# Patient Record
Sex: FEMALE | Race: ASIAN | NOT HISPANIC OR LATINO | Employment: OTHER | URBAN - METROPOLITAN AREA
[De-identification: names, ages, dates, MRNs, and addresses within clinical notes are randomized per-mention and may not be internally consistent; named-entity substitution may affect disease eponyms.]

---

## 2017-01-16 ENCOUNTER — TELEPHONE (OUTPATIENT)
Dept: MEDICAL GROUP | Age: 72
End: 2017-01-16

## 2017-01-16 NOTE — TELEPHONE ENCOUNTER
ANNUAL WELLNESS VISIT PRE-VISIT PLANNING     1.  Reviewed last PCP office visit assessment and plan notes: Yes    2.  If any orders were placed last visit do we have Results/Consult Notes?        •  Labs? Yes order for 6/28/17       •  Imaging? Yes complete       •  Referrals? Yes complete    3.  Patient Care Coordination Note was updated with diagnosis information:  Yes    4.  Patient is due for these Health Maintenance Topics:   Health Maintenance Due   Topic Date Due   • PAP SMEAR  01/17/1967   • IMM ZOSTER VACCINE  01/17/2006              5.  Immunizations were updated in [facility] using WebIZ?: Yes       •  Web Iz Recommendations:  Hep A, Hep B, Shingles       •  Is patient due for Tdap/Shingles? Yes.  If yes, was patient alerted of copay? Yes    6.  Patient has:       •   Diabetes: no       •   COPD: no       •   CHF: no       •   Depression: no    7.  Updated Care Team with Doutor Recomenda and all specialists?        •   Gait devices, O2, CPAP, etc: no        •   Eye professional: yes       •   Other specialists (GYN, cardiology, endo, etc): yes    8.  Is patient in need of any refills prior to office visit? No       •    Separate refill encounter created?: N\A    9.  Patient was informed to arrive 15 min prior to their scheduled appointment and bring in their medication bottles? yes    10.  Patient was advised: “This is a free wellness visit. The provider will screen for medical conditions to help you stay healthy. If you have other concerns to address you may be asked to discuss these at a separate visit or there may be an additional fee.”  Yes

## 2017-01-23 ENCOUNTER — OFFICE VISIT (OUTPATIENT)
Dept: MEDICAL GROUP | Age: 72
End: 2017-01-23
Payer: MEDICARE

## 2017-01-23 VITALS
DIASTOLIC BLOOD PRESSURE: 78 MMHG | WEIGHT: 129 LBS | HEIGHT: 65 IN | OXYGEN SATURATION: 95 % | SYSTOLIC BLOOD PRESSURE: 106 MMHG | HEART RATE: 66 BPM | TEMPERATURE: 97.2 F | BODY MASS INDEX: 21.49 KG/M2

## 2017-01-23 DIAGNOSIS — R73.01 IMPAIRED FASTING GLUCOSE: ICD-10-CM

## 2017-01-23 DIAGNOSIS — Z00.00 MEDICARE ANNUAL WELLNESS VISIT, SUBSEQUENT: ICD-10-CM

## 2017-01-23 DIAGNOSIS — M81.0 OSTEOPOROSIS: ICD-10-CM

## 2017-01-23 DIAGNOSIS — E55.9 VITAMIN D DEFICIENCY DISEASE: ICD-10-CM

## 2017-01-23 DIAGNOSIS — E78.2 MIXED HYPERLIPIDEMIA: ICD-10-CM

## 2017-01-23 DIAGNOSIS — K21.9 GASTROESOPHAGEAL REFLUX DISEASE, ESOPHAGITIS PRESENCE NOT SPECIFIED: ICD-10-CM

## 2017-01-23 DIAGNOSIS — Z12.31 SCREENING MAMMOGRAM, ENCOUNTER FOR: ICD-10-CM

## 2017-01-23 PROCEDURE — G0439 PPPS, SUBSEQ VISIT: HCPCS | Performed by: INTERNAL MEDICINE

## 2017-01-23 ASSESSMENT — PATIENT HEALTH QUESTIONNAIRE - PHQ9: CLINICAL INTERPRETATION OF PHQ2 SCORE: 0

## 2017-01-23 NOTE — PROGRESS NOTES
Chief Complaint   Patient presents with   • Annual Wellness Visit         HPI:  Rachele is a 72 y.o. female here for Medicare Annual Wellness Visit         Patient Active Problem List    Diagnosis Date Noted   • Osteoporosis- dexa 2016; fosamax started 12/28/2016   • GERD (gastroesophageal reflux disease) 11/05/2014   • Vitamin D deficiency disease 10/21/2013   • Impaired fasting glucose 09/28/2012   • HLD (hyperlipidemia) 06/14/2012       Current Outpatient Prescriptions   Medication Sig Dispense Refill   • alendronate (FOSAMAX) 70 MG Tab Take 1 Tab by mouth every 7 days. 12 Tab 4   • fenofibrate micronized (LOFIBRA) 134 MG capsule Take 1 Cap by mouth every morning before breakfast. 90 Cap 4   • Cholecalciferol (VITAMIN D3) 5000 UNITS Cap Take 1 Cap by mouth every day. 90 Cap 4   • ranitidine (ZANTAC) 150 MG Tab Take 1 Tab by mouth 2 times a day. 60 Tab 11   • naphazoline-pheniramine (OPCON-A) 0.027-0.315 % SOLN Place 1 Drop in both eyes 3 times a day as needed.       • fluticasone (FLONASE) 50 MCG/ACT nasal spray Spray 1 Spray in nose every day. (Patient not taking: Reported on 1/23/2017) 1 Bottle 0     No current facility-administered medications for this visit.        The patient reports adherence to this regimen    Current supplements as per medication list.   Chronic narcotic pain medicines: no    Allergies: Review of patient's allergies indicates no known allergies.    Current social contact/activities: works [employer] hard to do any social activities      Is patient current with immunizations?  no     If no, due for Shingles      She  reports that she has never smoked. She has never used smokeless tobacco. She reports that she does not drink alcohol or use illicit drugs.  Counseling given: Yes        DPA/Advanced Directive:  Patient does not have an advanced directive.  If not on file, instructed to bring in a copy to scan into her chart. If no advanced directive exists, a packet and workshop information was  provided    ROS:    Gait: Uses no assistive device    Ostomy: no    Other tubes: no     Amputations: no    Chronic oxygen use no    Last eye exam 6/2015    : Denies incontinence.      Depression Screening    Little interest or pleasure in doing things?  0 - not at all  Feeling down, depressed, or hopeless?  0 - not at all  Patient Health Questionnaire Score: 0    If depressive symptoms identified deferred to follow up visit unless specifically addressed in assessment and plan.    Screening for Cognitive Impairment    Three Minute Recall (banana, sunrise, fence)  3/3 Apple, river, kaiser  Draw clock face with all 12 numbers set to the hand to show 10 minutes past 11 o'clock  1 5/5  Cognitive concerns identified deferred for follow up unless specifically addressed in assessment and plan.    Fall Risk Assessment    Has the patient had two or more falls in the last year or any fall with injury in the last year?  No    Safety Assessment    Throw rugs on floor.  Yes  Handrails on all stairs.  Yes  Good lighting in all hallways.  Yes  Difficulty hearing.  Yes  Patient counseled about all safety risks that were identified.    Functional Assessment ADLs    Are there any barriers preventing you from cooking for yourself or meeting nutritional needs?  No.    Are there any barriers preventing you from driving safely or obtaining transportation?  No.    Are there any barriers preventing you from using a telephone or calling for help?  No.    Are there any barriers preventing you from shopping?  No.    Are there any barriers preventing you from taking care of your own finances?  No.    Are there any barriers preventing you from managing your medications?  No.    Are currently engaging any exercise or physical activity?  Yes.  Exercise 4 days a week.    Health Maintenance Summary                PAP SMEAR Overdue 1/17/1967     IMM ZOSTER VACCINE Overdue 1/17/2006     Annual Wellness Visit Overdue 1/22/2017      Done 1/22/2016  "Visit Dx: Medicare annual wellness visit, subsequent     Patient has more history with this topic...    MAMMOGRAM Next Due 2/8/2017      Done 2/8/2016 MA-SCREEN MAMMO W/CAD-BILAT     Patient has more history with this topic...    BONE DENSITY Next Due 2/8/2021      Done 2/8/2016 DS-BONE DENSITY STUDY (DEXA)    IMM DTaP/Tdap/Td Vaccine Next Due 6/20/2023      Done 6/20/2013 Imm Admin: Tdap Vaccine    COLONOSCOPY Next Due 3/10/2026      Done 3/10/2016 AMB REFERRAL TO GI FOR COLONOSCOPY     Patient has more history with this topic...          Patient Care Team:  Doug Carpenter M.D. as PCP - General (Internal Medicine)  Doug Hu M.D. as Consulting Physician (Ophthalmology)  [facility] as Consulting Physician (Gastroenterology)    Social History   Substance Use Topics   • Smoking status: Never Smoker    • Smokeless tobacco: Never Used   • Alcohol Use: No     Family History   Problem Relation Age of Onset   • Other Father      kidney failure   • Genetic Paternal Grandfather      liver problems     She  has a past medical history of Allergy.   Past Surgical History   Procedure Laterality Date   • Tonsillectomy  1952           Exam:     Blood pressure 106/78, pulse 66, temperature 36.2 °C (97.2 °F), height 1.648 m (5' 4.9\"), weight 58.514 kg (129 lb), SpO2 95 %. Body mass index is 21.54 kg/(m^2).    Hearing excellent.    Dentition good  Alert, oriented in no acute distress.  Eye contact is good, speech goal directed, affect calm       Assessment and Plan. The following treatment and monitoring plan is recommended:          1. Osteoporosis- dexa 2016; fosamax started  Under good control. Continue same regimen.      - Annual Wellness Visit - Includes PPPS Subsequent ()    2. Gastroesophageal reflux disease, esophagitis presence not specifiedUnder good control. Continue same regimen.      - Annual Wellness Visit - Includes PPPS Subsequent ()    3. Vitamin D deficiency diseaseUnder good " control. Continue same regimen.      - Annual Wellness Visit - Includes PPPS Subsequent ()    4. Impaired fasting glucoseUnder good control. Continue same regimen.     - Annual Wellness Visit - Includes PPPS Subsequent ()    5. Mixed hyperlipidemia   Under good control. Continue same regimen.  - Annual Wellness Visit - Includes PPPS Subsequent ()  - COMP METABOLIC PANEL; Future  - CBC WITH DIFFERENTIAL; Future  - LIPID PROFILE; Future     Services needed: No services needed at this time  Health Care Screening recommendations as per orders if indicated.  Referrals offered: PT/OT/Nutrition counseling/Behavioral Health/Smoking cessation as per orders if indicated.    Discussion today about general wellness and lifestyle habits:    · Prevent falls and reduce trip hazards; Cautioned about securing or removing rugs.  · Have a working fire alarm and carbon monoxide detector;   · Engage in regular physical activity and social activities      Follow-up: No Follow-up on file.

## 2017-01-23 NOTE — MR AVS SNAPSHOT
"Rachele Madrigal   2017 11:00 AM   Office Visit   MRN: [id number]    Department:  [address] [place] [address]   Dept Phone:  [phone]    Description:  Female : 1945   Provider:  Doug Carpenter M.D.; [facility]            Reason for Visit     Annual Wellness Visit           Allergies as of 2017     No Known Allergies      You were diagnosed with     Medicare annual wellness visit, subsequent   [155936]       Osteoporosis   [3528207]       Gastroesophageal reflux disease, esophagitis presence not specified   [8983322]       Vitamin D deficiency disease   [442915]       Impaired fasting glucose   [790.21.ICD-9-CM]       Mixed hyperlipidemia   [272.2.ICD-9-CM]       Screening mammogram, encounter for   [7664973]         Vital Signs     Blood Pressure Pulse Temperature Height Weight Body Mass Index    106/78 mmHg 66 36.2 °C (97.2 °F) 1.648 m (5' 4.9\") 58.514 kg (129 lb) 21.54 kg/m2    Oxygen Saturation Smoking Status                95% Never Smoker           Basic Information     Date Of Birth Sex Race Ethnicity Preferred Language    1945 Female  Non- English      Your appointments     2017  9:00 AM   Established Patient with Doug Carpenter M.D.   [address] [id number] [phone]           You will be receiving a confirmation call a few days before your appointment from our automated call confirmation system.              Problem List              ICD-10-CM Priority Class Noted - Resolved    HLD (hyperlipidemia) E78.5   2012 - Present    Impaired fasting glucose R73.01   2012 - Present    Vitamin D deficiency disease E55.9   10/21/2013 - Present    GERD (gastroesophageal reflux disease) K21.9   2014 - Present    Osteoporosis- dexa ; fosamax started M81.0   2016 - Present      Health Maintenance        Date Due Completion Dates    PAP SMEAR 1967 ---    IMM ZOSTER VACCINE " 1/17/2006 ---    MAMMOGRAM 2/8/2017 2/8/2016, 6/2/2014, 6/2/2014, 6/2/2014, 6/2/2014, 1/15/2013, 6/28/2012, 6/26/2012    BONE DENSITY 2/8/2021 2/8/2016    IMM DTaP/Tdap/Td Vaccine (2 - Td) 6/20/2023 6/20/2013    COLONOSCOPY 3/10/2026 3/10/2016, 2/2/2006            Current Immunizations     13-VALENT PCV PREVNAR 9/28/2012    Influenza Vaccine Adult HD 10/4/2016 10:18 AM, 10/6/2015 10:03 AM, 9/27/2014    Pneumococcal polysaccharide vaccine (PPSV-23) 1/22/2016    Tdap Vaccine 6/20/2013      Below and/or attached are the medications your provider expects you to take. Review all of your home medications and newly ordered medications with your provider and/or pharmacist. Follow medication instructions as directed by your provider and/or pharmacist. Please keep your medication list with you and share with your provider. Update the information when medications are discontinued, doses are changed, or new medications (including over-the-counter products) are added; and carry medication information at all times in the event of emergency situations     Allergies:  No Known Allergies          Medications  Valid as of: January 23, 2017 -  2:15 PM    Generic Name Brand Name Tablet Size Instructions for use    Alendronate Sodium (Tab) FOSAMAX 70 MG Take 1 Tab by mouth every 7 days.        Cholecalciferol (Cap) vitamin D3 5000 UNITS Take 1 Cap by mouth every day.        Fenofibrate Micronized (Cap) LOFIBRA 134 MG Take 1 Cap by mouth every morning before breakfast.        Fluticasone Propionate (Suspension) FLONASE 50 MCG/ACT Spray 1 Spray in nose every day.        Naphazoline-Pheniramine (Solution) OPCON-A 0.027-0.315 % Place 1 Drop in both eyes 3 times a day as needed.          RaNITidine HCl (Tab) ZANTAC 150 MG Take 1 Tab by mouth 2 times a day.        .                 Medicines prescribed today were sent to:     [facility] [address] [place] [address], [place] - [address]    [address] [place] [address]    Phone: [phone] Fax:  [phone]    Open 24 Hours?: No      Medication refill instructions:       If your prescription bottle indicates you have medication refills left, it is not necessary to call your provider’s office. Please contact your pharmacy and they will refill your medication.    If your prescription bottle indicates you do not have any refills left, you may request refills at any time through one of the following ways: The online [employer] system (except Urgent Care), by calling your provider’s office, or by asking your pharmacy to contact your provider’s office with a refill request. Medication refills are processed only during regular business hours and may not be available until the next business day. Your provider may request additional information or to have a follow-up visit with you prior to refilling your medication.   *Please Note: Medication refills are assigned a new Rx number when refilled electronically. Your pharmacy may indicate that no refills were authorized even though a new prescription for the same medication is available at the pharmacy. Please request the medicine by name with the pharmacy before contacting your provider for a refill.        Your To Do List     Future Labs/Procedures Complete By Expires    MA-SCREEN MAMMO W/CAD-BILAT  2/9/2017 (Approximate) 1/23/2018    CBC WITH DIFFERENTIAL  As directed 1/23/2018    COMP METABOLIC PANEL  As directed 1/23/2018    LIPID PROFILE  As directed 1/23/2018      Other Notes About Your Plan     This appointment is 1-22-16. It is the beginning of the year and will require all chronic conditions to be assessed and documented in conjunction with any other identified concerns during the visit:    Query: In 2012 Dr. Carpenter dx'd this patient with Neutropenia, WBC on 12-17-15 (4.7) in your medical opinion is this patient still Neutropenic D70.9                      [employer] Access Code: Activation code not generated  Current [employer] Status: Active

## 2017-03-30 ENCOUNTER — HOSPITAL ENCOUNTER (OUTPATIENT)
Dept: RADIOLOGY | Facility: MEDICAL CENTER | Age: 72
End: 2017-03-30
Attending: INTERNAL MEDICINE
Payer: MEDICARE

## 2017-03-30 DIAGNOSIS — Z12.31 SCREENING MAMMOGRAM, ENCOUNTER FOR: ICD-10-CM

## 2017-03-30 PROCEDURE — 77063 BREAST TOMOSYNTHESIS BI: CPT

## 2017-06-23 ENCOUNTER — HOSPITAL ENCOUNTER (OUTPATIENT)
Dept: LAB | Facility: MEDICAL CENTER | Age: 72
End: 2017-06-23
Attending: INTERNAL MEDICINE
Payer: MEDICARE

## 2017-06-23 DIAGNOSIS — M81.0 OSTEOPOROSIS: ICD-10-CM

## 2017-06-23 DIAGNOSIS — E78.2 MIXED HYPERLIPIDEMIA: ICD-10-CM

## 2017-06-23 DIAGNOSIS — E55.9 VITAMIN D DEFICIENCY DISEASE: ICD-10-CM

## 2017-06-23 LAB
25(OH)D3 SERPL-MCNC: 42 NG/ML (ref 30–100)
ALBUMIN SERPL BCP-MCNC: 4.3 G/DL (ref 3.2–4.9)
ALBUMIN/GLOB SERPL: 1.5 G/DL
ALP SERPL-CCNC: 39 U/L (ref 30–99)
ALT SERPL-CCNC: 22 U/L (ref 2–50)
ANION GAP SERPL CALC-SCNC: 11 MMOL/L (ref 0–11.9)
AST SERPL-CCNC: 21 U/L (ref 12–45)
BASOPHILS # BLD AUTO: 0.8 % (ref 0–1.8)
BASOPHILS # BLD: 0.03 K/UL (ref 0–0.12)
BILIRUB SERPL-MCNC: 0.8 MG/DL (ref 0.1–1.5)
BUN SERPL-MCNC: 26 MG/DL (ref 8–22)
CALCIUM SERPL-MCNC: 9.3 MG/DL (ref 8.5–10.5)
CHLORIDE SERPL-SCNC: 109 MMOL/L (ref 96–112)
CHOLEST SERPL-MCNC: 154 MG/DL (ref 100–199)
CO2 SERPL-SCNC: 23 MMOL/L (ref 20–33)
CREAT SERPL-MCNC: 1.02 MG/DL (ref 0.5–1.4)
EOSINOPHIL # BLD AUTO: 0.08 K/UL (ref 0–0.51)
EOSINOPHIL NFR BLD: 2 % (ref 0–6.9)
ERYTHROCYTE [DISTWIDTH] IN BLOOD BY AUTOMATED COUNT: 41 FL (ref 35.9–50)
GFR SERPL CREATININE-BSD FRML MDRD: 53 ML/MIN/1.73 M 2
GLOBULIN SER CALC-MCNC: 2.8 G/DL (ref 1.9–3.5)
GLUCOSE SERPL-MCNC: 115 MG/DL (ref 65–99)
HCT VFR BLD AUTO: 39.6 % (ref 37–47)
HDLC SERPL-MCNC: 49 MG/DL
HGB BLD-MCNC: 12.9 G/DL (ref 12–16)
IMM GRANULOCYTES # BLD AUTO: 0 K/UL (ref 0–0.11)
IMM GRANULOCYTES NFR BLD AUTO: 0 % (ref 0–0.9)
LDLC SERPL CALC-MCNC: 78 MG/DL
LYMPHOCYTES # BLD AUTO: 1.57 K/UL (ref 1–4.8)
LYMPHOCYTES NFR BLD: 39.6 % (ref 22–41)
MCH RBC QN AUTO: 31.2 PG (ref 27–33)
MCHC RBC AUTO-ENTMCNC: 32.6 G/DL (ref 33.6–35)
MCV RBC AUTO: 95.7 FL (ref 81.4–97.8)
MONOCYTES # BLD AUTO: 0.29 K/UL (ref 0–0.85)
MONOCYTES NFR BLD AUTO: 7.3 % (ref 0–13.4)
NEUTROPHILS # BLD AUTO: 1.99 K/UL (ref 2–7.15)
NEUTROPHILS NFR BLD: 50.3 % (ref 44–72)
NRBC # BLD AUTO: 0 K/UL
NRBC BLD AUTO-RTO: 0 /100 WBC
PLATELET # BLD AUTO: 246 K/UL (ref 164–446)
PMV BLD AUTO: 11 FL (ref 9–12.9)
POTASSIUM SERPL-SCNC: 4.2 MMOL/L (ref 3.6–5.5)
PROT SERPL-MCNC: 7.1 G/DL (ref 6–8.2)
RBC # BLD AUTO: 4.14 M/UL (ref 4.2–5.4)
SODIUM SERPL-SCNC: 143 MMOL/L (ref 135–145)
TRIGL SERPL-MCNC: 133 MG/DL (ref 0–149)
WBC # BLD AUTO: 4 K/UL (ref 4.8–10.8)

## 2017-06-23 PROCEDURE — 36415 COLL VENOUS BLD VENIPUNCTURE: CPT

## 2017-06-23 PROCEDURE — 82306 VITAMIN D 25 HYDROXY: CPT

## 2017-06-23 PROCEDURE — 80061 LIPID PANEL: CPT

## 2017-06-23 PROCEDURE — 85025 COMPLETE CBC W/AUTO DIFF WBC: CPT

## 2017-06-23 PROCEDURE — 80053 COMPREHEN METABOLIC PANEL: CPT

## 2017-06-27 ENCOUNTER — TELEPHONE (OUTPATIENT)
Dept: MEDICAL GROUP | Age: 72
End: 2017-06-27

## 2017-06-27 NOTE — TELEPHONE ENCOUNTER
ESTABLISHED PATIENT PRE-VISIT PLANNING     Note: Patient will not be contacted if there is no indication to call.     1.  Reviewed notes from the last few office visits within the medical group: Yes    2.  If any orders were placed at last visit or intended to be done for this visit (i.e. 6 mos follow-up), do we have Results/Consult Notes?        •  Labs - Labs ordered, completed and results are in chart.       •  Imaging - Imaging was not ordered at last office visit.       •  Referrals - No referrals were ordered at last office visit.    3. Is this appointment scheduled as a Hospital Follow-Up? No    4.  Immunizations were updated in [facility] using WebIZ?: Yes       •  Web Iz Recommendations: HEPATITIS A  PREVNAR (PCV13)  TDAP ZOSTAVAX (Shingles)    5.  Patient is due for the following Health Maintenance Topics:   Health Maintenance Due   Topic Date Due   • PAP SMEAR  01/17/1967   • IMM ZOSTER VACCINE  01/17/2006       - Patient has completed FLU and PNEUMOVAX (PPSV23) Immunization(s) per WebIZ. Chart has been updated.    6.  Patient was NOT informed to arrive 15 min prior to their scheduled appointment and bring in their medication bottles.   Moderate Conscious Sedation, Adult, Care After  Refer to this sheet in the next few weeks. These instructions provide you with information on caring for yourself after your procedure. Your health care provider may also give you more specific instructions. Your treatment has been planned according to current medical practices, but problems sometimes occur. Call your health care provider if you have any problems or questions after your procedure.  WHAT TO EXPECT AFTER THE PROCEDURE    After your procedure:  · You may feel sleepy, clumsy, and have poor balance for several hours.  · Vomiting may occur if you eat too soon after the procedure.  HOME CARE INSTRUCTIONS  · Do not participate in any activities where you could become injured for at least 24 hours. Do not:  ¨ Drive.  ¨ Swim.  ¨ Ride a bicycle.  ¨ Operate heavy machinery.  ¨ Cook.  ¨ Use power tools.  ¨ Climb ladders.  ¨ Work from a high place.  · Do not make important decisions or sign legal documents until you are improved.  · If you vomit, drink water, juice, or soup when you can drink without vomiting. Make sure you have little or no nausea before eating solid foods.  · Only take over-the-counter or prescription medicines for pain, discomfort, or fever as directed by your health care provider.  · Make sure you and your family fully understand everything about the medicines given to you, including what side effects may occur.  · You should not drink alcohol, take sleeping pills, or take medicines that cause drowsiness for at least 24 hours.  · If you smoke, do not smoke without supervision.  · If you are feeling better, you may resume normal activities 24 hours after you were sedated.  · Keep all appointments with your health care provider.  SEEK MEDICAL CARE IF:  · Your skin is pale or bluish in color.  · You continue to feel nauseous or vomit.  · Your pain is getting worse and is not helped by medicine.  · You have bleeding or swelling.  · You are still  sleepy or feeling clumsy after 24 hours.  SEEK IMMEDIATE MEDICAL CARE IF:  · You develop a rash.  · You have difficulty breathing.  · You develop any type of allergic problem.  · You have a fever.  MAKE SURE YOU:  · Understand these instructions.  · Will watch your condition.  · Will get help right away if you are not doing well or get worse.     This information is not intended to replace advice given to you by your health care provider. Make sure you discuss any questions you have with your health care provider.     Document Released: 10/08/2014 Document Revised: 01/08/2016 Document Reviewed: 10/08/2014  [employer] Interactive Patient Education ©2016 [employer]         CALL YOUR PHYSICIAN IF YOU EXPERIENCE  INCREASED PAIN NOT HELPED BY YOUR PAIN MEDICATION.    IF YOU HAVE QUESTIONS OR CONCERNS AFTER YOU ARE DISCHARGED CALL THE NURSE AT THE [facility] LINE [phone].              Fall Prevention in the Home      Falls can cause injuries. They can happen to people of all ages. There are many things you can do to make your home safe and to help prevent falls.    WHAT CAN I DO ON THE OUTSIDE OF MY HOME?  · Regularly fix the edges of walkways and driveways and fix any cracks.  · Remove anything that might make you trip as you walk through a door, such as a raised step or threshold.  · Trim any bushes or trees on the path to your home.  · Use bright outdoor lighting.  · Clear any walking paths of anything that might make someone trip, such as rocks or tools.  · Regularly check to see if handrails are loose or broken. Make sure that both sides of any steps have handrails.  · Any raised decks and porches should have guardrails on the edges.  · Have any leaves, snow, or ice cleared regularly.  · Use sand or salt on walking paths during winter.  · Clean up any spills in your garage right away. This includes oil or grease spills.  WHAT CAN I DO IN THE BATHROOM?    · Use night lights.  · Install grab bars by the  toilet and in the tub and shower. Do not use towel bars as grab bars.  · Use non-skid mats or decals in the tub or shower.  · If you need to sit down in the shower, use a plastic, non-slip stool.  · Keep the floor dry. Clean up any water that spills on the floor as soon as it happens.  · Remove soap buildup in the tub or shower regularly.  · Attach bath mats securely with double-sided non-slip rug tape.  · Do not have throw rugs and other things on the floor that can make you trip.  WHAT CAN I DO IN THE BEDROOM?  · Use night lights.  · Make sure that you have a light by your bed that is easy to reach.  · Do not use any sheets or blankets that are too big for your bed. They should not hang down onto the floor.  · Have a firm chair that has side arms. You can use this for support while you get dressed.  · Do not have throw rugs and other things on the floor that can make you trip.  WHAT CAN I DO IN THE KITCHEN?  · Clean up any spills right away.  · Avoid walking on wet floors.  · Keep items that you use a lot in easy-to-reach places.  · If you need to reach something above you, use a strong step stool that has a grab bar.  · Keep electrical cords out of the way.  · Do not use floor polish or wax that makes floors slippery. If you must use wax, use non-skid floor wax.  · Do not have throw rugs and other things on the floor that can make you trip.  WHAT CAN I DO WITH MY STAIRS?  · Do not leave any items on the stairs.  · Make sure that there are handrails on both sides of the stairs and use them. Fix handrails that are broken or loose. Make sure that handrails are as long as the stairways.  · Check any carpeting to make sure that it is firmly attached to the stairs. Fix any carpet that is loose or worn.  · Avoid having throw rugs at the top or bottom of the stairs. If you do have throw rugs, attach them to the floor with carpet tape.  · Make sure that you have a light switch at the top of the stairs and the bottom of  the stairs. If you do not have them, ask someone to add them for you.  WHAT ELSE CAN I DO TO HELP PREVENT FALLS?  · Wear shoes that:  ¨ Do not have high heels.  ¨ Have rubber bottoms.  ¨ Are comfortable and fit you well.  ¨ Are closed at the toe. Do not wear sandals.  · If you use a stepladder:  ¨ Make sure that it is fully opened. Do not climb a closed stepladder.  ¨ Make sure that both sides of the stepladder are locked into place.  ¨ Ask someone to hold it for you, if possible.  · Clearly shemar and make sure that you can see:  ¨ Any grab bars or handrails.  ¨ First and last steps.  ¨ Where the edge of each step is.  · Use tools that help you move around (mobility aids) if they are needed. These include:  ¨ Canes.  ¨ Walkers.  ¨ Scooters.  ¨ Crutches.  · Turn on the lights when you go into a dark area. Replace any light bulbs as soon as they burn out.  · Set up your furniture so you have a clear path. Avoid moving your furniture around.  · If any of your floors are uneven, fix them.  · If there are any pets around you, be aware of where they are.  · Review your medicines with your doctor. Some medicines can make you feel dizzy. This can increase your chance of falling.  Ask your doctor what other things that you can do to help prevent falls.     This information is not intended to replace advice given to you by your health care provider. Make sure you discuss any questions you have with your health care provider.     Document Released: 10/14/2010 Document Revised: 05/03/2016 Document Reviewed: 01/22/2016  [employer]vier Interactive Patient Education ©2016 [employer]     PATIENT/FAMILY/RESPONSIBLE PARTY VERBALIZES UNDERSTANDING OF ABOVE EDUCATION

## 2017-06-28 ENCOUNTER — OFFICE VISIT (OUTPATIENT)
Dept: MEDICAL GROUP | Age: 72
End: 2017-06-28
Payer: MEDICARE

## 2017-06-28 VITALS
HEART RATE: 101 BPM | TEMPERATURE: 97.3 F | OXYGEN SATURATION: 97 % | SYSTOLIC BLOOD PRESSURE: 108 MMHG | DIASTOLIC BLOOD PRESSURE: 54 MMHG | WEIGHT: 130 LBS | BODY MASS INDEX: 21.66 KG/M2 | HEIGHT: 65 IN

## 2017-06-28 DIAGNOSIS — K21.00 GASTROESOPHAGEAL REFLUX DISEASE WITH ESOPHAGITIS: ICD-10-CM

## 2017-06-28 DIAGNOSIS — M81.0 AGE-RELATED OSTEOPOROSIS WITHOUT CURRENT PATHOLOGICAL FRACTURE: ICD-10-CM

## 2017-06-28 DIAGNOSIS — E78.2 MIXED HYPERLIPIDEMIA: ICD-10-CM

## 2017-06-28 DIAGNOSIS — R73.01 IMPAIRED FASTING GLUCOSE: ICD-10-CM

## 2017-06-28 DIAGNOSIS — E55.9 VITAMIN D DEFICIENCY DISEASE: ICD-10-CM

## 2017-06-28 PROCEDURE — 99214 OFFICE O/P EST MOD 30 MIN: CPT | Performed by: INTERNAL MEDICINE

## 2017-06-28 RX ORDER — ALENDRONATE SODIUM 70 MG/1
70 TABLET ORAL
Qty: 13 TAB | Refills: 4 | Status: SHIPPED | OUTPATIENT
Start: 2017-06-28 | End: 2018-01-03 | Stop reason: SDUPTHER

## 2017-06-28 RX ORDER — ALENDRONATE SODIUM 70 MG/1
70 TABLET ORAL
Qty: 13 TAB | Refills: 4 | Status: SHIPPED | OUTPATIENT
Start: 2017-06-28 | End: 2017-06-28 | Stop reason: SDUPTHER

## 2017-06-28 ASSESSMENT — ENCOUNTER SYMPTOMS
MUSCULOSKELETAL NEGATIVE: 1
PSYCHIATRIC NEGATIVE: 1
CARDIOVASCULAR NEGATIVE: 1
GASTROINTESTINAL NEGATIVE: 1
CONSTITUTIONAL NEGATIVE: 1
RESPIRATORY NEGATIVE: 1
NEUROLOGICAL NEGATIVE: 1
EYES NEGATIVE: 1

## 2017-06-28 NOTE — MR AVS SNAPSHOT
"Rachele Madrigal   2017 9:00 AM   Office Visit   MRN: [id number]    Department:  [address] [place] [address]   Dept Phone:  [phone]    Description:  Female : 1945   Provider:  Doug Carpenter M.D.           Reason for Visit     Hyperlipidemia F/V      Allergies as of 2017     No Known Allergies      You were diagnosed with     Mixed hyperlipidemia   [272.2.ICD-9-CM]       Impaired fasting glucose   [790.21.ICD-9-CM]       Vitamin D deficiency disease   [012545]       Gastroesophageal reflux disease with esophagitis   [8455501]       Age-related osteoporosis without current pathological fracture   [733211]         Vital Signs     Blood Pressure Pulse Temperature Height Weight Body Mass Index    108/54 mmHg 101 36.3 °C (97.3 °F) 1.651 m (5' 5\") 58.968 kg (130 lb) 21.63 kg/m2    Oxygen Saturation Smoking Status                97% Never Smoker           Basic Information     Date Of Birth Sex Race Ethnicity Preferred Language    1945 Female  Non- English      Your appointments     Dec 29, 2017  9:00 AM   Established Patient with Doug Carpenter M.D.   [address] [id number] [phone]           You will be receiving a confirmation call a few days before your appointment from our automated call confirmation system.              Problem List              ICD-10-CM Priority Class Noted - Resolved    Mixed hyperlipidemia E78.2   2012 - Present    Impaired fasting glucose R73.01   2012 - Present    Vitamin D deficiency disease E55.9   10/21/2013 - Present    Gastroesophageal reflux disease with esophagitis K21.0   2014 - Present    Age-related osteoporosis without current pathological fracture- dexa ; fosamax started M81.0   2016 - Present      Health Maintenance        Date Due Completion Dates    IMM ZOSTER VACCINE 2006 ---    MAMMOGRAM 3/30/2018 3/30/2017, 2016, 2014, 1/15/2013, " 6/28/2012, 6/26/2012    BONE DENSITY 2/8/2021 2/8/2016    IMM DTaP/Tdap/Td Vaccine (2 - Td) 6/20/2023 6/20/2013    COLONOSCOPY 3/10/2026 3/10/2016, 2/2/2006            Current Immunizations     13-VALENT PCV PREVNAR 9/28/2012    Influenza Vaccine Adult HD 10/4/2016 10:18 AM, 10/6/2015 10:03 AM, 9/27/2014    Pneumococcal polysaccharide vaccine (PPSV-23) 1/22/2016    Tdap Vaccine 6/20/2013      Below and/or attached are the medications your provider expects you to take. Review all of your home medications and newly ordered medications with your provider and/or pharmacist. Follow medication instructions as directed by your provider and/or pharmacist. Please keep your medication list with you and share with your provider. Update the information when medications are discontinued, doses are changed, or new medications (including over-the-counter products) are added; and carry medication information at all times in the event of emergency situations     Allergies:  No Known Allergies          Medications  Valid as of: June 28, 2017 -  9:20 AM    Generic Name Brand Name Tablet Size Instructions for use    Alendronate Sodium (Tab) FOSAMAX 70 MG Take 1 Tab by mouth every 7 days.        Cholecalciferol (Cap) vitamin D3 5000 UNITS Take 1 Cap by mouth every day.        Fenofibrate Micronized (Cap) LOFIBRA 134 MG Take 1 Cap by mouth every morning before breakfast.        Naphazoline-Pheniramine (Solution) OPCON-A 0.027-0.315 % Place 1 Drop in both eyes 3 times a day as needed.          .                 Medicines prescribed today were sent to:     [facility] [address] [place] [address] - [address] [facility] [address]    Phone: [phone] Fax: [phone]    Open 24 Hours?: No      Medication refill instructions:       If your prescription bottle indicates you have medication refills left, it is not necessary to call your provider’s office. Please contact your pharmacy and they will refill your medication.    If your  prescription bottle indicates you do not have any refills left, you may request refills at any time through one of the following ways: The online [employer] system (except Urgent Care), by calling your provider’s office, or by asking your pharmacy to contact your provider’s office with a refill request. Medication refills are processed only during regular business hours and may not be available until the next business day. Your provider may request additional information or to have a follow-up visit with you prior to refilling your medication.   *Please Note: Medication refills are assigned a new Rx number when refilled electronically. Your pharmacy may indicate that no refills were authorized even though a new prescription for the same medication is available at the pharmacy. Please request the medicine by name with the pharmacy before contacting your provider for a refill.        Your To Do List     Future Labs/Procedures Complete By Expires    CBC WITH DIFFERENTIAL  As directed 6/28/2018    COMP METABOLIC PANEL  As directed 6/28/2018    LIPID PROFILE  As directed 6/28/2018    TSH  As directed 6/28/2018    VITAMIN D,25 HYDROXY  As directed 6/28/2018      Other Notes About Your Plan     This appointment is 1-22-16. It is the beginning of the year and will require all chronic conditions to be assessed and documented in conjunction with any other identified concerns during the visit:    Query: In 2012 Dr. Carpenter dx'd this patient with Neutropenia, WBC on 12-17-15 (4.7) in your medical opinion is this patient still Neutropenic D70.9                      [employer] Access Code: Activation code not generated  Current [employer] Status: Active

## 2017-06-28 NOTE — Clinical Note
June 28, 2017        Rachele Madrigal  [address] [facility] [address]        Dear Rachele:     Current Outpatient Prescriptions   Medication Sig Dispense Refill   • alendronate (FOSAMAX) 70 MG Tab Take 1 Tab by mouth every 7 days. 13 Tab 4   • fenofibrate micronized (LOFIBRA) 134 MG capsule Take 1 Cap by mouth every morning before breakfast. 90 Cap 4   • Cholecalciferol (VITAMIN D3) 5000 UNITS Cap Take 1 Cap by mouth every day. 90 Cap 4      No current facility-administered medications for this visit.     If you have any questions or concerns, please don't hesitate to call.        Sincerely,        Doug Carpenter M.D.    Electronically Signed

## 2017-06-28 NOTE — PROGRESS NOTES
"Subjective:      Rachele Madrigal is a 72 y.o. female who presents with Hyperlipidemia  The patient is here for followup of chronic medical problems listed below. The patient is compliant with medications and having no side effects from them. Denies chest pain, abdominal pain, dyspnea, myalgias, or cough.   Patient Active Problem List    Diagnosis Date Noted   • Age-related osteoporosis without current pathological fracture- dexa 2016; fosamax started 12/28/2016   • Gastroesophageal reflux disease with esophagitis 11/05/2014   • Vitamin D deficiency disease 10/21/2013   • Impaired fasting glucose 09/28/2012   • Mixed hyperlipidemia 06/14/2012     No Known Allergies  Outpatient Prescriptions Prior to Visit   Medication Sig Dispense Refill   • fenofibrate micronized (LOFIBRA) 134 MG capsule Take 1 Cap by mouth every morning before breakfast. 90 Cap 4   • Cholecalciferol (VITAMIN D3) 5000 UNITS Cap Take 1 Cap by mouth every day. 90 Cap 4   • alendronate (FOSAMAX) 70 MG Tab Take 1 Tab by mouth every 7 days. 12 Tab 4   • fluticasone (FLONASE) 50 MCG/ACT nasal spray Spray 1 Spray in nose every day. (Patient not taking: Reported on 1/23/2017) 1 Bottle 0   • ranitidine (ZANTAC) 150 MG Tab Take 1 Tab by mouth 2 times a day. 60 Tab 11   • naphazoline-pheniramine (OPCON-A) 0.027-0.315 % SOLN Place 1 Drop in both eyes 3 times a day as needed.         No facility-administered medications prior to visit.               HPI    Review of Systems   Constitutional: Negative.    HENT: Negative.    Eyes: Negative.    Respiratory: Negative.    Cardiovascular: Negative.    Gastrointestinal: Negative.    Genitourinary: Negative.    Musculoskeletal: Negative.    Skin: Negative.    Neurological: Negative.    Endo/Heme/Allergies: Negative.    Psychiatric/Behavioral: Negative.           Objective:     /54 mmHg  Pulse 101  Temp(Src) 36.3 °C (97.3 °F)  Ht 1.651 m (5' 5\")  Wt 58.968 kg (130 lb)  BMI 21.63 kg/m2  SpO2 97%     Physical Exam "   Constitutional: She is oriented to person, place, and time. She appears well-developed and well-nourished.   HENT:   Head: Normocephalic and atraumatic.   Right Ear: External ear normal.   Left Ear: External ear normal.   Nose: Nose normal.   Mouth/Throat: Oropharynx is clear and moist.   Eyes: Conjunctivae and EOM are normal. Pupils are equal, round, and reactive to light. Right eye exhibits no discharge. Left eye exhibits no discharge. No scleral icterus.   Neck: Normal range of motion. Neck supple. No JVD present. No tracheal deviation present. No thyromegaly present.   Cardiovascular: Normal rate, regular rhythm, normal heart sounds and intact distal pulses.  Exam reveals no gallop and no friction rub.    Pulmonary/Chest: Effort normal and breath sounds normal. No stridor. No respiratory distress. She has no wheezes. She has no rales. She exhibits no tenderness.   Abdominal: Soft. Bowel sounds are normal. She exhibits no distension and no mass. There is no tenderness. There is no rebound and no guarding. No hernia.   Musculoskeletal: Normal range of motion. She exhibits no edema or tenderness.   Lymphadenopathy:     She has no cervical adenopathy.   Neurological: She is alert and oriented to person, place, and time. She has normal reflexes. She displays normal reflexes. No cranial nerve deficit. Coordination normal.   Skin: Skin is warm and dry. No rash noted. No erythema. No pallor.   Psychiatric: She has a normal mood and affect. Her behavior is normal. Judgment and thought content normal.   Nursing note and vitals reviewed.    Hospital Outpatient Visit on 06/23/2017   Component Date Value   • Sodium 06/23/2017 143    • Potassium 06/23/2017 4.2    • Chloride 06/23/2017 109    • Co2 06/23/2017 23    • Anion Gap 06/23/2017 11.0    • Glucose 06/23/2017 115*   • Bun 06/23/2017 26*   • Creatinine 06/23/2017 1.02    • Calcium 06/23/2017 9.3    • AST(SGOT) 06/23/2017 21    • ALT(SGPT) 06/23/2017 22    • Alkaline  Phosphatase 06/23/2017 39    • Total Bilirubin 06/23/2017 0.8    • Albumin 06/23/2017 4.3    • Total Protein 06/23/2017 7.1    • Globulin 06/23/2017 2.8    • A-G Ratio 06/23/2017 1.5    • Cholesterol,Tot 06/23/2017 154    • Triglycerides 06/23/2017 133    • HDL 06/23/2017 49    • LDL 06/23/2017 78    • WBC 06/23/2017 4.0*   • RBC 06/23/2017 4.14*   • Hemoglobin 06/23/2017 12.9    • Hematocrit 06/23/2017 39.6    • MCV 06/23/2017 95.7    • MCH 06/23/2017 31.2    • MCHC 06/23/2017 32.6*   • RDW 06/23/2017 41.0    • Platelet Count 06/23/2017 246    • MPV 06/23/2017 11.0    • Neutrophils-Polys 06/23/2017 50.30    • Lymphocytes 06/23/2017 39.60    • Monocytes 06/23/2017 7.30    • Eosinophils 06/23/2017 2.00    • Basophils 06/23/2017 0.80    • Immature Granulocytes 06/23/2017 0.00    • Nucleated RBC 06/23/2017 0.00    • Neutrophils (Absolute) 06/23/2017 1.99*   • Lymphs (Absolute) 06/23/2017 1.57    • Monos (Absolute) 06/23/2017 0.29    • Eos (Absolute) 06/23/2017 0.08    • Baso (Absolute) 06/23/2017 0.03    • Immature Granulocytes (a* 06/23/2017 0.00    • NRBC (Absolute) 06/23/2017 0.00    • 25-Hydroxy   Vitamin D 25 06/23/2017 42    • GFR If  06/23/2017 >60    • GFR If Non  Ameri* 06/23/2017 53*      Lab Results   Component Value Date/Time    GLYCOHEMOGLOBIN 5.9 12/20/2012 09:11 AM     Lab Results   Component Value Date/Time    SODIUM 143 06/23/2017 07:06 AM    POTASSIUM 4.2 06/23/2017 07:06 AM    CHLORIDE 109 06/23/2017 07:06 AM    CO2 23 06/23/2017 07:06 AM    GLUCOSE 115* 06/23/2017 07:06 AM    BUN 26* 06/23/2017 07:06 AM    CREATININE 1.02 06/23/2017 07:06 AM    ALKALINE PHOSPHATASE 39 06/23/2017 07:06 AM    AST(SGOT) 21 06/23/2017 07:06 AM    ALT(SGPT) 22 06/23/2017 07:06 AM    TOTAL BILIRUBIN 0.8 06/23/2017 07:06 AM     No results found for: INR  Lab Results   Component Value Date/Time    CHOLESTEROL, 06/23/2017 07:06 AM    LDL 78 06/23/2017 07:06 AM    HDL 49 06/23/2017 07:06 AM     TRIGLYCERIDES 133 06/23/2017 07:06 AM       No results found for: TESTOSTERONE  No results found for: TSH  Lab Results   Component Value Date/Time    FREE T-4 0.93 10/14/2013 08:48 AM    FREE T-4 0.78 06/15/2012 07:11 AM     No results found for: URICACID  No components found for: VITB12  Lab Results   Component Value Date/Time    25-HYDROXY   VITAMIN D 25 42 06/23/2017 07:06 AM    25-HYDROXY   VITAMIN D 25 41 12/15/2016 08:28 AM                  Assessment/Plan:     1. Mixed hyperlipidemia    Under good control. Continue same regimen.    - TSH; Future  - COMP METABOLIC PANEL; Future  - LIPID PROFILE; Future  - CBC WITH DIFFERENTIAL; Future    2. Impaired fasting glucose    Diet/exercise/ ; patient counseled      3. Vitamin D deficiency disease   Under good control. Continue same regimen. 5k u qd  - VITAMIN D,25 HYDROXY; Future    4. Gastroesophageal reflux disease with esophagitis    Under good control. Continue same regimen.  h2b/ppi  prn            5. Age-related osteoporosis without current pathological fracture- dexa 2016; fosamax started-patient has not been taking this. I instructed her to resume and went over in detail the reason to take it. She now seems understanding.         - alendronate (FOSAMAX) 70 MG Tab; Take 1 Tab by mouth every 7 days.  Dispense: 13 Tab; Refill: 4      30 minute face-to-face encounter took place today.  More than half of this time was spent in the coordination of care of the above problems, as well as counseling.

## 2017-10-06 ENCOUNTER — NON-PROVIDER VISIT (OUTPATIENT)
Dept: MEDICAL GROUP | Facility: PHYSICIAN GROUP | Age: 72
End: 2017-10-06
Payer: MEDICARE

## 2017-10-06 DIAGNOSIS — Z23 NEED FOR INFLUENZA VACCINATION: ICD-10-CM

## 2017-10-06 PROCEDURE — 90662 IIV NO PRSV INCREASED AG IM: CPT | Performed by: FAMILY MEDICINE

## 2017-10-06 PROCEDURE — G0008 ADMIN INFLUENZA VIRUS VAC: HCPCS | Performed by: FAMILY MEDICINE

## 2017-12-20 ENCOUNTER — PATIENT OUTREACH (OUTPATIENT)
Dept: HEALTH INFORMATION MANAGEMENT | Facility: OTHER | Age: 72
End: 2017-12-20

## 2017-12-20 NOTE — PROGRESS NOTES
1. Attempt #:Final    2. [employer] Verified: yes    3. Verify PCP: yes    4. Care Team Updated:       •   DME Company (gait device, O2, CPAP, etc.): NO       •   Other Specialists (eye doctor, derm, GYN, cardiology, endo, etc): NO    5.  Reviewed/Updated the following with patient:       •   Communication Preference Obtained? YES       •   Preferred Pharmacy? YES       •   Preferred Lab? YES       •   Family History (document living status of immediate family members and if + hx of cancer, diabetes, hypertension, hyperlipidemia, heart attack, stroke) NO// pt needs to complete this info in the office.    6. [employer] Activation: already active    7. [employer] Gina: no    8. Annual Wellness Visit Scheduling  Scheduling Status:Scheduled      9. Care Gap Scheduling (Attempt to Schedule EACH Overdue Care Gap!)     Health Maintenance Due   Topic Date Due   • IMM ZOSTER VACCINE  01/17/2006        Scheduled patient for Annual Wellness Visit      10. Patient was advised: “This is a free wellness visit. The provider will screen for medical conditions to help you stay healthy. If you have other concerns to address you may be asked to discuss these at a separate visit or there may be an additional fee.”     11. Patient was informed to arrive 15 min prior to their scheduled appointment and bring in their medication bottles.

## 2017-12-22 ENCOUNTER — HOSPITAL ENCOUNTER (OUTPATIENT)
Dept: LAB | Facility: MEDICAL CENTER | Age: 72
End: 2017-12-22
Attending: INTERNAL MEDICINE
Payer: MEDICARE

## 2017-12-22 DIAGNOSIS — E55.9 VITAMIN D DEFICIENCY DISEASE: ICD-10-CM

## 2017-12-22 DIAGNOSIS — E78.2 MIXED HYPERLIPIDEMIA: ICD-10-CM

## 2017-12-22 LAB
25(OH)D3 SERPL-MCNC: 40 NG/ML (ref 30–100)
ALBUMIN SERPL BCP-MCNC: 4.4 G/DL (ref 3.2–4.9)
ALBUMIN/GLOB SERPL: 1.4 G/DL
ALP SERPL-CCNC: 40 U/L (ref 30–99)
ALT SERPL-CCNC: 47 U/L (ref 2–50)
ANION GAP SERPL CALC-SCNC: 10 MMOL/L (ref 0–11.9)
AST SERPL-CCNC: 36 U/L (ref 12–45)
BASOPHILS # BLD AUTO: 0.9 % (ref 0–1.8)
BASOPHILS # BLD: 0.04 K/UL (ref 0–0.12)
BILIRUB SERPL-MCNC: 1 MG/DL (ref 0.1–1.5)
BUN SERPL-MCNC: 22 MG/DL (ref 8–22)
CALCIUM SERPL-MCNC: 9.9 MG/DL (ref 8.5–10.5)
CHLORIDE SERPL-SCNC: 104 MMOL/L (ref 96–112)
CHOLEST SERPL-MCNC: 180 MG/DL (ref 100–199)
CO2 SERPL-SCNC: 28 MMOL/L (ref 20–33)
CREAT SERPL-MCNC: 1 MG/DL (ref 0.5–1.4)
EOSINOPHIL # BLD AUTO: 0.1 K/UL (ref 0–0.51)
EOSINOPHIL NFR BLD: 2.2 % (ref 0–6.9)
ERYTHROCYTE [DISTWIDTH] IN BLOOD BY AUTOMATED COUNT: 43.6 FL (ref 35.9–50)
GFR SERPL CREATININE-BSD FRML MDRD: 55 ML/MIN/1.73 M 2
GLOBULIN SER CALC-MCNC: 3.1 G/DL (ref 1.9–3.5)
GLUCOSE SERPL-MCNC: 102 MG/DL (ref 65–99)
HCT VFR BLD AUTO: 42 % (ref 37–47)
HDLC SERPL-MCNC: 51 MG/DL
HGB BLD-MCNC: 13.7 G/DL (ref 12–16)
IMM GRANULOCYTES # BLD AUTO: 0.01 K/UL (ref 0–0.11)
IMM GRANULOCYTES NFR BLD AUTO: 0.2 % (ref 0–0.9)
LDLC SERPL CALC-MCNC: 98 MG/DL
LYMPHOCYTES # BLD AUTO: 1.68 K/UL (ref 1–4.8)
LYMPHOCYTES NFR BLD: 37.2 % (ref 22–41)
MCH RBC QN AUTO: 32.2 PG (ref 27–33)
MCHC RBC AUTO-ENTMCNC: 32.6 G/DL (ref 33.6–35)
MCV RBC AUTO: 98.6 FL (ref 81.4–97.8)
MONOCYTES # BLD AUTO: 0.36 K/UL (ref 0–0.85)
MONOCYTES NFR BLD AUTO: 8 % (ref 0–13.4)
NEUTROPHILS # BLD AUTO: 2.33 K/UL (ref 2–7.15)
NEUTROPHILS NFR BLD: 51.5 % (ref 44–72)
NRBC # BLD AUTO: 0 K/UL
NRBC BLD-RTO: 0 /100 WBC
PLATELET # BLD AUTO: 255 K/UL (ref 164–446)
PMV BLD AUTO: 11.1 FL (ref 9–12.9)
POTASSIUM SERPL-SCNC: 3.9 MMOL/L (ref 3.6–5.5)
PROT SERPL-MCNC: 7.5 G/DL (ref 6–8.2)
RBC # BLD AUTO: 4.26 M/UL (ref 4.2–5.4)
SODIUM SERPL-SCNC: 142 MMOL/L (ref 135–145)
TRIGL SERPL-MCNC: 157 MG/DL (ref 0–149)
TSH SERPL DL<=0.005 MIU/L-ACNC: 2.02 UIU/ML (ref 0.38–5.33)
WBC # BLD AUTO: 4.5 K/UL (ref 4.8–10.8)

## 2017-12-22 PROCEDURE — 80053 COMPREHEN METABOLIC PANEL: CPT

## 2017-12-22 PROCEDURE — 85025 COMPLETE CBC W/AUTO DIFF WBC: CPT

## 2017-12-22 PROCEDURE — 82306 VITAMIN D 25 HYDROXY: CPT

## 2017-12-22 PROCEDURE — 36415 COLL VENOUS BLD VENIPUNCTURE: CPT

## 2017-12-22 PROCEDURE — 84443 ASSAY THYROID STIM HORMONE: CPT

## 2017-12-22 PROCEDURE — 80061 LIPID PANEL: CPT

## 2018-01-03 ENCOUNTER — OFFICE VISIT (OUTPATIENT)
Dept: MEDICAL GROUP | Age: 73
End: 2018-01-03
Payer: MEDICARE

## 2018-01-03 VITALS
OXYGEN SATURATION: 94 % | HEIGHT: 65 IN | WEIGHT: 130 LBS | DIASTOLIC BLOOD PRESSURE: 60 MMHG | BODY MASS INDEX: 21.66 KG/M2 | HEART RATE: 82 BPM | TEMPERATURE: 98.6 F | SYSTOLIC BLOOD PRESSURE: 112 MMHG

## 2018-01-03 DIAGNOSIS — M81.0 AGE-RELATED OSTEOPOROSIS WITHOUT CURRENT PATHOLOGICAL FRACTURE: ICD-10-CM

## 2018-01-03 DIAGNOSIS — E78.2 MIXED HYPERLIPIDEMIA: ICD-10-CM

## 2018-01-03 DIAGNOSIS — E55.9 VITAMIN D DEFICIENCY DISEASE: ICD-10-CM

## 2018-01-03 PROCEDURE — 99214 OFFICE O/P EST MOD 30 MIN: CPT | Performed by: INTERNAL MEDICINE

## 2018-01-03 RX ORDER — FENOFIBRATE 134 MG/1
134 CAPSULE ORAL
Qty: 90 CAP | Refills: 4 | Status: SHIPPED | OUTPATIENT
Start: 2018-01-03 | End: 2019-01-14 | Stop reason: SDUPTHER

## 2018-01-03 RX ORDER — ALENDRONATE SODIUM 70 MG/1
70 TABLET ORAL
Qty: 13 TAB | Refills: 4 | Status: SHIPPED | OUTPATIENT
Start: 2018-01-03 | End: 2019-01-14 | Stop reason: SDUPTHER

## 2018-01-03 ASSESSMENT — ENCOUNTER SYMPTOMS
MUSCULOSKELETAL NEGATIVE: 1
PSYCHIATRIC NEGATIVE: 1
CONSTITUTIONAL NEGATIVE: 1
GASTROINTESTINAL NEGATIVE: 1
EYES NEGATIVE: 1
CARDIOVASCULAR NEGATIVE: 1
NEUROLOGICAL NEGATIVE: 1
RESPIRATORY NEGATIVE: 1

## 2018-01-03 ASSESSMENT — PATIENT HEALTH QUESTIONNAIRE - PHQ9: CLINICAL INTERPRETATION OF PHQ2 SCORE: 0

## 2018-01-04 NOTE — PROGRESS NOTES
"Subjective:      Rachele Madrigal is a 72 y.o. female who presents with Hyperlipidemia (evaluation on blood work results )  The patient is here for followup of chronic medical problems listed below. The patient is compliant with medications and having no side effects from them. Denies chest pain, abdominal pain, dyspnea, myalgias, or cough.   Patient Active Problem List    Diagnosis Date Noted   • Age-related osteoporosis without current pathological fracture- dexa 2016; fosamax started 12/28/2016   • Gastroesophageal reflux disease with esophagitis 11/05/2014   • Vitamin D deficiency disease 10/21/2013   • Impaired fasting glucose 09/28/2012   • Mixed hyperlipidemia 06/14/2012     No Known Allergies  Outpatient Medications Prior to Visit   Medication Sig Dispense Refill   • alendronate (FOSAMAX) 70 MG Tab Take 1 Tab by mouth every 7 days. 13 Tab 4   • fenofibrate micronized (LOFIBRA) 134 MG capsule Take 1 Cap by mouth every morning before breakfast. 90 Cap 4   • Cholecalciferol (VITAMIN D3) 5000 UNITS Cap Take 1 Cap by mouth every day. 90 Cap 4   • naphazoline-pheniramine (OPCON-A) 0.027-0.315 % SOLN Place 1 Drop in both eyes 3 times a day as needed.         No facility-administered medications prior to visit.                HPI    Review of Systems   Constitutional: Negative.    HENT: Negative.    Eyes: Negative.    Respiratory: Negative.    Cardiovascular: Negative.    Gastrointestinal: Negative.    Genitourinary: Negative.    Musculoskeletal: Negative.    Skin: Negative.    Neurological: Negative.    Endo/Heme/Allergies: Negative.    Psychiatric/Behavioral: Negative.           Objective:     /60   Pulse 82   Temp 37 °C (98.6 °F)   Ht 1.651 m (5' 5\")   Wt 59 kg (130 lb)   SpO2 94%   BMI 21.63 kg/m²      Physical Exam   Constitutional: She is oriented to person, place, and time. She appears well-developed and well-nourished. No distress.   HENT:   Head: Normocephalic and atraumatic.   Right Ear: External ear " normal.   Left Ear: External ear normal.   Nose: Nose normal.   Mouth/Throat: Oropharynx is clear and moist. No oropharyngeal exudate.   Eyes: Conjunctivae and EOM are normal. Pupils are equal, round, and reactive to light. Right eye exhibits no discharge. Left eye exhibits no discharge. No scleral icterus.   Neck: Normal range of motion. Neck supple. No JVD present. No tracheal deviation present. No thyromegaly present.   Cardiovascular: Normal rate, regular rhythm, normal heart sounds and intact distal pulses.  Exam reveals no gallop and no friction rub.    No murmur heard.  Pulmonary/Chest: Effort normal and breath sounds normal. No stridor. No respiratory distress. She has no wheezes. She has no rales. She exhibits no tenderness.   Abdominal: Soft. Bowel sounds are normal. She exhibits no distension and no mass. There is no tenderness. There is no rebound and no guarding.   Musculoskeletal: Normal range of motion. She exhibits no edema or tenderness.   Lymphadenopathy:     She has no cervical adenopathy.   Neurological: She is alert and oriented to person, place, and time. She has normal reflexes. She displays normal reflexes. No cranial nerve deficit. She exhibits normal muscle tone. Coordination normal.   Skin: Skin is warm and dry. No rash noted. She is not diaphoretic. No erythema. No pallor.   Psychiatric: She has a normal mood and affect. Her behavior is normal. Judgment and thought content normal.   Vitals reviewed.    Hospital Outpatient Visit on 12/22/2017   Component Date Value   • Sodium 12/22/2017 142    • Potassium 12/22/2017 3.9    • Chloride 12/22/2017 104    • Co2 12/22/2017 28    • Anion Gap 12/22/2017 10.0    • Glucose 12/22/2017 102*   • Bun 12/22/2017 22    • Creatinine 12/22/2017 1.00    • Calcium 12/22/2017 9.9    • AST(SGOT) 12/22/2017 36    • ALT(SGPT) 12/22/2017 47    • Alkaline Phosphatase 12/22/2017 40    • Total Bilirubin 12/22/2017 1.0    • Albumin 12/22/2017 4.4    • Total Protein  12/22/2017 7.5    • Globulin 12/22/2017 3.1    • A-G Ratio 12/22/2017 1.4    • WBC 12/22/2017 4.5*   • RBC 12/22/2017 4.26    • Hemoglobin 12/22/2017 13.7    • Hematocrit 12/22/2017 42.0    • MCV 12/22/2017 98.6*   • MCH 12/22/2017 32.2    • MCHC 12/22/2017 32.6*   • RDW 12/22/2017 43.6    • Platelet Count 12/22/2017 255    • MPV 12/22/2017 11.1    • Neutrophils-Polys 12/22/2017 51.50    • Lymphocytes 12/22/2017 37.20    • Monocytes 12/22/2017 8.00    • Eosinophils 12/22/2017 2.20    • Basophils 12/22/2017 0.90    • Immature Granulocytes 12/22/2017 0.20    • Nucleated RBC 12/22/2017 0.00    • Neutrophils (Absolute) 12/22/2017 2.33    • Lymphs (Absolute) 12/22/2017 1.68    • Monos (Absolute) 12/22/2017 0.36    • Eos (Absolute) 12/22/2017 0.10    • Baso (Absolute) 12/22/2017 0.04    • Immature Granulocytes (a* 12/22/2017 0.01    • NRBC (Absolute) 12/22/2017 0.00    • Cholesterol,Tot 12/22/2017 180    • Triglycerides 12/22/2017 157*   • HDL 12/22/2017 51    • LDL 12/22/2017 98    • TSH 12/22/2017 2.020    • 25-Hydroxy   Vitamin D 25 12/22/2017 40    • GFR If  12/22/2017 >60    • GFR If Non  Ameri* 12/22/2017 55*      Lab Results   Component Value Date/Time    HBA1C 5.9 12/20/2012 09:11 AM     Lab Results   Component Value Date/Time    SODIUM 142 12/22/2017 07:19 AM    POTASSIUM 3.9 12/22/2017 07:19 AM    CHLORIDE 104 12/22/2017 07:19 AM    CO2 28 12/22/2017 07:19 AM    GLUCOSE 102 (H) 12/22/2017 07:19 AM    BUN 22 12/22/2017 07:19 AM    CREATININE 1.00 12/22/2017 07:19 AM    ALKPHOSPHAT 40 12/22/2017 07:19 AM    ASTSGOT 36 12/22/2017 07:19 AM    ALTSGPT 47 12/22/2017 07:19 AM    TBILIRUBIN 1.0 12/22/2017 07:19 AM     No results found for: INR  Lab Results   Component Value Date/Time    CHOLSTRLTOT 180 12/22/2017 07:19 AM    LDL 98 12/22/2017 07:19 AM    HDL 51 12/22/2017 07:19 AM    TRIGLYCERIDE 157 (H) 12/22/2017 07:19 AM       No results found for: TESTOSTERONE  No results found for: TSH  Lab  Results   Component Value Date/Time    FREET4 0.93 10/14/2013 08:48 AM    FREET4 0.78 06/15/2012 07:11 AM     No results found for: URICACID  No components found for: VITB12  Lab Results   Component Value Date/Time    25HYDROXY 40 12/22/2017 07:19 AM    25HYDROXY 42 06/23/2017 07:06 AM               Assessment/Plan:     1. Age-related osteoporosis without current pathological fracture- dexa 2016; fosamax started    Under good control. Continue same regimen.          - alendronate (FOSAMAX) 70 MG Tab; Take 1 Tab by mouth every 7 days.  Dispense: 13 Tab; Refill: 4    2. Mixed hyperlipidemia   Under good control. Continue same regimen.    - fenofibrate micronized (LOFIBRA) 134 MG capsule; Take 1 Cap by mouth every morning before breakfast.  Dispense: 90 Cap; Refill: 4  - LIPID PROFILE; Future  - CBC WITH DIFFERENTIAL; Future  - COMP METABOLIC PANEL; Future    3. Vitamin D deficiency disease   Under good control. Continue same regimen.    - Cholecalciferol (VITAMIN D3) 5000 units Cap; Take 1 Cap by mouth every day.  Dispense: 90 Cap; Refill: 4    30 minute face-to-face encounter took place today.  More than half of this time was spent in the coordination of care of the above problems, as well as counseling.

## 2018-01-22 ENCOUNTER — TELEPHONE (OUTPATIENT)
Dept: MEDICAL GROUP | Age: 73
End: 2018-01-22

## 2018-01-22 NOTE — TELEPHONE ENCOUNTER
PVP WITH OUTREACH  Future Appointments       Provider Department Center    1/30/2018 11:20 AM Doug Carpenter M.D.; [facility] [address]    7/3/2018 8:00 AM Doug Carpenter M.D. [place] [address] [address]          ANNUAL WELLNESS VISIT PRE-VISIT PLANNING     1.  Immunizations were updated in Epic using [employer]?: Epic matches WebIZ       •  [employer] Recommendations: ZOSTAVAX (Shingles)       •  Is patient due for Tdap? NO       •  Is patient due for Shingles? YES. Patient was not notified of copay/out of pocket cost.     2.  Specialty Comments was updated with diagnosis information provided by [facility]: no comments for 2018

## 2018-01-30 ENCOUNTER — OFFICE VISIT (OUTPATIENT)
Dept: MEDICAL GROUP | Age: 73
End: 2018-01-30
Payer: MEDICARE

## 2018-01-30 VITALS
HEART RATE: 88 BPM | WEIGHT: 131.8 LBS | DIASTOLIC BLOOD PRESSURE: 66 MMHG | BODY MASS INDEX: 21.18 KG/M2 | OXYGEN SATURATION: 98 % | HEIGHT: 66 IN | SYSTOLIC BLOOD PRESSURE: 118 MMHG | TEMPERATURE: 97.7 F

## 2018-01-30 DIAGNOSIS — Z00.00 MEDICARE ANNUAL WELLNESS VISIT, SUBSEQUENT: ICD-10-CM

## 2018-01-30 DIAGNOSIS — E55.9 VITAMIN D DEFICIENCY DISEASE: ICD-10-CM

## 2018-01-30 DIAGNOSIS — M81.0 AGE-RELATED OSTEOPOROSIS WITHOUT CURRENT PATHOLOGICAL FRACTURE: ICD-10-CM

## 2018-01-30 DIAGNOSIS — E78.2 MIXED HYPERLIPIDEMIA: ICD-10-CM

## 2018-01-30 DIAGNOSIS — R73.01 IMPAIRED FASTING GLUCOSE: ICD-10-CM

## 2018-01-30 DIAGNOSIS — K21.00 GASTROESOPHAGEAL REFLUX DISEASE WITH ESOPHAGITIS: ICD-10-CM

## 2018-01-30 PROCEDURE — G0439 PPPS, SUBSEQ VISIT: HCPCS | Performed by: INTERNAL MEDICINE

## 2018-01-30 ASSESSMENT — PATIENT HEALTH QUESTIONNAIRE - PHQ9: CLINICAL INTERPRETATION OF PHQ2 SCORE: 0

## 2018-01-30 ASSESSMENT — PAIN SCALES - GENERAL: PAINLEVEL: NO PAIN

## 2018-01-30 NOTE — PROGRESS NOTES
Chief Complaint   Patient presents with   • Annual Wellness Visit     SCP         HPI:  Rachele is a 73 y.o. here for Medicare Annual Wellness Visit          Patient Active Problem List    Diagnosis Date Noted   • Age-related osteoporosis without current pathological fracture- dexa 2016; fosamax started 12/28/2016   • Gastroesophageal reflux disease with esophagitis 11/05/2014   • Vitamin D deficiency disease 10/21/2013   • Impaired fasting glucose 09/28/2012   • Mixed hyperlipidemia 06/14/2012       Current Outpatient Prescriptions   Medication Sig Dispense Refill   • alendronate (FOSAMAX) 70 MG Tab Take 1 Tab by mouth every 7 days. 13 Tab 4   • fenofibrate micronized (LOFIBRA) 134 MG capsule Take 1 Cap by mouth every morning before breakfast. 90 Cap 4   • Cholecalciferol (VITAMIN D3) 5000 units Cap Take 1 Cap by mouth every day. 90 Cap 4   • naphazoline-pheniramine (OPCON-A) 0.027-0.315 % SOLN Place 1 Drop in both eyes 3 times a day as needed.         No current facility-administered medications for this visit.         Patient is taking medications as noted in medication list.  Current supplements as per medication list.     Allergies: Patient has no known allergies.    Current social contact/activities: spend time with family       Is patient current with immunizations? Yes.    She  reports that she has never smoked. She has never used smokeless tobacco. She reports that she does not drink alcohol or use drugs.  Counseling given: Not Answered        DPA/Advanced directive: Patient does not have an Advanced Directive.  A packet and workshop information was given on Advanced Directives.    ROS:    Gait: Uses no assistive device    Ostomy: no    Other tubes: no    Amputations: no    Chronic oxygen use no    Last eye exam 2017    Wears hearing aids: no    : Denies any urinary leakage during the last 6 months       Screening:         Depression Screening    Little interest or pleasure in doing things?  0 - not at  all  Feeling down, depressed, or hopeless? 0 - not at all  Patient Health Questionnaire Score: 0    If depressive symptoms identified deferred to follow up visit unless specifically addressed in assessment and plan.    Interpretation of PHQ-9 Total Score   Score Severity   1-4 No Depression   5-9 Mild Depression   10-14 Moderate Depression   15-19 Moderately Severe Depression   20-27 Severe Depression    Screening for Cognitive Impairment    Three Minute Recall (apple, watch, kaiser)  3/3 Table leader sunset  3/3  Draw clock face with all 12 numbers set to the hand to show 10 minutes past 11 o'clock  0 4/5 spacing      If cognitive concerns identified, deferred for follow up unless specifically addressed in assessment and plan.    Fall Risk Assessment    Has the patient had two or more falls in the last year or any fall with injury in the last year?  No  If fall risk identified, deferred for follow up unless specifically addressed in assessment and plan.    Safety Assessment    Throw rugs on floor.  Yes  Handrails on all stairs.  Yes  Good lighting in all hallways.  Yes  Difficulty hearing.  No  Patient counseled about all safety risks that were identified.    Functional Assessment ADLs    Are there any barriers preventing you from cooking for yourself or meeting nutritional needs?  No.    Are there any barriers preventing you from driving safely or obtaining transportation?  No.    Are there any barriers preventing you from using a telephone or calling for help?  No.    Are there any barriers preventing you from shopping?  No.    Are there any barriers preventing you from taking care of your own finances?  No.    Are there any barriers preventing you from managing your medications?  No.    Are you currently engaging any exercise or physical activity?  Yes.  walking    Health Maintenance Summary                IMM ZOSTER VACCINE Overdue 1/17/2006     Annual Wellness Visit Overdue 1/24/2018      Done 1/23/2017 Visit  "Dx: Medicare annual wellness visit, subsequent     Patient has more history with this topic...    MAMMOGRAM Next Due 3/30/2019      Done 3/30/2017 MA-MAMMO SCREENING BILAT W/TOMOSYNTHESIS W/CAD     Patient has more history with this topic...    BONE DENSITY Next Due 2/8/2021      Done 2/8/2016 DS-BONE DENSITY STUDY (DEXA)    IMM DTaP/Tdap/Td Vaccine Next Due 6/20/2023      Done 6/20/2013 Imm Admin: Tdap Vaccine    COLONOSCOPY Next Due 3/10/2026      Done 3/10/2016 AMB REFERRAL TO GI FOR COLONOSCOPY     Patient has more history with this topic...          Patient Care Team:  Doug Carpenter M.D. as PCP - General (Internal Medicine)  Doug Hu M.D. as Consulting Physician (Ophthalmology)  [facility] as Consulting Physician (Gastroenterology)    Social History   Substance Use Topics   • Smoking status: Never Smoker   • Smokeless tobacco: Never Used   • Alcohol use No     Family History   Problem Relation Age of Onset   • Other Father      kidney failure   • Genetic Paternal Grandfather      liver problems     She  has a past medical history of Allergy.   Past Surgical History:   Procedure Laterality Date   • TONSILLECTOMY  1952           Exam:     Blood pressure 118/66, pulse 88, temperature 36.5 °C (97.7 °F), height 1.664 m (5' 5.5\"), weight 59.8 kg (131 lb 12.8 oz), SpO2 98 %. Body mass index is 21.6 kg/m².    Hearing excellent.    Dentition good  Alert, oriented in no acute distress.  Eye contact is good, speech goal directed, affect calm       Assessment and Plan. The following treatment and monitoring plan is recommended:      1. Medicare annual wellness visit, subsequent      - Annual Wellness Visit - Includes PPPS Subsequent ()    2. Age-related osteoporosis without current pathological fracture- dexa 2016; fosamax started    Under good control. Continue same regimen.    - Annual Wellness Visit - Includes PPPS Subsequent ()    3. Gastroesophageal reflux disease with " esophagitis  Under good control. Continue same regimen.     - Annual Wellness Visit - Includes PPPS Subsequent ()    4. Vitamin D deficiency disease  Under good control. Continue same regimen.   - Annual Wellness Visit - Includes PPPS Subsequent ()    5. Impaired fasting glucose  Under good control. Continue same regimen.     - Annual Wellness Visit - Includes PPPS Subsequent ()    6. Mixed hyperlipidemia  Under good control. Continue same regimen.               - Annual Wellness Visit - Includes PPPS Subsequent ()    30 minute face-to-face encounter took place today.  More than half of this time was spent in the coordination of care of the above problems, as well as counseling.       Services suggested: No services needed at this time  Health Care Screening recommendations as per orders if indicated.  Referrals offered: PT/OT/Nutrition counseling/Behavioral Health/Smoking cessation as per orders if indicated.    Discussion today about general wellness and lifestyle habits:    · Prevent falls and reduce trip hazards; Cautioned about securing or removing rugs.  · Have a working fire alarm and carbon monoxide detector;   · Engage in regular physical activity and social activities       Follow-up: No Follow-up on file.

## 2018-03-08 ENCOUNTER — OFFICE VISIT (OUTPATIENT)
Dept: URGENT CARE | Facility: PHYSICIAN GROUP | Age: 73
End: 2018-03-08
Payer: MEDICARE

## 2018-03-08 ENCOUNTER — HOSPITAL ENCOUNTER (OUTPATIENT)
Facility: MEDICAL CENTER | Age: 73
End: 2018-03-08
Attending: PHYSICIAN ASSISTANT
Payer: MEDICARE

## 2018-03-08 VITALS
HEART RATE: 94 BPM | TEMPERATURE: 97 F | SYSTOLIC BLOOD PRESSURE: 122 MMHG | DIASTOLIC BLOOD PRESSURE: 68 MMHG | BODY MASS INDEX: 21.05 KG/M2 | HEIGHT: 66 IN | OXYGEN SATURATION: 95 % | WEIGHT: 131 LBS

## 2018-03-08 DIAGNOSIS — R10.30 LOWER ABDOMINAL PAIN: ICD-10-CM

## 2018-03-08 LAB
APPEARANCE UR: CLEAR
BILIRUB UR STRIP-MCNC: NORMAL MG/DL
BUN SERPL-MCNC: 21 MG/DL (ref 8–22)
COLOR UR AUTO: YELLOW
CREAT SERPL-MCNC: 1 MG/DL (ref 0.5–1.4)
GLUCOSE UR STRIP.AUTO-MCNC: NORMAL MG/DL
KETONES UR STRIP.AUTO-MCNC: NORMAL MG/DL
LEUKOCYTE ESTERASE UR QL STRIP.AUTO: NORMAL
NITRITE UR QL STRIP.AUTO: NORMAL
PH UR STRIP.AUTO: 5 [PH] (ref 5–8)
PROT UR QL STRIP: NORMAL MG/DL
RBC UR QL AUTO: NORMAL
SP GR UR STRIP.AUTO: 1.01
UROBILINOGEN UR STRIP-MCNC: NORMAL MG/DL

## 2018-03-08 PROCEDURE — 82565 ASSAY OF CREATININE: CPT

## 2018-03-08 PROCEDURE — 81002 URINALYSIS NONAUTO W/O SCOPE: CPT | Performed by: PHYSICIAN ASSISTANT

## 2018-03-08 PROCEDURE — 36415 COLL VENOUS BLD VENIPUNCTURE: CPT | Performed by: PHYSICIAN ASSISTANT

## 2018-03-08 PROCEDURE — 84520 ASSAY OF UREA NITROGEN: CPT

## 2018-03-08 PROCEDURE — 99000 SPECIMEN HANDLING OFFICE-LAB: CPT | Performed by: PHYSICIAN ASSISTANT

## 2018-03-08 PROCEDURE — 99214 OFFICE O/P EST MOD 30 MIN: CPT | Performed by: PHYSICIAN ASSISTANT

## 2018-03-08 ASSESSMENT — ENCOUNTER SYMPTOMS
FATIGUE: 0
CHANGE IN BOWEL HABIT: 0
DIZZINESS: 0
NAUSEA: 0
ABDOMINAL PAIN: 1
CONSTIPATION: 0
CHILLS: 0
DIARRHEA: 0
SHORTNESS OF BREATH: 0
FEVER: 0
VOMITING: 0
MUSCULOSKELETAL NEGATIVE: 1

## 2018-03-08 NOTE — PROGRESS NOTES
"Subjective:      Rachele Madrigal is a 73 y.o. female who presents with GI Problem (Low abdomen discomfort/gas/wakes patient up at night x1 week )            GI Problem   This is a new problem. The current episode started 1 to 4 weeks ago (1 week). Episode frequency: nightly. The problem has been unchanged. Associated symptoms include abdominal pain. Pertinent negatives include no change in bowel habit, chest pain, chills, congestion, fatigue, fever, nausea, rash or vomiting. Nothing aggravates the symptoms. She has tried nothing for the symptoms.     Patient presents to urgent care reporting a 1 week history of periumbilical abdominal pain. She states she doesn't usually feel the pain throughout the daytime, but it often wakes her up at night. She has had some relief of the pain with bowel movements. She denies fevers, chills, body aches, constipation, diarrhea, nausea, or vomiting. She has no known medical problems and doesn't take any regular medications. No personal or family history of colon disease. She is UTD on colonoscopies and reports they have been normal. No history of abdominal surgeries.     Review of Systems   Constitutional: Negative for chills, fatigue and fever.   HENT: Negative for congestion.    Respiratory: Negative for shortness of breath.    Cardiovascular: Negative for chest pain.   Gastrointestinal: Positive for abdominal pain. Negative for change in bowel habit, constipation, diarrhea, nausea and vomiting.   Genitourinary: Negative.    Musculoskeletal: Negative.    Skin: Negative for rash.   Neurological: Negative for dizziness.        Objective:     /68   Pulse 94   Temp 36.1 °C (97 °F)   Ht 1.664 m (5' 5.5\")   Wt 59.4 kg (131 lb)   SpO2 95%   BMI 21.47 kg/m²      Physical Exam   Constitutional: She is oriented to person, place, and time. She appears well-developed and well-nourished. No distress.   HENT:   Head: Normocephalic and atraumatic.   Eyes: Pupils are equal, round, and " reactive to light.   Neck: Normal range of motion.   Cardiovascular: Normal rate.    Pulmonary/Chest: Effort normal.   Abdominal: Soft. Normal appearance and bowel sounds are normal. She exhibits no distension and no mass. There is tenderness in the periumbilical area. There is no rebound, no guarding, no CVA tenderness, no tenderness at McBurney's point and negative Naik's sign. No hernia.   Slight tenderness to deep palpation of periumbilical region   Musculoskeletal: Normal range of motion.   Neurological: She is alert and oriented to person, place, and time.   Skin: Skin is warm and dry. She is not diaphoretic.   Psychiatric: She has a normal mood and affect. Her behavior is normal.   Nursing note and vitals reviewed.         PMH:  has a past medical history of Allergy.  MEDS:   Current Outpatient Prescriptions:   •  Omeprazole Magnesium 20.6 (20 Base) MG CAPSULE DELAYED RELEASE, Take  by mouth., Disp: , Rfl:   •  fenofibrate micronized (LOFIBRA) 134 MG capsule, Take 1 Cap by mouth every morning before breakfast., Disp: 90 Cap, Rfl: 4  •  Cholecalciferol (VITAMIN D3) 5000 units Cap, Take 1 Cap by mouth every day., Disp: 90 Cap, Rfl: 4  •  alendronate (FOSAMAX) 70 MG Tab, Take 1 Tab by mouth every 7 days. (Patient not taking: Reported on 3/8/2018), Disp: 13 Tab, Rfl: 4  •  naphazoline-pheniramine (OPCON-A) 0.027-0.315 % SOLN, Place 1 Drop in both eyes 3 times a day as needed.  , Disp: , Rfl:   ALLERGIES: No Known Allergies  SURGHX:   Past Surgical History:   Procedure Laterality Date   • TONSILLECTOMY  1952     SOCHX:  reports that she has never smoked. She has never used smokeless tobacco. She reports that she does not drink alcohol or use drugs.  FH: family history includes Genetic in her paternal grandfather; Other in her father.    POCT Urinalysis:  Component Results     Component Value Ref Range & Units Status   POC Color YELLOW  Negative Final   POC Appearance CLEAR  Negative Final   POC Leukocyte  Esterase NEG  Negative Final   POC Nitrites NEG  Negative Final   POC Urobiligen NEG  Negative (0.2) mg/dL Final   POC Protein NEG  Negative mg/dL Final   POC Urine PH 5.0  5.0 - 8.0 Final   POC Blood NEG  Negative Final   POC Specific Gravity 1.015  <1.005 - >1.030 Final   POC Ketones NEG  Negative mg/dL Final   POC Bilirubin NEG  Negative mg/dL Final   POC Glucose NEG  Negative mg/dL Final   Last Resulted Time   Thu Mar 8, 2018  8:40 AM        Assessment/Plan:     1. Lower abdominal pain  - POCT Urinalysis --> normal  - BUN+CREAT  - CT-ABDOMEN-PELVIS WITH; Future    Patient would like to undergo CT-abdomen. She is unable to have one performed today due to personal schedule conflicts. CT scheduled for tomorrow, pending results. ED precautions given for any severe abdominal pain or development of fevers, chills, body aches, nausea, vomiting, etc. in the meantime. The patient demonstrated a good understanding and agreed with the treatment plan.

## 2018-03-09 ENCOUNTER — HOSPITAL ENCOUNTER (OUTPATIENT)
Dept: RADIOLOGY | Facility: MEDICAL CENTER | Age: 73
End: 2018-03-09
Attending: PHYSICIAN ASSISTANT
Payer: MEDICARE

## 2018-03-09 ENCOUNTER — TELEPHONE (OUTPATIENT)
Dept: URGENT CARE | Facility: CLINIC | Age: 73
End: 2018-03-09

## 2018-03-09 DIAGNOSIS — R10.30 LOWER ABDOMINAL PAIN: ICD-10-CM

## 2018-03-09 PROCEDURE — 700117 HCHG RX CONTRAST REV CODE 255: Performed by: PHYSICIAN ASSISTANT

## 2018-03-09 PROCEDURE — 74177 CT ABD & PELVIS W/CONTRAST: CPT

## 2018-03-09 RX ADMIN — IOHEXOL 100 ML: 350 INJECTION, SOLUTION INTRAVENOUS at 12:04

## 2018-03-09 RX ADMIN — IOHEXOL 50 ML: 240 INJECTION, SOLUTION INTRATHECAL; INTRAVASCULAR; INTRAVENOUS; ORAL at 11:01

## 2018-03-09 NOTE — TELEPHONE ENCOUNTER
Spoke to patient regarding normal CT-abdomen/pelvis results. She states she is feeling much better today and appreciates the phone call.

## 2018-06-27 ENCOUNTER — HOSPITAL ENCOUNTER (OUTPATIENT)
Dept: LAB | Facility: MEDICAL CENTER | Age: 73
End: 2018-06-27
Attending: INTERNAL MEDICINE
Payer: MEDICARE

## 2018-06-27 DIAGNOSIS — E78.2 MIXED HYPERLIPIDEMIA: ICD-10-CM

## 2018-06-27 LAB
ALBUMIN SERPL BCP-MCNC: 4.3 G/DL (ref 3.2–4.9)
ALBUMIN/GLOB SERPL: 1.3 G/DL
ALP SERPL-CCNC: 34 U/L (ref 30–99)
ALT SERPL-CCNC: 20 U/L (ref 2–50)
ANION GAP SERPL CALC-SCNC: 9 MMOL/L (ref 0–11.9)
AST SERPL-CCNC: 23 U/L (ref 12–45)
BASOPHILS # BLD AUTO: 0.7 % (ref 0–1.8)
BASOPHILS # BLD: 0.03 K/UL (ref 0–0.12)
BILIRUB SERPL-MCNC: 0.9 MG/DL (ref 0.1–1.5)
BUN SERPL-MCNC: 25 MG/DL (ref 8–22)
CALCIUM SERPL-MCNC: 9.3 MG/DL (ref 8.5–10.5)
CHLORIDE SERPL-SCNC: 105 MMOL/L (ref 96–112)
CHOLEST SERPL-MCNC: 171 MG/DL (ref 100–199)
CO2 SERPL-SCNC: 27 MMOL/L (ref 20–33)
CREAT SERPL-MCNC: 1.03 MG/DL (ref 0.5–1.4)
EOSINOPHIL # BLD AUTO: 0.12 K/UL (ref 0–0.51)
EOSINOPHIL NFR BLD: 2.9 % (ref 0–6.9)
ERYTHROCYTE [DISTWIDTH] IN BLOOD BY AUTOMATED COUNT: 42.1 FL (ref 35.9–50)
GLOBULIN SER CALC-MCNC: 3.4 G/DL (ref 1.9–3.5)
GLUCOSE SERPL-MCNC: 118 MG/DL (ref 65–99)
HCT VFR BLD AUTO: 41.4 % (ref 37–47)
HDLC SERPL-MCNC: 38 MG/DL
HGB BLD-MCNC: 13.2 G/DL (ref 12–16)
IMM GRANULOCYTES # BLD AUTO: 0.01 K/UL (ref 0–0.11)
IMM GRANULOCYTES NFR BLD AUTO: 0.2 % (ref 0–0.9)
LDLC SERPL CALC-MCNC: 102 MG/DL
LYMPHOCYTES # BLD AUTO: 1.56 K/UL (ref 1–4.8)
LYMPHOCYTES NFR BLD: 37.1 % (ref 22–41)
MCH RBC QN AUTO: 30.8 PG (ref 27–33)
MCHC RBC AUTO-ENTMCNC: 31.9 G/DL (ref 33.6–35)
MCV RBC AUTO: 96.5 FL (ref 81.4–97.8)
MONOCYTES # BLD AUTO: 0.32 K/UL (ref 0–0.85)
MONOCYTES NFR BLD AUTO: 7.6 % (ref 0–13.4)
NEUTROPHILS # BLD AUTO: 2.16 K/UL (ref 2–7.15)
NEUTROPHILS NFR BLD: 51.5 % (ref 44–72)
NRBC # BLD AUTO: 0 K/UL
NRBC BLD-RTO: 0 /100 WBC
PLATELET # BLD AUTO: 285 K/UL (ref 164–446)
PMV BLD AUTO: 11 FL (ref 9–12.9)
POTASSIUM SERPL-SCNC: 4 MMOL/L (ref 3.6–5.5)
PROT SERPL-MCNC: 7.7 G/DL (ref 6–8.2)
RBC # BLD AUTO: 4.29 M/UL (ref 4.2–5.4)
SODIUM SERPL-SCNC: 141 MMOL/L (ref 135–145)
TRIGL SERPL-MCNC: 155 MG/DL (ref 0–149)
WBC # BLD AUTO: 4.2 K/UL (ref 4.8–10.8)

## 2018-06-27 PROCEDURE — 85025 COMPLETE CBC W/AUTO DIFF WBC: CPT

## 2018-06-27 PROCEDURE — 36415 COLL VENOUS BLD VENIPUNCTURE: CPT

## 2018-06-27 PROCEDURE — 80061 LIPID PANEL: CPT

## 2018-06-27 PROCEDURE — 80053 COMPREHEN METABOLIC PANEL: CPT

## 2018-06-28 ENCOUNTER — TELEPHONE (OUTPATIENT)
Dept: MEDICAL GROUP | Age: 73
End: 2018-06-28

## 2018-06-28 NOTE — TELEPHONE ENCOUNTER
Future Appointments       Provider Department Center    7/3/2018 8:00 AM Doug Carpenter M.D. [facility] [address]        ESTABLISHED PATIENT PRE-VISIT PLANNING     Note: Patient will not be contacted if there is no indication to call.     1.  Reviewed notes from the last few office visits within the medical group: Yes    2.  If any orders were placed at last visit or intended to be done for this visit (i.e. 6 mos follow-up), do we have Results/Consult Notes?        •  Labs - Labs ordered, completed on 6/27/18 and results are in chart.   Note: If patient appointment is for lab review and patient did not complete labs, check with provider if OK to reschedule patient until labs completed.       •  Imaging - Imaging ordered, completed and results are in chart.       •  Referrals - No referrals were ordered at last office visit.    3. Is this appointment scheduled as a Hospital Follow-Up? No    4.  Immunizations were updated in Epic using WebIZ?: Epic matches WebIZ       •  Web Iz Recommendations: Patient is up to date on all vaccines    5.  Patient is due for the following Health Maintenance Topics:   There are no preventive care reminders to display for this patient.    - Patient .  - Patient is up-to-date on all Health Maintenance topics. No records have been requested at this time.    6.  MDX printed for Provider? YES    7.  Patient was NOT informed to arrive 15 min prior to their scheduled appointment and bring in their medication bottles.

## 2018-07-03 ENCOUNTER — OFFICE VISIT (OUTPATIENT)
Dept: MEDICAL GROUP | Age: 73
End: 2018-07-03
Payer: MEDICARE

## 2018-07-03 VITALS
WEIGHT: 129 LBS | TEMPERATURE: 98.8 F | HEART RATE: 66 BPM | SYSTOLIC BLOOD PRESSURE: 118 MMHG | BODY MASS INDEX: 20.73 KG/M2 | HEIGHT: 66 IN | OXYGEN SATURATION: 94 % | DIASTOLIC BLOOD PRESSURE: 62 MMHG

## 2018-07-03 DIAGNOSIS — E55.9 VITAMIN D DEFICIENCY DISEASE: ICD-10-CM

## 2018-07-03 DIAGNOSIS — E78.2 MIXED HYPERLIPIDEMIA: ICD-10-CM

## 2018-07-03 DIAGNOSIS — M81.0 AGE-RELATED OSTEOPOROSIS WITHOUT CURRENT PATHOLOGICAL FRACTURE: ICD-10-CM

## 2018-07-03 DIAGNOSIS — R73.01 IMPAIRED FASTING GLUCOSE: ICD-10-CM

## 2018-07-03 DIAGNOSIS — K21.00 GASTROESOPHAGEAL REFLUX DISEASE WITH ESOPHAGITIS: ICD-10-CM

## 2018-07-03 PROCEDURE — 99214 OFFICE O/P EST MOD 30 MIN: CPT | Performed by: INTERNAL MEDICINE

## 2018-07-03 RX ORDER — OMEPRAZOLE 40 MG/1
40 CAPSULE, DELAYED RELEASE ORAL DAILY
Qty: 90 CAP | Refills: 4 | Status: SHIPPED | OUTPATIENT
Start: 2018-07-03 | End: 2019-01-14 | Stop reason: SDUPTHER

## 2018-07-03 ASSESSMENT — ENCOUNTER SYMPTOMS
NEUROLOGICAL NEGATIVE: 1
EYES NEGATIVE: 1
CARDIOVASCULAR NEGATIVE: 1
PSYCHIATRIC NEGATIVE: 1
MUSCULOSKELETAL NEGATIVE: 1
RESPIRATORY NEGATIVE: 1
CONSTITUTIONAL NEGATIVE: 1
GASTROINTESTINAL NEGATIVE: 1

## 2018-07-03 NOTE — PROGRESS NOTES
"Subjective:      Rachele Madrigal is a 73 y.o. female who presents with Follow-Up    The patient is here for followup of chronic medical problems listed below. The patient is compliant with medications and having no side effects from them. Denies chest pain, abdominal pain, dyspnea, myalgias, or cough.   Patient Active Problem List    Diagnosis Date Noted   • Age-related osteoporosis without current pathological fracture- dexa 2016; fosamax started 12/28/2016   • Gastroesophageal reflux disease with esophagitis 11/05/2014   • Vitamin D deficiency disease 10/21/2013   • Impaired fasting glucose 09/28/2012   • Mixed hyperlipidemia 06/14/2012     No Known Allergies  .  Outpatient Medications Prior to Visit   Medication Sig Dispense Refill   • fenofibrate micronized (LOFIBRA) 134 MG capsule Take 1 Cap by mouth every morning before breakfast. 90 Cap 4   • Cholecalciferol (VITAMIN D3) 5000 units Cap Take 1 Cap by mouth every day. 90 Cap 4   • naphazoline-pheniramine (OPCON-A) 0.027-0.315 % SOLN Place 1 Drop in both eyes 3 times a day as needed.       • Omeprazole Magnesium 20.6 (20 Base) MG CAPSULE DELAYED RELEASE Take  by mouth.     • alendronate (FOSAMAX) 70 MG Tab Take 1 Tab by mouth every 7 days. 13 Tab 4     No facility-administered medications prior to visit.        .a        HPI    Review of Systems   Constitutional: Negative.    HENT: Negative.    Eyes: Negative.    Respiratory: Negative.    Cardiovascular: Negative.    Gastrointestinal: Negative.    Genitourinary: Negative.    Musculoskeletal: Negative.    Skin: Negative.    Neurological: Negative.    Endo/Heme/Allergies: Negative.    Psychiatric/Behavioral: Negative.           Objective:     /62   Pulse 66   Temp 37.1 °C (98.8 °F)   Ht 1.664 m (5' 5.51\")   Wt 58.5 kg (129 lb)   SpO2 94%   BMI 21.13 kg/m²      Physical Exam   Constitutional: She is oriented to person, place, and time. She appears well-developed and well-nourished. No distress.   HENT: "   Head: Normocephalic and atraumatic.   Right Ear: External ear normal.   Left Ear: External ear normal.   Nose: Nose normal.   Mouth/Throat: Oropharynx is clear and moist. No oropharyngeal exudate.   Eyes: Conjunctivae and EOM are normal. Pupils are equal, round, and reactive to light. Right eye exhibits no discharge. Left eye exhibits no discharge. No scleral icterus.   Neck: Normal range of motion. Neck supple. No JVD present. No tracheal deviation present. No thyromegaly present.   Cardiovascular: Normal rate, regular rhythm, normal heart sounds and intact distal pulses.  Exam reveals no gallop and no friction rub.    No murmur heard.  Pulmonary/Chest: Effort normal and breath sounds normal. No stridor. No respiratory distress. She has no wheezes. She has no rales. She exhibits no tenderness.   Abdominal: Soft. Bowel sounds are normal. She exhibits no distension and no mass. There is no tenderness. There is no rebound and no guarding.   Musculoskeletal: Normal range of motion. She exhibits no edema or tenderness.   Lymphadenopathy:     She has no cervical adenopathy.   Neurological: She is alert and oriented to person, place, and time. She has normal reflexes. She displays normal reflexes. No cranial nerve deficit. She exhibits normal muscle tone. Coordination normal.   Skin: Skin is warm and dry. No rash noted. She is not diaphoretic. No erythema. No pallor.   Psychiatric: She has a normal mood and affect. Her behavior is normal. Judgment and thought content normal.   Vitals reviewed.    Hospital Outpatient Visit on 06/27/2018   Component Date Value   • Cholesterol,Tot 06/27/2018 171    • Triglycerides 06/27/2018 155*   • HDL 06/27/2018 38*   • LDL 06/27/2018 102*   • WBC 06/27/2018 4.2*   • RBC 06/27/2018 4.29    • Hemoglobin 06/27/2018 13.2    • Hematocrit 06/27/2018 41.4    • MCV 06/27/2018 96.5    • MCH 06/27/2018 30.8    • MCHC 06/27/2018 31.9*   • RDW 06/27/2018 42.1    • Platelet Count 06/27/2018 285     • MPV 06/27/2018 11.0    • Neutrophils-Polys 06/27/2018 51.50    • Lymphocytes 06/27/2018 37.10    • Monocytes 06/27/2018 7.60    • Eosinophils 06/27/2018 2.90    • Basophils 06/27/2018 0.70    • Immature Granulocytes 06/27/2018 0.20    • Nucleated RBC 06/27/2018 0.00    • Neutrophils (Absolute) 06/27/2018 2.16    • Lymphs (Absolute) 06/27/2018 1.56    • Monos (Absolute) 06/27/2018 0.32    • Eos (Absolute) 06/27/2018 0.12    • Baso (Absolute) 06/27/2018 0.03    • Immature Granulocytes (a* 06/27/2018 0.01    • NRBC (Absolute) 06/27/2018 0.00    • Sodium 06/27/2018 141    • Potassium 06/27/2018 4.0    • Chloride 06/27/2018 105    • Co2 06/27/2018 27    • Anion Gap 06/27/2018 9.0    • Glucose 06/27/2018 118*   • Bun 06/27/2018 25*   • Creatinine 06/27/2018 1.03    • Calcium 06/27/2018 9.3    • AST(SGOT) 06/27/2018 23    • ALT(SGPT) 06/27/2018 20    • Alkaline Phosphatase 06/27/2018 34    • Total Bilirubin 06/27/2018 0.9    • Albumin 06/27/2018 4.3    • Total Protein 06/27/2018 7.7    • Globulin 06/27/2018 3.4    • A-G Ratio 06/27/2018 1.3    • GFR If  06/27/2018 >60    • GFR If Non  Ameri* 06/27/2018 53*      Lab Results   Component Value Date/Time    HBA1C 5.9 12/20/2012 09:11 AM     Lab Results   Component Value Date/Time    SODIUM 141 06/27/2018 07:07 AM    POTASSIUM 4.0 06/27/2018 07:07 AM    CHLORIDE 105 06/27/2018 07:07 AM    CO2 27 06/27/2018 07:07 AM    GLUCOSE 118 (H) 06/27/2018 07:07 AM    BUN 25 (H) 06/27/2018 07:07 AM    CREATININE 1.03 06/27/2018 07:07 AM    ALKPHOSPHAT 34 06/27/2018 07:07 AM    ASTSGOT 23 06/27/2018 07:07 AM    ALTSGPT 20 06/27/2018 07:07 AM    TBILIRUBIN 0.9 06/27/2018 07:07 AM     No results found for: INR  Lab Results   Component Value Date/Time    CHOLSTRLTOT 171 06/27/2018 07:07 AM     (H) 06/27/2018 07:07 AM    HDL 38 (A) 06/27/2018 07:07 AM    TRIGLYCERIDE 155 (H) 06/27/2018 07:07 AM       No results found for: TESTOSTERONE  No results found for:  TSH  Lab Results   Component Value Date/Time    FREET4 0.93 10/14/2013 08:48 AM    FREET4 0.78 06/15/2012 07:11 AM     No results found for: URICACID  No components found for: VITB12  Lab Results   Component Value Date/Time    25HYDROXY 40 12/22/2017 07:19 AM    25HYDROXY 42 06/23/2017 07:06 AM               Assessment/Plan:     1. Gastroesophageal reflux disease with esophagitis  Under good control. Continue same regimen.      - omeprazole (PRILOSEC) 40 MG delayed-release capsule; Take 1 Cap by mouth every day.  Dispense: 90 Cap; Refill: 4    2. Mixed hyperlipidemia    Under good control. Continue same regimen.    - COMP METABOLIC PANEL; Future  - LIPID PROFILE; Future  - CBC WITH DIFFERENTIAL; Future    3. Impaired fasting glucose   Under good control. Continue same regimen.    - HEMOGLOBIN A1C; Future    4. Vitamin D deficiency disease   Under good control. Continue same regimen.      5. Age-related osteoporosis without current pathological fracture- dexa 2016; fosamax started     Under good control. Continue same regimen.      30 minute face-to-face encounter took place today.  More than half of this time was spent in the coordination of care of the above problems, as well as counseling.

## 2018-07-23 ENCOUNTER — OFFICE VISIT (OUTPATIENT)
Dept: URGENT CARE | Facility: PHYSICIAN GROUP | Age: 73
End: 2018-07-23
Payer: MEDICARE

## 2018-07-23 VITALS
TEMPERATURE: 97.7 F | BODY MASS INDEX: 20.73 KG/M2 | WEIGHT: 129 LBS | RESPIRATION RATE: 18 BRPM | HEIGHT: 66 IN | DIASTOLIC BLOOD PRESSURE: 70 MMHG | OXYGEN SATURATION: 95 % | HEART RATE: 67 BPM | SYSTOLIC BLOOD PRESSURE: 138 MMHG

## 2018-07-23 DIAGNOSIS — M54.32 SCIATICA OF LEFT SIDE: ICD-10-CM

## 2018-07-23 DIAGNOSIS — S39.012A STRAIN OF LUMBAR REGION, INITIAL ENCOUNTER: ICD-10-CM

## 2018-07-23 LAB
APPEARANCE UR: NORMAL
BILIRUB UR STRIP-MCNC: NORMAL MG/DL
COLOR UR AUTO: NORMAL
GLUCOSE UR STRIP.AUTO-MCNC: NORMAL MG/DL
KETONES UR STRIP.AUTO-MCNC: NORMAL MG/DL
LEUKOCYTE ESTERASE UR QL STRIP.AUTO: NORMAL
NITRITE UR QL STRIP.AUTO: NORMAL
PH UR STRIP.AUTO: 6 [PH] (ref 5–8)
PROT UR QL STRIP: NORMAL MG/DL
RBC UR QL AUTO: NORMAL
SP GR UR STRIP.AUTO: 1.01
UROBILINOGEN UR STRIP-MCNC: NORMAL MG/DL

## 2018-07-23 PROCEDURE — 99214 OFFICE O/P EST MOD 30 MIN: CPT | Performed by: PHYSICIAN ASSISTANT

## 2018-07-23 PROCEDURE — 81002 URINALYSIS NONAUTO W/O SCOPE: CPT | Performed by: PHYSICIAN ASSISTANT

## 2018-07-23 RX ORDER — CYCLOBENZAPRINE HCL 5 MG
5 TABLET ORAL EVERY 8 HOURS PRN
Qty: 20 TAB | Refills: 0 | Status: SHIPPED | OUTPATIENT
Start: 2018-07-23 | End: 2019-01-14

## 2018-07-23 ASSESSMENT — ENCOUNTER SYMPTOMS
MYALGIAS: 0
ABDOMINAL PAIN: 0
FOCAL WEAKNESS: 0
FEVER: 0
TINGLING: 0
COUGH: 0
CHILLS: 0
VOMITING: 0
DIARRHEA: 0
NAUSEA: 0
BACK PAIN: 1
SENSORY CHANGE: 0
SHORTNESS OF BREATH: 0
FLANK PAIN: 0
NECK PAIN: 0

## 2018-07-23 ASSESSMENT — PAIN SCALES - GENERAL: PAINLEVEL: 9=SEVERE PAIN

## 2018-07-23 NOTE — PROGRESS NOTES
Subjective:   Rachele Madrigal is a 73 y.o. female who presents for Back Pain (lower back pain, x 3 days)        Back Pain   This is a new problem. The current episode started in the past 7 days (d3d). The problem occurs constantly. Pertinent negatives include no abdominal pain, chest pain, dysuria, fever or tingling.   Notes last 3d of pain to left low back, denies fever/chills or cough/ST or feeling like she is sick, c/o some radiation of pain down left leg, notes pain causing sensation of weakness but denies weakness to legs or hands, reports normal sensation to hands and feet, tried using tylenol this am which helped w/ pain, Denies saddle anesthesia, incontinence of urine or bowel, retention of urine or bowel, also denies numbness/tingling or weakness to UE/LE.     Patient denies signs and symptoms of urinary tract infection. She denies dysuria, frequency, urgency, hematuria. She denies past medical history of complicated urinary tract infection. She denies past medical history of pyelonephritis. She denies past medical history of back surgery or significant back injuries. She denies recent trauma or injury to back. She reports tingling nerve type pain begins just below the belt line on left buttock and radiates down stopping just above left knee and posterior thigh     Review of Systems   Constitutional: Negative for chills and fever.   Respiratory: Negative for cough and shortness of breath.    Cardiovascular: Negative for chest pain and leg swelling.   Gastrointestinal: Negative for abdominal pain, diarrhea, nausea and vomiting.   Genitourinary: Negative for dysuria, flank pain, frequency, hematuria and urgency.   Musculoskeletal: Positive for back pain. Negative for myalgias and neck pain.        POS for left LBP   Skin: Negative for rash.   Neurological: Negative for tingling, sensory change and focal weakness.        Complains of radicular pain on left side posterior thigh     No Known Allergies   Objective:  "  /70   Pulse 67   Temp 36.5 °C (97.7 °F)   Resp 18   Ht 1.664 m (5' 5.5\")   Wt 58.5 kg (129 lb)   SpO2 95%   BMI 21.14 kg/m²   Physical Exam   Constitutional: She is oriented to person, place, and time. She appears well-developed and well-nourished. No distress.   HENT:   Head: Normocephalic and atraumatic.   Right Ear: External ear normal.   Left Ear: External ear normal.   Nose: Nose normal.   Eyes: Conjunctivae and lids are normal. Right eye exhibits no discharge. Left eye exhibits no discharge. No scleral icterus.   Neck: Neck supple.   Pulmonary/Chest: Effort normal. No respiratory distress.   Musculoskeletal: Normal range of motion.        Lumbar back: She exhibits tenderness ( primary paraspinous), pain and spasm. She exhibits no bony tenderness, no swelling, no edema, no deformity, no laceration and normal pulse. Decreased range of motion:  2/2 pain.        Back:    Neurological: She is alert and oriented to person, place, and time. She has normal strength and normal reflexes. She is not disoriented. No sensory deficit. She displays a negative Romberg sign. Coordination normal.   SLR normal bilat   Skin: Skin is warm and dry. She is not diaphoretic. No erythema. No pallor.   Psychiatric: Her speech is normal and behavior is normal.   Nursing note and vitals reviewed.        Assessment/Plan:   Assessment    1. Sciatica of left side  - REFERRAL TO ORTHOPEDICS  - cyclobenzaprine (FLEXERIL) 5 MG tablet; Take 1 Tab by mouth every 8 hours as needed for Severe Pain or Muscle Spasms.  Dispense: 20 Tab; Refill: 0    2. Strain of lumbar region, initial encounter  - POCT Urinalysis  Recommend conservative care, rest, heat, work on gentle ROM exercises, sent w/ stretches, will focus on tylenol, heat / ice and use flexeril only for breakthrough pain, f/u w/ ortho w/ recurrence  Return to clinic with lack of resolution or progression of symptoms.  Cautioned regarding potential for sedation with " medication.    Differential diagnosis, natural history, supportive care, and indications for immediate follow-up discussed.

## 2018-09-21 ENCOUNTER — NON-PROVIDER VISIT (OUTPATIENT)
Dept: MEDICAL GROUP | Facility: PHYSICIAN GROUP | Age: 73
End: 2018-09-21
Payer: MEDICARE

## 2018-09-21 DIAGNOSIS — Z23 INFLUENZA VACCINE NEEDED: ICD-10-CM

## 2018-09-21 DIAGNOSIS — Z23 NEED FOR VACCINATION: ICD-10-CM

## 2018-09-21 PROCEDURE — G0008 ADMIN INFLUENZA VIRUS VAC: HCPCS | Performed by: FAMILY MEDICINE

## 2018-09-21 PROCEDURE — 90662 IIV NO PRSV INCREASED AG IM: CPT | Performed by: FAMILY MEDICINE

## 2018-09-21 NOTE — PROGRESS NOTES
"Rachele Madrigal is a 73 y.o. female here for a non-provider visit for:   FLU    Reason for immunization: Annual Flu Vaccine  Immunization records indicate need for vaccine: Yes, confirmed with Epic  Minimum interval has been met for this vaccine: Yes  ABN completed: Yes    Order and dose verified by: mo  VIS Dated   was given to patient: Yes  All IAC Questionnaire questions were answered \"No.\"    Patient tolerated injection and no adverse effects were observed or reported: Yes    Pt scheduled for next dose in series: Not Indicated  "

## 2019-01-11 ENCOUNTER — HOSPITAL ENCOUNTER (OUTPATIENT)
Dept: LAB | Facility: MEDICAL CENTER | Age: 74
End: 2019-01-11
Attending: INTERNAL MEDICINE
Payer: MEDICARE

## 2019-01-11 DIAGNOSIS — E78.2 MIXED HYPERLIPIDEMIA: ICD-10-CM

## 2019-01-11 DIAGNOSIS — R73.01 IMPAIRED FASTING GLUCOSE: ICD-10-CM

## 2019-01-11 LAB
ALBUMIN SERPL BCP-MCNC: 4.7 G/DL (ref 3.2–4.9)
ALBUMIN/GLOB SERPL: 1.6 G/DL
ALP SERPL-CCNC: 27 U/L (ref 30–99)
ALT SERPL-CCNC: 17 U/L (ref 2–50)
ANION GAP SERPL CALC-SCNC: 9 MMOL/L (ref 0–11.9)
AST SERPL-CCNC: 18 U/L (ref 12–45)
BASOPHILS # BLD AUTO: 1 % (ref 0–1.8)
BASOPHILS # BLD: 0.04 K/UL (ref 0–0.12)
BILIRUB SERPL-MCNC: 0.8 MG/DL (ref 0.1–1.5)
BUN SERPL-MCNC: 24 MG/DL (ref 8–22)
CALCIUM SERPL-MCNC: 9.3 MG/DL (ref 8.5–10.5)
CHLORIDE SERPL-SCNC: 106 MMOL/L (ref 96–112)
CHOLEST SERPL-MCNC: 193 MG/DL (ref 100–199)
CO2 SERPL-SCNC: 26 MMOL/L (ref 20–33)
CREAT SERPL-MCNC: 0.93 MG/DL (ref 0.5–1.4)
EOSINOPHIL # BLD AUTO: 0.11 K/UL (ref 0–0.51)
EOSINOPHIL NFR BLD: 2.6 % (ref 0–6.9)
ERYTHROCYTE [DISTWIDTH] IN BLOOD BY AUTOMATED COUNT: 42.7 FL (ref 35.9–50)
EST. AVERAGE GLUCOSE BLD GHB EST-MCNC: 137 MG/DL
FASTING STATUS PATIENT QL REPORTED: NORMAL
GLOBULIN SER CALC-MCNC: 2.9 G/DL (ref 1.9–3.5)
GLUCOSE SERPL-MCNC: 113 MG/DL (ref 65–99)
HBA1C MFR BLD: 6.4 % (ref 0–5.6)
HCT VFR BLD AUTO: 41.5 % (ref 37–47)
HDLC SERPL-MCNC: 42 MG/DL
HGB BLD-MCNC: 13.5 G/DL (ref 12–16)
IMM GRANULOCYTES # BLD AUTO: 0.01 K/UL (ref 0–0.11)
IMM GRANULOCYTES NFR BLD AUTO: 0.2 % (ref 0–0.9)
LDLC SERPL CALC-MCNC: 126 MG/DL
LYMPHOCYTES # BLD AUTO: 1.54 K/UL (ref 1–4.8)
LYMPHOCYTES NFR BLD: 36.7 % (ref 22–41)
MCH RBC QN AUTO: 31.5 PG (ref 27–33)
MCHC RBC AUTO-ENTMCNC: 32.5 G/DL (ref 33.6–35)
MCV RBC AUTO: 96.7 FL (ref 81.4–97.8)
MONOCYTES # BLD AUTO: 0.31 K/UL (ref 0–0.85)
MONOCYTES NFR BLD AUTO: 7.4 % (ref 0–13.4)
NEUTROPHILS # BLD AUTO: 2.19 K/UL (ref 2–7.15)
NEUTROPHILS NFR BLD: 52.1 % (ref 44–72)
NRBC # BLD AUTO: 0 K/UL
NRBC BLD-RTO: 0 /100 WBC
PLATELET # BLD AUTO: 268 K/UL (ref 164–446)
PMV BLD AUTO: 11.1 FL (ref 9–12.9)
POTASSIUM SERPL-SCNC: 3.8 MMOL/L (ref 3.6–5.5)
PROT SERPL-MCNC: 7.6 G/DL (ref 6–8.2)
RBC # BLD AUTO: 4.29 M/UL (ref 4.2–5.4)
SODIUM SERPL-SCNC: 141 MMOL/L (ref 135–145)
TRIGL SERPL-MCNC: 124 MG/DL (ref 0–149)
WBC # BLD AUTO: 4.2 K/UL (ref 4.8–10.8)

## 2019-01-11 PROCEDURE — 83036 HEMOGLOBIN GLYCOSYLATED A1C: CPT

## 2019-01-11 PROCEDURE — 36415 COLL VENOUS BLD VENIPUNCTURE: CPT

## 2019-01-11 PROCEDURE — 80061 LIPID PANEL: CPT

## 2019-01-11 PROCEDURE — 85025 COMPLETE CBC W/AUTO DIFF WBC: CPT

## 2019-01-11 PROCEDURE — 80053 COMPREHEN METABOLIC PANEL: CPT

## 2019-01-14 ENCOUNTER — OFFICE VISIT (OUTPATIENT)
Dept: MEDICAL GROUP | Age: 74
End: 2019-01-14
Payer: MEDICARE

## 2019-01-14 VITALS
BODY MASS INDEX: 20.89 KG/M2 | TEMPERATURE: 97.9 F | OXYGEN SATURATION: 96 % | HEART RATE: 68 BPM | SYSTOLIC BLOOD PRESSURE: 116 MMHG | HEIGHT: 66 IN | DIASTOLIC BLOOD PRESSURE: 70 MMHG | WEIGHT: 130 LBS

## 2019-01-14 DIAGNOSIS — E55.9 VITAMIN D DEFICIENCY DISEASE: ICD-10-CM

## 2019-01-14 DIAGNOSIS — E78.2 MIXED HYPERLIPIDEMIA: ICD-10-CM

## 2019-01-14 DIAGNOSIS — M81.0 AGE-RELATED OSTEOPOROSIS WITHOUT CURRENT PATHOLOGICAL FRACTURE: ICD-10-CM

## 2019-01-14 DIAGNOSIS — K21.00 GASTROESOPHAGEAL REFLUX DISEASE WITH ESOPHAGITIS: ICD-10-CM

## 2019-01-14 DIAGNOSIS — R73.01 IMPAIRED FASTING GLUCOSE: ICD-10-CM

## 2019-01-14 PROCEDURE — 99214 OFFICE O/P EST MOD 30 MIN: CPT | Performed by: INTERNAL MEDICINE

## 2019-01-14 PROCEDURE — 8041 PR SCP AHA: Performed by: INTERNAL MEDICINE

## 2019-01-14 RX ORDER — ALENDRONATE SODIUM 70 MG/1
70 TABLET ORAL
Qty: 13 TAB | Refills: 4 | Status: SHIPPED | OUTPATIENT
Start: 2019-01-14 | End: 2019-07-09 | Stop reason: SINTOL

## 2019-01-14 RX ORDER — OMEPRAZOLE 40 MG/1
40 CAPSULE, DELAYED RELEASE ORAL DAILY
Qty: 90 CAP | Refills: 4 | Status: SHIPPED | OUTPATIENT
Start: 2019-01-14 | End: 2019-06-04 | Stop reason: SDUPTHER

## 2019-01-14 RX ORDER — FENOFIBRATE 134 MG/1
134 CAPSULE ORAL
Qty: 90 CAP | Refills: 4 | Status: SHIPPED | OUTPATIENT
Start: 2019-01-14 | End: 2019-11-05 | Stop reason: SDUPTHER

## 2019-01-14 ASSESSMENT — ENCOUNTER SYMPTOMS
PSYCHIATRIC NEGATIVE: 1
DIZZINESS: 1
CARDIOVASCULAR NEGATIVE: 1
ABDOMINAL PAIN: 0
COUGH: 0
CONSTITUTIONAL NEGATIVE: 1
MUSCULOSKELETAL NEGATIVE: 1
EYES NEGATIVE: 1
GASTROINTESTINAL NEGATIVE: 1
RESPIRATORY NEGATIVE: 1

## 2019-01-14 ASSESSMENT — PATIENT HEALTH QUESTIONNAIRE - PHQ9: CLINICAL INTERPRETATION OF PHQ2 SCORE: 0

## 2019-01-14 NOTE — PROGRESS NOTES
Subjective:     Rachele Madrigal is a 73 y.o. female who presents with Follow-Up (lab review)    HPI  The patient presents today for follow-up visit regarding her most recent labs. At this time patient is doing well and only complains of intermittent dizziness. She denies any chest pain, shortness of breath or abdominal pain. Patient has already received her influenza vaccine this year.     In addition, the patient is also here for follow up of chronic medical problems listed below. The patient is compliant with their medications and is having no side effects from them.    History of Vitamin D deficiency currently controlled with cholecalciferol 5000 units QD.     History of GERD with esophagitis currently controlled with omeprazole 40 mg QD.     History of osteoporosis currently controlled wit alendronate 70 mg QWeek. Patient started alendronate in 2016 following DEXA scan.     History of hyperlipidemia currently controlled with fenofibrate micronized 134 mg QAM. Lipid panel performed on 1/11/19 reported a cholesterol of 193, triglycerides of 124, HDL of 42, and elevated LDL of 126. Compared to values reported on 7/3/18, her cholesterol increased (was 171), triglycerides decreased (155), HDL increased (38), and LDL increased (102). Patient states she has never taken any statins or other medications for hyperlipidemia in the past.     Patient Active Problem List   Diagnosis   • Mixed hyperlipidemia- TG > 300   • Impaired fasting glucose   • Vitamin D deficiency disease   • Gastroesophageal reflux disease with esophagitis   • Age-related osteoporosis without current pathological fracture- dexa 2016; fosamax started       Outpatient Medications Prior to Visit   Medication Sig Dispense Refill   • cyclobenzaprine (FLEXERIL) 5 MG tablet Take 1 Tab by mouth every 8 hours as needed for Severe Pain or Muscle Spasms. 20 Tab 0   • omeprazole (PRILOSEC) 40 MG delayed-release capsule Take 1 Cap by mouth every day. 90 Cap 4   •  "alendronate (FOSAMAX) 70 MG Tab Take 1 Tab by mouth every 7 days. 13 Tab 4   • fenofibrate micronized (LOFIBRA) 134 MG capsule Take 1 Cap by mouth every morning before breakfast. 90 Cap 4   • Cholecalciferol (VITAMIN D3) 5000 units Cap Take 1 Cap by mouth every day. 90 Cap 4   • naphazoline-pheniramine (OPCON-A) 0.027-0.315 % SOLN Place 1 Drop in both eyes 3 times a day as needed.         No facility-administered medications prior to visit.         No Known Allergies    Review of Systems   Constitutional: Negative.    HENT: Negative.    Eyes: Negative.    Respiratory: Negative.  Negative for cough.    Cardiovascular: Negative.  Negative for chest pain.   Gastrointestinal: Negative.  Negative for abdominal pain.   Genitourinary: Negative.    Musculoskeletal: Negative.    Skin: Negative.    Neurological: Positive for dizziness.   Endo/Heme/Allergies: Negative.    Psychiatric/Behavioral: Negative.    All other systems reviewed and are negative.         Objective:     /70 (BP Location: Left arm, Patient Position: Sitting)   Pulse 68   Temp 36.6 °C (97.9 °F) (Temporal)   Ht 1.664 m (5' 5.51\")   Wt 59 kg (130 lb)   SpO2 96%   BMI 21.30 kg/m²     Physical Exam   Constitutional: Oriented to person, place, and time. Appears well-developed and well-nourished. No distress.   Head: Normocephalic and atraumatic.   Right Ear: External ear normal.   Left Ear: External ear normal.   Nose: Nose normal.   Mouth/Throat: Oropharynx is clear and moist. No oropharyngeal exudate.   Eyes: Pupils are equal, round, and reactive to light. Conjunctivae and EOM are normal. Right eye exhibits no discharge. Left eye exhibits no discharge. No scleral icterus.   Neck: Normal range of motion. Neck supple. No JVD present. No tracheal deviation present. No thyromegaly present.   Cardiovascular: Normal rate, regular rhythm, normal heart sounds and intact distal pulses.  Exam reveals no gallop and no friction rub.    No murmur " heard.  Pulmonary/Chest: Effort normal. No stridor. No respiratory distress. No wheezing or rales. No tenderness.   Abdominal: Soft. Bowel sounds are normal. No distension and no mass. There is no tenderness. There is no rebound and no guarding. No hernia.   Musculoskeletal: Normal range of motion No edema or tenderness.   Lymphadenopathy: No cervical adenopathy.   Neurological: Alert and oriented to person, place, and time. Normal reflexes. Normal reflexes. No cranial nerve deficit. Normal muscle tone. Coordination normal.   Skin: Skin is warm and dry. No rash noted. Not diaphoretic. No erythema. No pallor.   Psychiatric: Normal mood and affect. Behavior is normal. Judgment and thought content normal.   Nursing note and vitals reviewed.    Hospital Outpatient Visit on 01/11/2019   Component Date Value   • Sodium 01/11/2019 141    • Potassium 01/11/2019 3.8    • Chloride 01/11/2019 106    • Co2 01/11/2019 26    • Anion Gap 01/11/2019 9.0    • Glucose 01/11/2019 113*   • Bun 01/11/2019 24*   • Creatinine 01/11/2019 0.93    • Calcium 01/11/2019 9.3    • AST(SGOT) 01/11/2019 18    • ALT(SGPT) 01/11/2019 17    • Alkaline Phosphatase 01/11/2019 27*   • Total Bilirubin 01/11/2019 0.8    • Albumin 01/11/2019 4.7    • Total Protein 01/11/2019 7.6    • Globulin 01/11/2019 2.9    • A-G Ratio 01/11/2019 1.6    • Cholesterol,Tot 01/11/2019 193    • Triglycerides 01/11/2019 124    • HDL 01/11/2019 42    • LDL 01/11/2019 126*   • WBC 01/11/2019 4.2*   • RBC 01/11/2019 4.29    • Hemoglobin 01/11/2019 13.5    • Hematocrit 01/11/2019 41.5    • MCV 01/11/2019 96.7    • MCH 01/11/2019 31.5    • MCHC 01/11/2019 32.5*   • RDW 01/11/2019 42.7    • Platelet Count 01/11/2019 268    • MPV 01/11/2019 11.1    • Neutrophils-Polys 01/11/2019 52.10    • Lymphocytes 01/11/2019 36.70    • Monocytes 01/11/2019 7.40    • Eosinophils 01/11/2019 2.60    • Basophils 01/11/2019 1.00    • Immature Granulocytes 01/11/2019 0.20    • Nucleated RBC  01/11/2019 0.00    • Neutrophils (Absolute) 01/11/2019 2.19    • Lymphs (Absolute) 01/11/2019 1.54    • Monos (Absolute) 01/11/2019 0.31    • Eos (Absolute) 01/11/2019 0.11    • Baso (Absolute) 01/11/2019 0.04    • Immature Granulocytes (a* 01/11/2019 0.01    • NRBC (Absolute) 01/11/2019 0.00    • Glycohemoglobin 01/11/2019 6.4*   • Est Avg Glucose 01/11/2019 137    • Fasting Status 01/11/2019 Fasting    • GFR If  01/11/2019 >60    • GFR If Non  [employer]meri* 01/11/2019 59*      Lab Results   Component Value Date/Time    HBA1C 6.4 (H) 01/11/2019 07:45 AM    HBA1C 5.9 12/20/2012 09:11 AM     Lab Results   Component Value Date/Time    SODIUM 141 01/11/2019 07:45 AM    POTASSIUM 3.8 01/11/2019 07:45 AM    CHLORIDE 106 01/11/2019 07:45 AM    CO2 26 01/11/2019 07:45 AM    GLUCOSE 113 (H) 01/11/2019 07:45 AM    BUN 24 (H) 01/11/2019 07:45 AM    CREATININE 0.93 01/11/2019 07:45 AM    ALKPHOSPHAT 27 (L) 01/11/2019 07:45 AM    ASTSGOT 18 01/11/2019 07:45 AM    ALTSGPT 17 01/11/2019 07:45 AM    TBILIRUBIN 0.8 01/11/2019 07:45 AM     No results found for: INR  Lab Results   Component Value Date/Time    CHOLSTRLTOT 193 01/11/2019 07:45 AM     (H) 01/11/2019 07:45 AM    HDL 42 01/11/2019 07:45 AM    TRIGLYCERIDE 124 01/11/2019 07:45 AM       No results found for: TESTOSTERONE  No results found for: TSH  Lab Results   Component Value Date/Time    FREET4 0.93 10/14/2013 08:48 AM    FREET4 0.78 06/15/2012 07:11 AM     No results found for: URICACID  No components found for: VITB12  Lab Results   Component Value Date/Time    25HYDROXY 40 12/22/2017 07:19 AM    25HYDROXY 42 06/23/2017 07:06 AM          Assessment/Plan:     1. Mixed hyperlipidemia  Under good control. Continue same regimen. Lipid panel performed on 1/11/19 reported a cholesterol of 193, triglycerides of 124, HDL of 42, and elevated LDL of 126. Compared to values reported on 7/3/18, her cholesterol increased (was 171), triglycerides decreased  (155), HDL increased (38), and LDL increased (102). Patient counseled to exercise and diet.   - fenofibrate micronized (LOFIBRA) 134 MG capsule; Take 1 Cap by mouth every morning before breakfast.  Dispense: 90 Cap; Refill: 4  - COMP METABOLIC PANEL; Future  - Lipid Profile; Future  - CBC WITH DIFFERENTIAL; Future    2. Impaired fasting glucose  Under good control. Continue same regimen. Chem panel performed on 1/11/19 reported an elevated glucose value of 113.     3. Vitamin D deficiency disease  Under good control. Continue same regimen.   - VITAMIN D,25 HYDROXY; Future    4. Gastroesophageal reflux disease with esophagitis  Under good control. Continue same regimen.   - omeprazole (PRILOSEC) 40 MG delayed-release capsule; Take 1 Cap by mouth every day.  Dispense: 90 Cap; Refill: 4    5. Age-related osteoporosis without current pathological fracture- dexa 2016; fosamax started  Under good control. Continue same regimen.   - alendronate (FOSAMAX) 70 MG Tab; Take 1 Tab by mouth every 7 days.  Dispense: 13 Tab; Refill: 4     Follow-up in 6 months.     40 minute face-to-face encounter took place today.  More than half of this time was spent in the coordination of care of the above problems, as well as counseling.    IDeangelo (Scribe), am scribing for, and in the presence of, Doug Carpenter M.D.    Electronically signed by: Deangelo Cerna (Scribe), 1/14/2019    Doug CRUZ M.D., personally performed the services described in this documentation, as scribed by Deangelo Cerna in my presence, and it is both accurate and complete.

## 2019-03-20 ENCOUNTER — TELEPHONE (OUTPATIENT)
Dept: MEDICAL GROUP | Age: 74
End: 2019-03-20

## 2019-03-20 DIAGNOSIS — Z12.31 ENCOUNTER FOR SCREENING MAMMOGRAM FOR BREAST CANCER: ICD-10-CM

## 2019-05-22 ENCOUNTER — OFFICE VISIT (OUTPATIENT)
Dept: URGENT CARE | Facility: PHYSICIAN GROUP | Age: 74
End: 2019-05-22
Payer: MEDICARE

## 2019-05-22 ENCOUNTER — APPOINTMENT (OUTPATIENT)
Dept: RADIOLOGY | Facility: MEDICAL CENTER | Age: 74
End: 2019-05-22
Payer: MEDICARE

## 2019-05-22 ENCOUNTER — HOSPITAL ENCOUNTER (EMERGENCY)
Facility: MEDICAL CENTER | Age: 74
End: 2019-05-22
Attending: EMERGENCY MEDICINE
Payer: MEDICARE

## 2019-05-22 ENCOUNTER — APPOINTMENT (OUTPATIENT)
Dept: RADIOLOGY | Facility: MEDICAL CENTER | Age: 74
End: 2019-05-22
Attending: EMERGENCY MEDICINE
Payer: MEDICARE

## 2019-05-22 VITALS
DIASTOLIC BLOOD PRESSURE: 71 MMHG | HEART RATE: 66 BPM | OXYGEN SATURATION: 92 % | SYSTOLIC BLOOD PRESSURE: 146 MMHG | RESPIRATION RATE: 18 BRPM | TEMPERATURE: 98.1 F | BODY MASS INDEX: 21.41 KG/M2 | WEIGHT: 128.53 LBS | HEIGHT: 65 IN

## 2019-05-22 VITALS
OXYGEN SATURATION: 94 % | DIASTOLIC BLOOD PRESSURE: 72 MMHG | HEIGHT: 66 IN | WEIGHT: 125 LBS | SYSTOLIC BLOOD PRESSURE: 140 MMHG | BODY MASS INDEX: 20.09 KG/M2 | TEMPERATURE: 97.7 F | RESPIRATION RATE: 16 BRPM | HEART RATE: 65 BPM

## 2019-05-22 DIAGNOSIS — R07.9 CHEST PAIN, UNSPECIFIED TYPE: ICD-10-CM

## 2019-05-22 DIAGNOSIS — K21.9 GASTROESOPHAGEAL REFLUX DISEASE, ESOPHAGITIS PRESENCE NOT SPECIFIED: ICD-10-CM

## 2019-05-22 DIAGNOSIS — R10.13 EPIGASTRIC PAIN: ICD-10-CM

## 2019-05-22 LAB
ALBUMIN SERPL BCP-MCNC: 4.9 G/DL (ref 3.2–4.9)
ALBUMIN/GLOB SERPL: 1.6 G/DL
ALP SERPL-CCNC: 31 U/L (ref 30–99)
ALT SERPL-CCNC: 17 U/L (ref 2–50)
ANION GAP SERPL CALC-SCNC: 11 MMOL/L (ref 0–11.9)
AST SERPL-CCNC: 18 U/L (ref 12–45)
BASOPHILS # BLD AUTO: 0.7 % (ref 0–1.8)
BASOPHILS # BLD: 0.04 K/UL (ref 0–0.12)
BILIRUB SERPL-MCNC: 0.7 MG/DL (ref 0.1–1.5)
BUN SERPL-MCNC: 25 MG/DL (ref 8–22)
CALCIUM SERPL-MCNC: 9.2 MG/DL (ref 8.5–10.5)
CHLORIDE SERPL-SCNC: 105 MMOL/L (ref 96–112)
CO2 SERPL-SCNC: 25 MMOL/L (ref 20–33)
CREAT SERPL-MCNC: 0.92 MG/DL (ref 0.5–1.4)
EKG IMPRESSION: NORMAL
EOSINOPHIL # BLD AUTO: 0.13 K/UL (ref 0–0.51)
EOSINOPHIL NFR BLD: 2.2 % (ref 0–6.9)
ERYTHROCYTE [DISTWIDTH] IN BLOOD BY AUTOMATED COUNT: 42.2 FL (ref 35.9–50)
GLOBULIN SER CALC-MCNC: 3 G/DL (ref 1.9–3.5)
GLUCOSE SERPL-MCNC: 111 MG/DL (ref 65–99)
HCT VFR BLD AUTO: 43.6 % (ref 37–47)
HGB BLD-MCNC: 13.9 G/DL (ref 12–16)
IMM GRANULOCYTES # BLD AUTO: 0.01 K/UL (ref 0–0.11)
IMM GRANULOCYTES NFR BLD AUTO: 0.2 % (ref 0–0.9)
LYMPHOCYTES # BLD AUTO: 2.05 K/UL (ref 1–4.8)
LYMPHOCYTES NFR BLD: 34.3 % (ref 22–41)
MCH RBC QN AUTO: 30.5 PG (ref 27–33)
MCHC RBC AUTO-ENTMCNC: 31.9 G/DL (ref 33.6–35)
MCV RBC AUTO: 95.6 FL (ref 81.4–97.8)
MONOCYTES # BLD AUTO: 0.39 K/UL (ref 0–0.85)
MONOCYTES NFR BLD AUTO: 6.5 % (ref 0–13.4)
NEUTROPHILS # BLD AUTO: 3.36 K/UL (ref 2–7.15)
NEUTROPHILS NFR BLD: 56.1 % (ref 44–72)
NRBC # BLD AUTO: 0 K/UL
NRBC BLD-RTO: 0 /100 WBC
PLATELET # BLD AUTO: 273 K/UL (ref 164–446)
PMV BLD AUTO: 10.4 FL (ref 9–12.9)
POTASSIUM SERPL-SCNC: 3.8 MMOL/L (ref 3.6–5.5)
PROT SERPL-MCNC: 7.9 G/DL (ref 6–8.2)
RBC # BLD AUTO: 4.56 M/UL (ref 4.2–5.4)
SODIUM SERPL-SCNC: 141 MMOL/L (ref 135–145)
TROPONIN I SERPL-MCNC: <0.01 NG/ML (ref 0–0.04)
WBC # BLD AUTO: 6 K/UL (ref 4.8–10.8)

## 2019-05-22 PROCEDURE — 85025 COMPLETE CBC W/AUTO DIFF WBC: CPT

## 2019-05-22 PROCEDURE — 93005 ELECTROCARDIOGRAM TRACING: CPT | Performed by: EMERGENCY MEDICINE

## 2019-05-22 PROCEDURE — 76705 ECHO EXAM OF ABDOMEN: CPT

## 2019-05-22 PROCEDURE — 80053 COMPREHEN METABOLIC PANEL: CPT

## 2019-05-22 PROCEDURE — 99214 OFFICE O/P EST MOD 30 MIN: CPT | Performed by: NURSE PRACTITIONER

## 2019-05-22 PROCEDURE — 93005 ELECTROCARDIOGRAM TRACING: CPT

## 2019-05-22 PROCEDURE — 99284 EMERGENCY DEPT VISIT MOD MDM: CPT

## 2019-05-22 PROCEDURE — 93000 ELECTROCARDIOGRAM COMPLETE: CPT | Performed by: NURSE PRACTITIONER

## 2019-05-22 PROCEDURE — 71045 X-RAY EXAM CHEST 1 VIEW: CPT

## 2019-05-22 PROCEDURE — 84484 ASSAY OF TROPONIN QUANT: CPT

## 2019-05-22 ASSESSMENT — ENCOUNTER SYMPTOMS
STRIDOR: 0
PALPITATIONS: 0
WHEEZING: 0
COUGH: 1
PND: 0
CHILLS: 0
ORTHOPNEA: 0
SORE THROAT: 0
DIAPHORESIS: 0
CLAUDICATION: 0
DIARRHEA: 0
SHORTNESS OF BREATH: 0
ABDOMINAL PAIN: 0
NAUSEA: 0
MUSCULOSKELETAL NEGATIVE: 1
FEVER: 0
HEADACHES: 0
DIZZINESS: 0
VOMITING: 0
BLURRED VISION: 0
CONSTIPATION: 0
DOUBLE VISION: 0

## 2019-05-22 NOTE — ED TRIAGE NOTES
".Rachele Madrigal  .  Chief Complaint   Patient presents with   • Sent from Urgent Care     sent by urgent care for further eval of epigastric pain.   • Abdominal Pain     patient c/o epigastric pain and belching x 4 days.     Patient to triage with above complaint. .Blood Pressure : 146/71, Pulse: 67, Respiration: 18, Temperature: 36.7 °C (98.1 °F), Height: 165.1 cm (5' 5\"), Weight: 58.3 kg (128 lb 8.5 oz), Pulse Oximetry: 96 %    EKG called for in triage.  Patient to lobby and instructed to inform staff of any needs.  "

## 2019-05-22 NOTE — PROGRESS NOTES
Subjective:   Rachele Madrigal is a 74 y.o. female who presents for Chest Pain (x 4 days, on and off, sternum pain, burping constently)        Chest Pain    This is a new problem. The current episode started in the past 7 days (Started 3-4 days ago). The problem occurs intermittently. The problem has been waxing and waning. The pain is present in the epigastric region. The pain is moderate. The quality of the pain is described as burning, sharp and stabbing. Radiates to: LUQ. Associated symptoms include a cough. Pertinent negatives include no abdominal pain, claudication, diaphoresis, dizziness, fever, headaches, nausea, orthopnea, palpitations, PND, shortness of breath or vomiting. The pain is aggravated by nothing. Treatments tried: Has been taking Omeprazole as prescribed. The treatment provided no relief. Risk factors include being elderly and lack of exercise.   Has been belching more than usual. Denies recent travel outside the country.  Denies recent fall or trauma. Denies SOB. With chronic dry cough. Denies cardiac history.  Patient with hx of GERD and has been taking Omeprazole as prescribed.    Review of Systems   Constitutional: Negative for chills, diaphoresis and fever.   HENT: Negative for congestion, ear discharge, ear pain and sore throat.    Eyes: Negative for blurred vision and double vision.   Respiratory: Positive for cough. Negative for shortness of breath, wheezing and stridor.    Cardiovascular: Positive for chest pain. Negative for palpitations, orthopnea, claudication, leg swelling and PND.   Gastrointestinal: Negative for abdominal pain, constipation, diarrhea, nausea and vomiting.   Musculoskeletal: Negative.    Skin: Negative.  Negative for itching and rash.   Neurological: Negative for dizziness and headaches.   All other systems reviewed and are negative.    PMH:  has a past medical history of Allergy.  MEDS:   Current Outpatient Prescriptions:   •  alendronate (FOSAMAX) 70 MG Tab, Take 1 Tab  "by mouth every 7 days., Disp: 13 Tab, Rfl: 4  •  fenofibrate micronized (LOFIBRA) 134 MG capsule, Take 1 Cap by mouth every morning before breakfast., Disp: 90 Cap, Rfl: 4  •  omeprazole (PRILOSEC) 40 MG delayed-release capsule, Take 1 Cap by mouth every day., Disp: 90 Cap, Rfl: 4  •  Cholecalciferol (VITAMIN D3) 5000 units Cap, Take 1 Cap by mouth every day., Disp: 90 Cap, Rfl: 4  •  naphazoline-pheniramine (OPCON-A) 0.027-0.315 % SOLN, Place 1 Drop in both eyes 3 times a day as needed.  , Disp: , Rfl:   ALLERGIES: No Known Allergies  SURGHX:   Past Surgical History:   Procedure Laterality Date   • TONSILLECTOMY  1952     SOCHX:  reports that she has never smoked. She has never used smokeless tobacco. She reports that she does not drink alcohol or use drugs.  FH: Family history was reviewed, no pertinent findings to report     Objective:   /72   Pulse 65   Temp 36.5 °C (97.7 °F) (Temporal)   Resp 16   Ht 1.664 m (5' 5.51\")   Wt 56.7 kg (125 lb)   SpO2 94%   BMI 20.48 kg/m²   Physical Exam   Constitutional: She is oriented to person, place, and time. She appears well-developed and well-nourished. No distress.   HENT:   Head: Normocephalic.   Right Ear: Hearing, tympanic membrane and ear canal normal. Tympanic membrane is not erythematous. No middle ear effusion.   Left Ear: Hearing, tympanic membrane and ear canal normal. Tympanic membrane is not erythematous.  No middle ear effusion.   Nose: No rhinorrhea. Right sinus exhibits no maxillary sinus tenderness and no frontal sinus tenderness. Left sinus exhibits no maxillary sinus tenderness and no frontal sinus tenderness.   Mouth/Throat: Oropharynx is clear and moist and mucous membranes are normal.   Eyes: Pupils are equal, round, and reactive to light. Conjunctivae, EOM and lids are normal.   Neck: Normal range of motion. No thyromegaly present.   Cardiovascular: Normal rate, regular rhythm and normal heart sounds.    No murmur heard.  Pulmonary/Chest: " Effort normal and breath sounds normal. No respiratory distress. She has no wheezes.   Abdominal: Soft. Normal appearance and bowel sounds are normal. There is tenderness in the epigastric area.   Mild epigastric tenderness   Lymphadenopathy:        Head (right side): No submandibular and no tonsillar adenopathy present.        Head (left side): No submandibular and no tonsillar adenopathy present.   Neurological: She is alert and oriented to person, place, and time.   Skin: Skin is warm and dry. No rash noted. She is not diaphoretic.   Psychiatric: She has a normal mood and affect. Her behavior is normal. Judgment and thought content normal.   Vitals reviewed.        Assessment/Plan:   Assessment    1. Gastroesophageal reflux disease, esophagitis presence not specified  - REFERRAL TO GASTROENTEROLOGY    2. Chest pain, unspecified type  - EKG    EKG without any acute changes    Although patient is tender to the epigastric region, she is complaining of chest pain and worrisome for advanced age and increasing GERD despite being on Omeprazole.  Patient sent to ER for further evaluation to rule out cardiac cause.  Called [facility] and spoke to Brady, Care Coordinator and aware patient is arriving.    Differential diagnosis, natural history, supportive care, and indications for immediate follow-up discussed.

## 2019-05-23 NOTE — ED PROVIDER NOTES
ED Provider Note    CHIEF COMPLAINT  Chief Complaint   Patient presents with   • Sent from Urgent Care     sent by urgent care for further eval of epigastric pain.   • Abdominal Pain     patient c/o epigastric pain and belching x 4 days.       HPI  Rachele Madrigal is a 74 y.o. female who presents with abdominal pain that is been going on for a couple weeks since at night left upper quadrant in the day she is okay.  On Saturday at increased periods occasionally worse with food but not always.  She is never had a before.  She has some belching as well she was seen by her primary care who placed her on Prilosec.  She has some relief but is persisting so they have had her come to the ER for evaluation.  Patient does not have any chest pain arm pain neck pain back pain shortness of breath dysuria nausea vomiting diarrhea or melena or hematochezia.  She has been on Fosamax which she thinks is bothering her and she came off 2 weeks ago.  She is not on any anti-inflammatories all other systems are negative    REVIEW OF SYSTEMS  See HPI for further details    PAST MEDICAL HISTORY  Past Medical History:   Diagnosis Date   • Allergy        FAMILY HISTORY  Family History   Problem Relation Age of Onset   • Other Father         kidney failure   • Genetic Paternal Grandfather         liver problems       SOCIAL HISTORY  Social History     Social History   • Marital status:      Spouse name: N/A   • Number of children: N/A   • Years of education: N/A     Social History Main Topics   • Smoking status: Never Smoker   • Smokeless tobacco: Never Used   • Alcohol use No   • Drug use: No   • Sexual activity: No     Other Topics Concern   • Not on file     Social History Narrative   • No narrative on file       SURGICAL HISTORY  Past Surgical History:   Procedure Laterality Date   • TONSILLECTOMY  1952       CURRENT MEDICATIONS  Home Medications     Reviewed by Shayy Villanueva R.N. (Registered Nurse) on 05/22/19 at 1524  Med List  "Status: Partial   Medication Last Dose Status   alendronate (FOSAMAX) 70 MG Tab  Active   Cholecalciferol (VITAMIN D3) 5000 units Cap  Active   fenofibrate micronized (LOFIBRA) 134 MG capsule  Active   naphazoline-pheniramine (OPCON-A) 0.027-0.315 % SOLN  Active   omeprazole (PRILOSEC) 40 MG delayed-release capsule  Active                ALLERGIES  No Known Allergies    PHYSICAL EXAM  VITAL SIGNS: /71   Pulse 63   Temp 36.7 °C (98.1 °F) (Temporal)   Resp 18   Ht 1.651 m (5' 5\")   Wt 58.3 kg (128 lb 8.5 oz)   SpO2 95%   BMI 21.39 kg/m²     Constitutional: Patient is alert and oriented x3 in no distress   HENT: Moist mucous membranes  Eyes:   No conjunctivitis or icterus  Neck: Trach is midline  Lymphatic: Negative anterior cervical lymphadenopathy  Cardiovascular: Normal heart rate    Thorax & Lungs: Clear to auscultation  Back: No CVA tenderness  Abdomen: Nontender  Neurologic: Normal motor sensation  Extremities: No edema  Psychiatric: Affect normal, Judgment normal, Mood normal.   Results for orders placed or performed during the hospital encounter of 05/22/19   CBC with Differential   Result Value Ref Range    WBC 6.0 4.8 - 10.8 K/uL    RBC 4.56 4.20 - 5.40 M/uL    Hemoglobin 13.9 12.0 - 16.0 g/dL    Hematocrit 43.6 37.0 - 47.0 %    MCV 95.6 81.4 - 97.8 fL    MCH 30.5 27.0 - 33.0 pg    MCHC 31.9 (L) 33.6 - 35.0 g/dL    RDW 42.2 35.9 - 50.0 fL    Platelet Count 273 164 - 446 K/uL    MPV 10.4 9.0 - 12.9 fL    Neutrophils-Polys 56.10 44.00 - 72.00 %    Lymphocytes 34.30 22.00 - 41.00 %    Monocytes 6.50 0.00 - 13.40 %    Eosinophils 2.20 0.00 - 6.90 %    Basophils 0.70 0.00 - 1.80 %    Immature Granulocytes 0.20 0.00 - 0.90 %    Nucleated RBC 0.00 /100 WBC    Neutrophils (Absolute) 3.36 2.00 - 7.15 K/uL    Lymphs (Absolute) 2.05 1.00 - 4.80 K/uL    Monos (Absolute) 0.39 0.00 - 0.85 K/uL    Eos (Absolute) 0.13 0.00 - 0.51 K/uL    Baso (Absolute) 0.04 0.00 - 0.12 K/uL    Immature Granulocytes (abs) 0.01 " 0.00 - 0.11 K/uL    NRBC (Absolute) 0.00 K/uL   Complete Metabolic Panel (CMP)   Result Value Ref Range    Sodium 141 135 - 145 mmol/L    Potassium 3.8 3.6 - 5.5 mmol/L    Chloride 105 96 - 112 mmol/L    Co2 25 20 - 33 mmol/L    Anion Gap 11.0 0.0 - 11.9    Glucose 111 (H) 65 - 99 mg/dL    Bun 25 (H) 8 - 22 mg/dL    Creatinine 0.92 0.50 - 1.40 mg/dL    Calcium 9.2 8.5 - 10.5 mg/dL    AST(SGOT) 18 12 - 45 U/L    ALT(SGPT) 17 2 - 50 U/L    Alkaline Phosphatase 31 30 - 99 U/L    Total Bilirubin 0.7 0.1 - 1.5 mg/dL    Albumin 4.9 3.2 - 4.9 g/dL    Total Protein 7.9 6.0 - 8.2 g/dL    Globulin 3.0 1.9 - 3.5 g/dL    A-G Ratio 1.6 g/dL   ESTIMATED GFR   Result Value Ref Range    GFR If African American >60 >60 mL/min/1.73 m 2    GFR If Non African American 60 >60 mL/min/1.73 m 2   EKG   Result Value Ref Range    Report       [facility] Emergency Dept.    Test Date:  2019  Pt Name:    ERIC SESAY                    Department: ER  MRN:        [id number]                      Room:  Gender:     Female                       Technician: [id number]  :        [id number]                   Requested By:ER TRIAGE PROTOCOL  Order #:    [id number]                    Reading MD: ANN SINGER MD    Measurements  Intervals                                Axis  Rate:       61                           P:          63  AZ:         188                          QRS:        18  QRSD:       86                           T:          31  QT:         436  QTc:        440    Interpretive Statements  Normal sinus rhythm with a rate of 61 normal corrected QT interval QRS and  axis  otherwise normal EKG    Electronically Signed On 2019 19:36:52 PDT by ANN SINGER MD        US-RU   Final Result      1.  No acute sonographic abnormality. No gallstones.   2.  Hepatic steatosis.      DX-CHEST-PORTABLE (1 VIEW)   Final Result      1.  There is no acute cardiopulmonary process.                COURSE & MEDICAL DECISION  MAKING  Pertinent Labs & Imaging studies reviewed. (See chart for details)    Patient's pain sounds suspicious for acid peptic disease.  Work-up was done she has nondetectable troponin essentially normal EKG atypical presentation I do not think further assessment is needed for cardiac possibility.  Right upper quadrant ultrasound was negative for biliary disease labs are unremarkable.    The patient has no pain at this time nonreproducible.  I am going to have her stay off the Fosamax she is instructed not to take any NSAIDs and we will increase her Prilosec to twice daily for 7 days only.  I given return precautions and follow-up      FINAL IMPRESSION  1.   1. Epigastric pain        2.   3.         Electronically signed by: Bruce Wood, 5/22/2019 7:37 PM

## 2019-05-28 ENCOUNTER — PATIENT OUTREACH (OUTPATIENT)
Dept: HEALTH INFORMATION MANAGEMENT | Facility: OTHER | Age: 74
End: 2019-05-28

## 2019-05-28 NOTE — PROGRESS NOTES
Outcome: no answer    Please transfer to [facility]  [phone] when patient returns call.     WebIZ Checked & Epic Updated:  yes     [facility] Verified: yes     Attempt # 1

## 2019-06-04 ENCOUNTER — OFFICE VISIT (OUTPATIENT)
Dept: MEDICAL GROUP | Age: 74
End: 2019-06-04
Payer: MEDICARE

## 2019-06-04 VITALS
HEIGHT: 65 IN | TEMPERATURE: 99 F | DIASTOLIC BLOOD PRESSURE: 68 MMHG | HEART RATE: 82 BPM | WEIGHT: 130 LBS | OXYGEN SATURATION: 94 % | BODY MASS INDEX: 21.66 KG/M2 | SYSTOLIC BLOOD PRESSURE: 116 MMHG

## 2019-06-04 DIAGNOSIS — K21.00 GASTROESOPHAGEAL REFLUX DISEASE WITH ESOPHAGITIS: ICD-10-CM

## 2019-06-04 PROCEDURE — 99214 OFFICE O/P EST MOD 30 MIN: CPT | Performed by: INTERNAL MEDICINE

## 2019-06-04 RX ORDER — OMEPRAZOLE 40 MG/1
40 CAPSULE, DELAYED RELEASE ORAL DAILY
Qty: 60 CAP | Refills: 4 | Status: SHIPPED | OUTPATIENT
Start: 2019-06-04 | End: 2019-11-05 | Stop reason: SDUPTHER

## 2019-06-04 ASSESSMENT — ENCOUNTER SYMPTOMS
RESPIRATORY NEGATIVE: 1
GASTROINTESTINAL NEGATIVE: 1
MUSCULOSKELETAL NEGATIVE: 1
NEUROLOGICAL NEGATIVE: 1
CONSTITUTIONAL NEGATIVE: 1
CARDIOVASCULAR NEGATIVE: 1
PSYCHIATRIC NEGATIVE: 1
EYES NEGATIVE: 1

## 2019-06-04 NOTE — PROGRESS NOTES
Subjective:      Rachele Madrigal is a 74 y.o. female who presents with Follow-Up (ER Follow up )    HPI    The patient is here for followup of chronic medical problems listed below. The patient is compliant with medications and having no side effects from them. Denies chest pain, abdominal pain, dyspnea, myalgias, or cough.    GERD  Patient was seen in office on 5/22/19 for her epigastric pain by Shahida VERA, however was sent to the ED for a higher level of treatment due to the emergent nature of symptoms. While there she had labs performed which were normal as well as a RUQ ultrasound and DX-Chest, none of which identified any abnormalities. She was suspected to have acid peptic disease and was told to stop her Fosamax and NSAIDs and increase her omeprazole to twice daily for 7 days and then return to her regular dose. She does not have any abdominal pain at this time.      Patient Active Problem List   Diagnosis   • Mixed hyperlipidemia- TG > 300   • Impaired fasting glucose   • Vitamin D deficiency disease   • Gastroesophageal reflux disease with esophagitis   • Age-related osteoporosis without current pathological fracture- dexa 2016; fosamax started       Outpatient Medications Prior to Visit   Medication Sig Dispense Refill   • fenofibrate micronized (LOFIBRA) 134 MG capsule Take 1 Cap by mouth every morning before breakfast. 90 Cap 4   • omeprazole (PRILOSEC) 40 MG delayed-release capsule Take 1 Cap by mouth every day. 90 Cap 4   • Cholecalciferol (VITAMIN D3) 5000 units Cap Take 1 Cap by mouth every day. 90 Cap 4   • naphazoline-pheniramine (OPCON-A) 0.027-0.315 % SOLN Place 1 Drop in both eyes 3 times a day as needed.       • alendronate (FOSAMAX) 70 MG Tab Take 1 Tab by mouth every 7 days. (Patient not taking: Reported on 6/4/2019) 13 Tab 4     No facility-administered medications prior to visit.         No Known Allergies    Review of Systems   Constitutional: Negative.    HENT: Negative.    Eyes:  "Negative.    Respiratory: Negative.    Cardiovascular: Negative.    Gastrointestinal: Negative.    Genitourinary: Negative.    Musculoskeletal: Negative.    Skin: Negative.    Neurological: Negative.    Endo/Heme/Allergies: Negative.    Psychiatric/Behavioral: Negative.    All other systems reviewed and are negative.       Objective:     /68 (BP Location: Left arm, Patient Position: Sitting, BP Cuff Size: Adult)   Pulse 82   Temp 37.2 °C (99 °F) (Temporal)   Ht 1.651 m (5' 5\")   Wt 59 kg (130 lb)   SpO2 94%   BMI 21.63 kg/m²     Physical Exam   Constitutional: Oriented to person, place, and time. Appears well-developed and well-nourished. No distress.   Head: Normocephalic and atraumatic.   Right Ear: External ear normal.   Left Ear: External ear normal.   Nose: Nose normal.   Mouth/Throat: Oropharynx is clear and moist. No oropharyngeal exudate.   Eyes: Pupils are equal, round, and reactive to light. Conjunctivae and EOM are normal. Right eye exhibits no discharge. Left eye exhibits no discharge. No scleral icterus.   Neck: Normal range of motion. Neck supple. No JVD present. No tracheal deviation present. No thyromegaly present.   Cardiovascular: Normal rate, regular rhythm, normal heart sounds and intact distal pulses.  Exam reveals no gallop and no friction rub.    No murmur heard.  Pulmonary/Chest: Effort normal. No stridor. No respiratory distress. No wheezing or rales. No tenderness.   Abdominal: Soft. Bowel sounds are normal. No distension and no mass. There is no tenderness. There is no rebound and no guarding. No hernia.   Musculoskeletal: Normal range of motion No edema or tenderness.   Lymphadenopathy: No cervical adenopathy.   Neurological: Alert and oriented to person, place, and time. Normal reflexes. Normal reflexes. No cranial nerve deficit. Normal muscle tone. Coordination normal.   Skin: Skin is warm and dry. No rash noted. Not diaphoretic. No erythema. No pallor.   Psychiatric: Normal " mood and affect. Behavior is normal. Judgment and thought content normal.   Nursing note and vitals reviewed.      Lab Results   Component Value Date/Time    HBA1C 6.4 (H) 01/11/2019 07:45 AM    HBA1C 5.9 12/20/2012 09:11 AM     Lab Results   Component Value Date/Time    SODIUM 141 05/22/2019 06:50 PM    POTASSIUM 3.8 05/22/2019 06:50 PM    CHLORIDE 105 05/22/2019 06:50 PM    CO2 25 05/22/2019 06:50 PM    GLUCOSE 111 (H) 05/22/2019 06:50 PM    BUN 25 (H) 05/22/2019 06:50 PM    CREATININE 0.92 05/22/2019 06:50 PM    ALKPHOSPHAT 31 05/22/2019 06:50 PM    ASTSGOT 18 05/22/2019 06:50 PM    ALTSGPT 17 05/22/2019 06:50 PM    TBILIRUBIN 0.7 05/22/2019 06:50 PM     No results found for: INR  Lab Results   Component Value Date/Time    CHOLSTRLTOT 193 01/11/2019 07:45 AM     (H) 01/11/2019 07:45 AM    HDL 42 01/11/2019 07:45 AM    TRIGLYCERIDE 124 01/11/2019 07:45 AM       No results found for: TESTOSTERONE  No results found for: TSH  Lab Results   Component Value Date/Time    FREET4 0.93 10/14/2013 08:48 AM    FREET4 0.78 06/15/2012 07:11 AM     No results found for: URICACID  No components found for: VITB12  Lab Results   Component Value Date/Time    25HYDROXY 40 12/22/2017 07:19 AM    25HYDROXY 42 06/23/2017 07:06 AM          Assessment/Plan:       1. Gastroesophageal reflux disease with esophagitis-probably aggravated by Fosamax therapy.  She will discontinue this indefinitely.  She will continue on the 40 twice daily until seen by GI for further evaluation management.  - Patient instructed to take her omeprazole 40 mg BID for the next month, and will return in one month for evaluation. She will also be referred to GI to have an endoscopy performed to further evaluate her GERD.  - REFERRAL TO GASTROENTEROLOGY  - omeprazole (PRILOSEC) 40 MG delayed-release capsule; Take 1 Cap by mouth every day.  Dispense: 60 Cap; Refill: 4      30 minute face-to-face encounter took place today.  More than half of this time was  spent in the coordination of care of the above problems, as well as counseling.     I, Sunny Sherwood (Scribe), am scribing for, and in the presence of, Doug Carpenter M.D..    Electronically signed by: Sunny Sherwood (Scribe), 6/4/2019    I, Doug Carpenter M.D., personally performed the services described in this documentation, as scribed by Sunny Sherwood in my presence, and it is both accurate and complete.

## 2019-07-03 ENCOUNTER — HOSPITAL ENCOUNTER (OUTPATIENT)
Dept: RADIOLOGY | Facility: MEDICAL CENTER | Age: 74
End: 2019-07-03
Attending: INTERNAL MEDICINE
Payer: MEDICARE

## 2019-07-03 DIAGNOSIS — Z12.31 ENCOUNTER FOR SCREENING MAMMOGRAM FOR BREAST CANCER: ICD-10-CM

## 2019-07-03 PROCEDURE — 77063 BREAST TOMOSYNTHESIS BI: CPT

## 2019-07-09 ENCOUNTER — OFFICE VISIT (OUTPATIENT)
Dept: MEDICAL GROUP | Age: 74
End: 2019-07-09
Payer: MEDICARE

## 2019-07-09 VITALS
TEMPERATURE: 98.2 F | HEART RATE: 67 BPM | BODY MASS INDEX: 21.66 KG/M2 | OXYGEN SATURATION: 95 % | WEIGHT: 130 LBS | HEIGHT: 65 IN | SYSTOLIC BLOOD PRESSURE: 102 MMHG | DIASTOLIC BLOOD PRESSURE: 66 MMHG

## 2019-07-09 DIAGNOSIS — R09.82 POST-NASAL DRIP: ICD-10-CM

## 2019-07-09 DIAGNOSIS — E78.2 MIXED HYPERLIPIDEMIA: ICD-10-CM

## 2019-07-09 DIAGNOSIS — M81.0 AGE-RELATED OSTEOPOROSIS WITHOUT CURRENT PATHOLOGICAL FRACTURE: ICD-10-CM

## 2019-07-09 DIAGNOSIS — R05.9 COUGH: ICD-10-CM

## 2019-07-09 DIAGNOSIS — E55.9 VITAMIN D DEFICIENCY DISEASE: ICD-10-CM

## 2019-07-09 DIAGNOSIS — K21.00 GASTROESOPHAGEAL REFLUX DISEASE WITH ESOPHAGITIS: ICD-10-CM

## 2019-07-09 DIAGNOSIS — R73.01 IMPAIRED FASTING GLUCOSE: ICD-10-CM

## 2019-07-09 DIAGNOSIS — K76.0 FATTY LIVER: ICD-10-CM

## 2019-07-09 DIAGNOSIS — R53.83 FATIGUE, UNSPECIFIED TYPE: ICD-10-CM

## 2019-07-09 PROCEDURE — 99214 OFFICE O/P EST MOD 30 MIN: CPT | Performed by: INTERNAL MEDICINE

## 2019-07-09 RX ORDER — FLUTICASONE PROPIONATE 50 MCG
SPRAY, SUSPENSION (ML) NASAL
Qty: 1 BOTTLE | Refills: 11 | Status: SHIPPED | OUTPATIENT
Start: 2019-07-09 | End: 2020-11-06

## 2019-07-09 ASSESSMENT — ENCOUNTER SYMPTOMS
NEUROLOGICAL NEGATIVE: 1
COUGH: 1
ABDOMINAL PAIN: 1
CARDIOVASCULAR NEGATIVE: 1
HEARTBURN: 1

## 2019-07-09 NOTE — PROGRESS NOTES
Subjective:      Rachele Madrigal is a 74 y.o. female who presents with Follow-Up and Gastrophageal Reflux        HPI    The patient is here for follow up of chronic medical problems listed below. The patient is compliant with medications and having no side effects from them. Denies chest pain, abdominal pain, dyspnea, myalgias.    She reports recently worsened GERD with abdominal pain, heartburn, cough, and fatigue. She reports compliancy with omeprazole 50 mg QD. She had US 5/22/19 which ruled out cholelithiasis or cholecystitis. She had an upper endoscopy 6/20/19 that was largely normal. We did review her blood work from 05/2019 and she is due for thyroid testing.    She's had a dry, non productive cough that is persisting for the past month. She denies any shortness of breath or wheezing but does report 4 days of subjective fever. CXR was normal on 5/22/19. She denies nasal congestion.    Patient Active Problem List   Diagnosis   • Mixed hyperlipidemia- TG > 300   • Impaired fasting glucose   • Vitamin D deficiency disease   • Gastroesophageal reflux disease with esophagitis   • Age-related osteoporosis without current pathological fracture- dexa 2016; fosamax started       Outpatient Medications Prior to Visit   Medication Sig Dispense Refill   • omeprazole (PRILOSEC) 40 MG delayed-release capsule Take 1 Cap by mouth every day. 60 Cap 4   • fenofibrate micronized (LOFIBRA) 134 MG capsule Take 1 Cap by mouth every morning before breakfast. 90 Cap 4   • Cholecalciferol (VITAMIN D3) 5000 units Cap Take 1 Cap by mouth every day. 90 Cap 4   • naphazoline-pheniramine (OPCON-A) 0.027-0.315 % SOLN Place 1 Drop in both eyes 3 times a day as needed.         No facility-administered medications prior to visit.         No Known Allergies    Review of Systems   Constitutional: Positive for malaise/fatigue.   HENT: Negative.    Respiratory: Positive for cough.    Cardiovascular: Negative.    Gastrointestinal: Positive for abdominal  pain and heartburn.   Neurological: Negative.    All other systems reviewed and are negative.           Objective:     There were no vitals taken for this visit.    Physical Exam   Constitutional: Oriented to person, place, and time. Appears well-developed and well-nourished. No distress.   Head: Normocephalic and atraumatic.   Right Ear: External ear normal.   Left Ear: External ear normal.   Nose: Nose normal.   Mouth/Throat: Oropharynx is clear and moist. No oropharyngeal exudate.   Eyes: Pupils are equal, round, and reactive to light. Conjunctivae and EOM are normal. Right eye exhibits no discharge. Left eye exhibits no discharge. No scleral icterus.   Neck: Normal range of motion. Neck supple. No JVD present. No tracheal deviation present. No thyromegaly present.   Cardiovascular: Normal rate, regular rhythm, normal heart sounds and intact distal pulses.  Exam reveals no gallop and no friction rub.    No murmur heard.  Pulmonary/Chest: Effort normal. No stridor. No respiratory distress. No wheezing or rales. No tenderness.   Abdominal: Soft. Bowel sounds are normal. No distension and no mass. There is no tenderness. There is no rebound and no guarding. No hernia.   Musculoskeletal: Normal range of motion No edema or tenderness.   Lymphadenopathy: No cervical adenopathy.   Neurological: Alert and oriented to person, place, and time. Normal reflexes. Normal reflexes. No cranial nerve deficit. Normal muscle tone. Coordination normal.   Skin: Skin is warm and dry. No rash noted. Not diaphoretic. No erythema. No pallor.   Psychiatric: Normal mood and affect. Behavior is normal. Judgment and thought content normal.   Nursing note and vitals reviewed.      Lab Results   Component Value Date/Time    HBA1C 6.4 (H) 01/11/2019 07:45 AM    HBA1C 5.9 12/20/2012 09:11 AM     Lab Results   Component Value Date/Time    SODIUM 141 05/22/2019 06:50 PM    POTASSIUM 3.8 05/22/2019 06:50 PM    CHLORIDE 105 05/22/2019 06:50 PM     CO2 25 05/22/2019 06:50 PM    GLUCOSE 111 (H) 05/22/2019 06:50 PM    BUN 25 (H) 05/22/2019 06:50 PM    CREATININE 0.92 05/22/2019 06:50 PM    ALKPHOSPHAT 31 05/22/2019 06:50 PM    ASTSGOT 18 05/22/2019 06:50 PM    ALTSGPT 17 05/22/2019 06:50 PM    TBILIRUBIN 0.7 05/22/2019 06:50 PM     No results found for: INR  Lab Results   Component Value Date/Time    CHOLSTRLTOT 193 01/11/2019 07:45 AM     (H) 01/11/2019 07:45 AM    HDL 42 01/11/2019 07:45 AM    TRIGLYCERIDE 124 01/11/2019 07:45 AM       No results found for: TESTOSTERONE  No results found for: TSH  Lab Results   Component Value Date/Time    FREET4 0.93 10/14/2013 08:48 AM    FREET4 0.78 06/15/2012 07:11 AM     No results found for: URICACID  No components found for: VITB12  Lab Results   Component Value Date/Time    25HYDROXY 40 12/22/2017 07:19 AM    25HYDROXY 42 06/23/2017 07:06 AM          Assessment/Plan:     1. Cough  - Patient reports cough for 1 month, likely due to allergies.  Patient may use Flomax as outlined below. Symptoms should resolve on their own over the next 2 months. She had negative CXR on 5/22/19.  - fluticasone (FLONASE) 50 MCG/ACT nasal spray; 2 sprays in each nostril qd  Dispense: 1 Bottle; Refill: 11  - WESTERGREN SED RATE; Future    2. Mixed hyperlipidemia- TG > 300  - Under good control. Continue same regimen. Patient counseled to exercise and diet.   - Comp Metabolic Panel; Future  - Lipid Profile; Future    3. Impaired fasting glucose  - Under good control. Continue same regimen.  diet/exercise/lose 15 lbs.; patient counseled    4. Vitamin D deficiency disease  - Under good control. Continue same regimen.     5. Gastroesophageal reflux disease with esophagitis  - Patient should continue omeprazole 40 mg QD. She should discontinue sucralfate. She had US 5/22/19 which ruled out cholelithiasis or cholecystitis. She had an upper endoscopy 6/20/19 that was largely normal.    6. Age-related osteoporosis without current pathological  fracture- dexa 2016; fosamax started  - Under good control. Continue same regimen of fosamax 70 mg every 7 days.    7. Fatigue, unspecified type  - Thought to be secondary to current GERD symptoms. Reviewed proper sleep hygiene.  - TSH; Future  - CBC WITH DIFFERENTIAL; Future    8. Post-nasal drip  - Patient reports some postnasal drip in addition to persistent cough for the past month. She may use Flonase as outlined below. CXR was negative 5/22/19.  - fluticasone (FLONASE) 50 MCG/ACT nasal spray; 2 sprays in each nostril qd  Dispense: 1 Bottle; Refill: 11    8. Gastroesophageal reflux disease with esophagitis- NEG EGD JUNE 2019  - See above (#5).    9. Fatty liver  - Plan to recheck in future with blood work.  diet/exercise/lose 15 lbs.; patient counseled    Other orders  - SUCRALFATE PO; Take  by mouth.      30 minute face-to-face encounter took place today.  More than half of this time was spent in the coordination of care of the above problems, as well as counseling.     ITawny (Kalieibe), am scribing for, and in the presence of, Doug Carpenter M.D..    Electronically signed by: Tawny Fernandez (Akshat), 7/9/2019    Doug CRUZ M.D., personally performed the services described in this documentation, as scribed by Tawny Fernandez in my presence, and it is both accurate and complete.

## 2019-10-21 ENCOUNTER — IMMUNIZATION (OUTPATIENT)
Dept: SOCIAL WORK | Facility: CLINIC | Age: 74
End: 2019-10-21
Payer: MEDICARE

## 2019-10-21 DIAGNOSIS — Z23 NEED FOR VACCINATION: ICD-10-CM

## 2019-10-21 PROCEDURE — G0008 ADMIN INFLUENZA VIRUS VAC: HCPCS | Performed by: REGISTERED NURSE

## 2019-10-21 PROCEDURE — 90662 IIV NO PRSV INCREASED AG IM: CPT | Performed by: REGISTERED NURSE

## 2019-10-21 PROCEDURE — 90670 PCV13 VACCINE IM: CPT | Performed by: REGISTERED NURSE

## 2019-10-21 PROCEDURE — G0009 ADMIN PNEUMOCOCCAL VACCINE: HCPCS | Performed by: REGISTERED NURSE

## 2019-11-01 ENCOUNTER — HOSPITAL ENCOUNTER (OUTPATIENT)
Dept: LAB | Facility: MEDICAL CENTER | Age: 74
End: 2019-11-01
Attending: INTERNAL MEDICINE
Payer: MEDICARE

## 2019-11-01 DIAGNOSIS — E78.2 MIXED HYPERLIPIDEMIA: ICD-10-CM

## 2019-11-01 DIAGNOSIS — R73.01 IMPAIRED FASTING GLUCOSE: ICD-10-CM

## 2019-11-01 DIAGNOSIS — R05.9 COUGH: ICD-10-CM

## 2019-11-01 DIAGNOSIS — R53.83 FATIGUE, UNSPECIFIED TYPE: ICD-10-CM

## 2019-11-01 LAB
ALBUMIN SERPL BCP-MCNC: 4.6 G/DL (ref 3.2–4.9)
ALBUMIN/GLOB SERPL: 1.7 G/DL
ALP SERPL-CCNC: 35 U/L (ref 30–99)
ALT SERPL-CCNC: 14 U/L (ref 2–50)
ANION GAP SERPL CALC-SCNC: 9 MMOL/L (ref 0–11.9)
AST SERPL-CCNC: 17 U/L (ref 12–45)
BASOPHILS # BLD AUTO: 0.9 % (ref 0–1.8)
BASOPHILS # BLD: 0.04 K/UL (ref 0–0.12)
BILIRUB SERPL-MCNC: 0.8 MG/DL (ref 0.1–1.5)
BUN SERPL-MCNC: 25 MG/DL (ref 8–22)
CALCIUM SERPL-MCNC: 9.2 MG/DL (ref 8.5–10.5)
CHLORIDE SERPL-SCNC: 105 MMOL/L (ref 96–112)
CHOLEST SERPL-MCNC: 183 MG/DL (ref 100–199)
CO2 SERPL-SCNC: 29 MMOL/L (ref 20–33)
CREAT SERPL-MCNC: 0.99 MG/DL (ref 0.5–1.4)
EOSINOPHIL # BLD AUTO: 0.06 K/UL (ref 0–0.51)
EOSINOPHIL NFR BLD: 1.3 % (ref 0–6.9)
ERYTHROCYTE [DISTWIDTH] IN BLOOD BY AUTOMATED COUNT: 45.6 FL (ref 35.9–50)
ERYTHROCYTE [SEDIMENTATION RATE] IN BLOOD BY WESTERGREN METHOD: 6 MM/HOUR (ref 0–30)
EST. AVERAGE GLUCOSE BLD GHB EST-MCNC: 128 MG/DL
GLOBULIN SER CALC-MCNC: 2.7 G/DL (ref 1.9–3.5)
GLUCOSE SERPL-MCNC: 97 MG/DL (ref 65–99)
HBA1C MFR BLD: 6.1 % (ref 0–5.6)
HCT VFR BLD AUTO: 42.7 % (ref 37–47)
HDLC SERPL-MCNC: 48 MG/DL
HGB BLD-MCNC: 13.2 G/DL (ref 12–16)
IMM GRANULOCYTES # BLD AUTO: 0.02 K/UL (ref 0–0.11)
IMM GRANULOCYTES NFR BLD AUTO: 0.4 % (ref 0–0.9)
LDLC SERPL CALC-MCNC: 112 MG/DL
LYMPHOCYTES # BLD AUTO: 1.74 K/UL (ref 1–4.8)
LYMPHOCYTES NFR BLD: 39 % (ref 22–41)
MCH RBC QN AUTO: 30.8 PG (ref 27–33)
MCHC RBC AUTO-ENTMCNC: 30.9 G/DL (ref 33.6–35)
MCV RBC AUTO: 99.8 FL (ref 81.4–97.8)
MONOCYTES # BLD AUTO: 0.29 K/UL (ref 0–0.85)
MONOCYTES NFR BLD AUTO: 6.5 % (ref 0–13.4)
NEUTROPHILS # BLD AUTO: 2.31 K/UL (ref 2–7.15)
NEUTROPHILS NFR BLD: 51.9 % (ref 44–72)
NRBC # BLD AUTO: 0 K/UL
NRBC BLD-RTO: 0 /100 WBC
PLATELET # BLD AUTO: 292 K/UL (ref 164–446)
PMV BLD AUTO: 11 FL (ref 9–12.9)
POTASSIUM SERPL-SCNC: 3.9 MMOL/L (ref 3.6–5.5)
PROT SERPL-MCNC: 7.3 G/DL (ref 6–8.2)
RBC # BLD AUTO: 4.28 M/UL (ref 4.2–5.4)
SODIUM SERPL-SCNC: 143 MMOL/L (ref 135–145)
TRIGL SERPL-MCNC: 117 MG/DL (ref 0–149)
TSH SERPL DL<=0.005 MIU/L-ACNC: 1.5 UIU/ML (ref 0.38–5.33)
WBC # BLD AUTO: 4.5 K/UL (ref 4.8–10.8)

## 2019-11-01 PROCEDURE — 85652 RBC SED RATE AUTOMATED: CPT

## 2019-11-01 PROCEDURE — 80053 COMPREHEN METABOLIC PANEL: CPT

## 2019-11-01 PROCEDURE — 83036 HEMOGLOBIN GLYCOSYLATED A1C: CPT

## 2019-11-01 PROCEDURE — 36415 COLL VENOUS BLD VENIPUNCTURE: CPT

## 2019-11-01 PROCEDURE — 84443 ASSAY THYROID STIM HORMONE: CPT

## 2019-11-01 PROCEDURE — 85025 COMPLETE CBC W/AUTO DIFF WBC: CPT

## 2019-11-01 PROCEDURE — 80061 LIPID PANEL: CPT

## 2019-11-05 ENCOUNTER — OFFICE VISIT (OUTPATIENT)
Dept: MEDICAL GROUP | Age: 74
End: 2019-11-05
Payer: MEDICARE

## 2019-11-05 VITALS
HEART RATE: 63 BPM | TEMPERATURE: 97.6 F | BODY MASS INDEX: 20.89 KG/M2 | SYSTOLIC BLOOD PRESSURE: 114 MMHG | DIASTOLIC BLOOD PRESSURE: 72 MMHG | HEIGHT: 65 IN | OXYGEN SATURATION: 96 % | WEIGHT: 125.4 LBS

## 2019-11-05 DIAGNOSIS — R73.01 IMPAIRED FASTING GLUCOSE: ICD-10-CM

## 2019-11-05 DIAGNOSIS — E55.9 VITAMIN D DEFICIENCY DISEASE: ICD-10-CM

## 2019-11-05 DIAGNOSIS — K21.00 GASTROESOPHAGEAL REFLUX DISEASE WITH ESOPHAGITIS: ICD-10-CM

## 2019-11-05 DIAGNOSIS — E78.2 MIXED HYPERLIPIDEMIA: ICD-10-CM

## 2019-11-05 PROCEDURE — 99214 OFFICE O/P EST MOD 30 MIN: CPT | Performed by: INTERNAL MEDICINE

## 2019-11-05 RX ORDER — OMEPRAZOLE 40 MG/1
40 CAPSULE, DELAYED RELEASE ORAL DAILY
Qty: 100 CAP | Refills: 4 | Status: SHIPPED | OUTPATIENT
Start: 2019-11-05 | End: 2020-11-10

## 2019-11-05 RX ORDER — FENOFIBRATE 134 MG/1
134 CAPSULE ORAL
Qty: 90 CAP | Refills: 4 | Status: SHIPPED | OUTPATIENT
Start: 2019-11-05 | End: 2020-11-11

## 2019-11-05 ASSESSMENT — ENCOUNTER SYMPTOMS
GASTROINTESTINAL NEGATIVE: 1
CONSTITUTIONAL NEGATIVE: 1
EYES NEGATIVE: 1
CARDIOVASCULAR NEGATIVE: 1
MUSCULOSKELETAL NEGATIVE: 1
RESPIRATORY NEGATIVE: 1

## 2019-11-05 NOTE — PROGRESS NOTES
"Subjective:      Rachele Madrigal is a 74 y.o. female who presents with Hyperlipidemia (lab review)        HPI    The patient is here for followup of chronic medical problems listed below. The patient is compliant with medications and having no side effects from them. Denies chest pain, abdominal pain, dyspnea, myalgias, or cough.    Patient Active Problem List   Diagnosis   • Mixed hyperlipidemia- TG > 300   • Impaired fasting glucose   • Vitamin D deficiency disease   • Gastroesophageal reflux disease with esophagitis- NEG EGD JUNE 2019   • Age-related osteoporosis without current pathological fracture- dexa 2016; fosamax started   • Fatty liver       Outpatient Medications Prior to Visit   Medication Sig Dispense Refill   • naphazoline-pheniramine (OPCON-A) 0.027-0.315 % SOLN Place 1 Drop in both eyes 3 times a day as needed.       • fluticasone (FLONASE) 50 MCG/ACT nasal spray 2 sprays in each nostril qd (Patient not taking: Reported on 11/5/2019) 1 Bottle 11   • omeprazole (PRILOSEC) 40 MG delayed-release capsule Take 1 Cap by mouth every day. 60 Cap 4   • fenofibrate micronized (LOFIBRA) 134 MG capsule Take 1 Cap by mouth every morning before breakfast. 90 Cap 4   • Cholecalciferol (VITAMIN D3) 5000 units Cap Take 1 Cap by mouth every day. 90 Cap 4     No facility-administered medications prior to visit.         No Known Allergies    Review of Systems   Constitutional: Negative.    HENT: Negative.    Eyes: Negative.    Respiratory: Negative.    Cardiovascular: Negative.    Gastrointestinal: Negative.    Genitourinary: Negative.    Musculoskeletal: Negative.    Skin: Negative.    All other systems reviewed and are negative.       Objective:     /72 (BP Location: Left arm, Patient Position: Sitting, BP Cuff Size: Adult)   Pulse 63   Temp 36.4 °C (97.6 °F) (Temporal)   Ht 1.651 m (5' 5\")   Wt 56.9 kg (125 lb 6.4 oz)   LMP  (LMP Unknown)   SpO2 96%   Breastfeeding? No   BMI 20.87 kg/m²     Physical Exam "   Constitutional: Oriented to person, place, and time. Appears well-developed and well-nourished. No distress.   Head: Normocephalic and atraumatic.   Right Ear: External ear normal.   Left Ear: External ear normal.   Nose: Nose normal.   Mouth/Throat: Oropharynx is clear and moist. No oropharyngeal exudate.   Eyes: Pupils are equal, round, and reactive to light. Conjunctivae and EOM are normal. Right eye exhibits no discharge. Left eye exhibits no discharge. No scleral icterus.   Neck: Normal range of motion. Neck supple. No JVD present. No tracheal deviation present. No thyromegaly present.   Cardiovascular: Normal rate, regular rhythm, normal heart sounds and intact distal pulses.  Exam reveals no gallop and no friction rub.    No murmur heard.  Pulmonary/Chest: Effort normal. No stridor. No respiratory distress. No wheezing or rales. No tenderness.   Abdominal: Soft. Bowel sounds are normal. No distension and no mass. There is no tenderness. There is no rebound and no guarding. No hernia.   Musculoskeletal: Normal range of motion No edema or tenderness.   Lymphadenopathy: No cervical adenopathy.   Neurological: Alert and oriented to person, place, and time. Normal reflexes. Normal reflexes. No cranial nerve deficit. Normal muscle tone. Coordination normal.   Skin: Skin is warm and dry. No rash noted. Not diaphoretic. No erythema. No pallor.   Psychiatric: Normal mood and affect. Behavior is normal. Judgment and thought content normal.   Nursing note and vitals reviewed.      Lab Results   Component Value Date/Time    HBA1C 6.1 (H) 11/01/2019 07:45 AM    HBA1C 6.4 (H) 01/11/2019 07:45 AM     Lab Results   Component Value Date/Time    SODIUM 143 11/01/2019 07:45 AM    POTASSIUM 3.9 11/01/2019 07:45 AM    CHLORIDE 105 11/01/2019 07:45 AM    CO2 29 11/01/2019 07:45 AM    GLUCOSE 97 11/01/2019 07:45 AM    BUN 25 (H) 11/01/2019 07:45 AM    CREATININE 0.99 11/01/2019 07:45 AM    ALKPHOSPHAT 35 11/01/2019 07:45 AM     ASTSGOT 17 11/01/2019 07:45 AM    ALTSGPT 14 11/01/2019 07:45 AM    TBILIRUBIN 0.8 11/01/2019 07:45 AM     No results found for: INR  Lab Results   Component Value Date/Time    CHOLSTRLTOT 183 11/01/2019 07:45 AM     (H) 11/01/2019 07:45 AM    HDL 48 11/01/2019 07:45 AM    TRIGLYCERIDE 117 11/01/2019 07:45 AM       No results found for: TESTOSTERONE  No results found for: TSH  Lab Results   Component Value Date/Time    FREET4 0.93 10/14/2013 08:48 AM    FREET4 0.78 06/15/2012 07:11 AM     No results found for: URICACID  No components found for: VITB12  Lab Results   Component Value Date/Time    25HYDROXY 40 12/22/2017 07:19 AM    25HYDROXY 42 06/23/2017 07:06 AM          Assessment/Plan:     1. Gastroesophageal reflux disease with esophagitis  Under good control. Continue same regimen of Omeprazole 40mg daily.    - omeprazole (PRILOSEC) 40 MG delayed-release capsule; Take 1 Cap by mouth every day.  Dispense: 100 Cap; Refill: 4    2. Mixed hyperlipidemia  Lipid panel completed on 11/1/19. Cholesterol 183; Triglycerides 117; HDL 48; . Under good control. Continue same regimen of Fenofibrate 134mg. Plan to evaluate with:    - fenofibrate micronized (LOFIBRA) 134 MG capsule; Take 1 Cap by mouth every morning before breakfast.  Dispense: 90 Cap; Refill: 4  - Comp Metabolic Panel; Future  - Lipid Profile; Future  - CBC WITH DIFFERENTIAL; Future    3. Vitamin D deficiency disease  Vitamin D on 12/22/17 was 40. Under good control. Continue same regimen of Vitamin D supplementation. Plan to evaluate with:    - Cholecalciferol (VITAMIN D3) 5000 units Cap; Take 1 Cap by mouth every day.  Dispense: 100 Cap; Refill: 4  - VITAMIN D,25 HYDROXY; Future    4. Impaired fasting glucose  Fasting glucose on 11/1/19 was 97. Under good control. Continue same regimen of high protein low carb diet. Plan to evaluate with:    - HEMOGLOBIN A1C; Future       30 minute face-to-face encounter took place today.  More than half of  this time was spent in the coordination of care of the above problems, as well as counseling.     I, Sam London (Kalieibbartolo), am scribing for, and in the presence of, Doug Carpenter M.D..    Electronically signed by: Sam London (Kalieibbartolo), 11/5/2019    I, Doug Carpenter M.D., personally performed the services described in this documentation, as scribed by Sam London in my presence, and it is both accurate and complete.

## 2019-11-12 NOTE — PROGRESS NOTES
spouse also on [employer] [facility] list- pt aware I am her SCP PA. She received letter. There was a language barrier and I adv that we can also use the  services

## 2020-09-22 ENCOUNTER — PATIENT OUTREACH (OUTPATIENT)
Dept: HEALTH INFORMATION MANAGEMENT | Facility: OTHER | Age: 75
End: 2020-09-22

## 2020-09-22 NOTE — PROGRESS NOTES
Outcome: Left voicemail/ message    Please transfer to [facility]  [phone] when patient returns call.     [facility] Verified: yes     Attempt # 1

## 2020-09-30 ENCOUNTER — IMMUNIZATION (OUTPATIENT)
Dept: SOCIAL WORK | Facility: CLINIC | Age: 75
End: 2020-09-30
Payer: MEDICARE

## 2020-09-30 DIAGNOSIS — Z23 NEED FOR VACCINATION: ICD-10-CM

## 2020-09-30 PROCEDURE — 90662 IIV NO PRSV INCREASED AG IM: CPT | Performed by: REGISTERED NURSE

## 2020-09-30 PROCEDURE — G0008 ADMIN INFLUENZA VIRUS VAC: HCPCS | Performed by: REGISTERED NURSE

## 2020-10-28 ENCOUNTER — PATIENT MESSAGE (OUTPATIENT)
Dept: HEALTH INFORMATION MANAGEMENT | Facility: OTHER | Age: 75
End: 2020-10-28

## 2020-10-28 NOTE — PROGRESS NOTES
Member called to inquire about her labs. Three forms of HIPAA verified and confirmed labs will  20 and scheduled annual physical.

## 2020-10-31 ENCOUNTER — HOSPITAL ENCOUNTER (OUTPATIENT)
Dept: LAB | Facility: MEDICAL CENTER | Age: 75
End: 2020-10-31
Attending: INTERNAL MEDICINE
Payer: MEDICARE

## 2020-10-31 DIAGNOSIS — E55.9 VITAMIN D DEFICIENCY DISEASE: ICD-10-CM

## 2020-10-31 DIAGNOSIS — R73.01 IMPAIRED FASTING GLUCOSE: ICD-10-CM

## 2020-10-31 DIAGNOSIS — E78.2 MIXED HYPERLIPIDEMIA: ICD-10-CM

## 2020-10-31 LAB
25(OH)D3 SERPL-MCNC: 50 NG/ML (ref 30–100)
ALBUMIN SERPL BCP-MCNC: 4.8 G/DL (ref 3.2–4.9)
ALBUMIN/GLOB SERPL: 1.7 G/DL
ALP SERPL-CCNC: 44 U/L (ref 30–99)
ALT SERPL-CCNC: 13 U/L (ref 2–50)
ANION GAP SERPL CALC-SCNC: 11 MMOL/L (ref 7–16)
AST SERPL-CCNC: 18 U/L (ref 12–45)
BASOPHILS # BLD AUTO: 0.7 % (ref 0–1.8)
BASOPHILS # BLD: 0.03 K/UL (ref 0–0.12)
BILIRUB SERPL-MCNC: 0.7 MG/DL (ref 0.1–1.5)
BUN SERPL-MCNC: 23 MG/DL (ref 8–22)
CALCIUM SERPL-MCNC: 9.7 MG/DL (ref 8.5–10.5)
CHLORIDE SERPL-SCNC: 104 MMOL/L (ref 96–112)
CHOLEST SERPL-MCNC: 203 MG/DL (ref 100–199)
CO2 SERPL-SCNC: 27 MMOL/L (ref 20–33)
CREAT SERPL-MCNC: 0.93 MG/DL (ref 0.5–1.4)
EOSINOPHIL # BLD AUTO: 0.06 K/UL (ref 0–0.51)
EOSINOPHIL NFR BLD: 1.3 % (ref 0–6.9)
ERYTHROCYTE [DISTWIDTH] IN BLOOD BY AUTOMATED COUNT: 43.6 FL (ref 35.9–50)
EST. AVERAGE GLUCOSE BLD GHB EST-MCNC: 134 MG/DL
FASTING STATUS PATIENT QL REPORTED: NORMAL
GLOBULIN SER CALC-MCNC: 2.9 G/DL (ref 1.9–3.5)
GLUCOSE SERPL-MCNC: 102 MG/DL (ref 65–99)
HBA1C MFR BLD: 6.3 % (ref 0–5.6)
HCT VFR BLD AUTO: 43.6 % (ref 37–47)
HDLC SERPL-MCNC: 52 MG/DL
HGB BLD-MCNC: 13.6 G/DL (ref 12–16)
IMM GRANULOCYTES # BLD AUTO: 0.01 K/UL (ref 0–0.11)
IMM GRANULOCYTES NFR BLD AUTO: 0.2 % (ref 0–0.9)
LDLC SERPL CALC-MCNC: 129 MG/DL
LYMPHOCYTES # BLD AUTO: 1.52 K/UL (ref 1–4.8)
LYMPHOCYTES NFR BLD: 33 % (ref 22–41)
MCH RBC QN AUTO: 30.8 PG (ref 27–33)
MCHC RBC AUTO-ENTMCNC: 31.2 G/DL (ref 33.6–35)
MCV RBC AUTO: 98.6 FL (ref 81.4–97.8)
MONOCYTES # BLD AUTO: 0.28 K/UL (ref 0–0.85)
MONOCYTES NFR BLD AUTO: 6.1 % (ref 0–13.4)
NEUTROPHILS # BLD AUTO: 2.71 K/UL (ref 2–7.15)
NEUTROPHILS NFR BLD: 58.7 % (ref 44–72)
NRBC # BLD AUTO: 0 K/UL
NRBC BLD-RTO: 0 /100 WBC
PLATELET # BLD AUTO: 277 K/UL (ref 164–446)
PMV BLD AUTO: 11 FL (ref 9–12.9)
POTASSIUM SERPL-SCNC: 4.4 MMOL/L (ref 3.6–5.5)
PROT SERPL-MCNC: 7.7 G/DL (ref 6–8.2)
RBC # BLD AUTO: 4.42 M/UL (ref 4.2–5.4)
SODIUM SERPL-SCNC: 142 MMOL/L (ref 135–145)
TRIGL SERPL-MCNC: 111 MG/DL (ref 0–149)
WBC # BLD AUTO: 4.6 K/UL (ref 4.8–10.8)

## 2020-10-31 PROCEDURE — 85025 COMPLETE CBC W/AUTO DIFF WBC: CPT

## 2020-10-31 PROCEDURE — 36415 COLL VENOUS BLD VENIPUNCTURE: CPT

## 2020-10-31 PROCEDURE — 83036 HEMOGLOBIN GLYCOSYLATED A1C: CPT

## 2020-10-31 PROCEDURE — 82306 VITAMIN D 25 HYDROXY: CPT

## 2020-10-31 PROCEDURE — 80053 COMPREHEN METABOLIC PANEL: CPT

## 2020-10-31 PROCEDURE — 80061 LIPID PANEL: CPT

## 2020-11-06 ENCOUNTER — OFFICE VISIT (OUTPATIENT)
Dept: MEDICAL GROUP | Age: 75
End: 2020-11-06
Payer: MEDICARE

## 2020-11-06 VITALS
WEIGHT: 129 LBS | HEART RATE: 69 BPM | SYSTOLIC BLOOD PRESSURE: 124 MMHG | TEMPERATURE: 99.5 F | OXYGEN SATURATION: 92 % | DIASTOLIC BLOOD PRESSURE: 80 MMHG | BODY MASS INDEX: 21.49 KG/M2 | HEIGHT: 65 IN

## 2020-11-06 DIAGNOSIS — K21.00 GASTROESOPHAGEAL REFLUX DISEASE WITH ESOPHAGITIS WITHOUT HEMORRHAGE: ICD-10-CM

## 2020-11-06 DIAGNOSIS — E55.9 VITAMIN D DEFICIENCY: ICD-10-CM

## 2020-11-06 DIAGNOSIS — E53.8 VITAMIN B 12 DEFICIENCY: ICD-10-CM

## 2020-11-06 DIAGNOSIS — M81.0 AGE-RELATED OSTEOPOROSIS WITHOUT CURRENT PATHOLOGICAL FRACTURE: ICD-10-CM

## 2020-11-06 DIAGNOSIS — Z23 NEED FOR VACCINATION: ICD-10-CM

## 2020-11-06 DIAGNOSIS — R73.01 IMPAIRED FASTING GLUCOSE: ICD-10-CM

## 2020-11-06 DIAGNOSIS — E78.2 MIXED HYPERLIPIDEMIA: ICD-10-CM

## 2020-11-06 DIAGNOSIS — Z11.59 NEED FOR HEPATITIS C SCREENING TEST: ICD-10-CM

## 2020-11-06 PROCEDURE — 99214 OFFICE O/P EST MOD 30 MIN: CPT | Performed by: INTERNAL MEDICINE

## 2020-11-06 ASSESSMENT — ENCOUNTER SYMPTOMS
GASTROINTESTINAL NEGATIVE: 1
RESPIRATORY NEGATIVE: 1
GENERAL WELL-BEING: GOOD
MUSCULOSKELETAL NEGATIVE: 1
CONSTITUTIONAL NEGATIVE: 1
PSYCHIATRIC NEGATIVE: 1
EYES NEGATIVE: 1
NEUROLOGICAL NEGATIVE: 1
CARDIOVASCULAR NEGATIVE: 1

## 2020-11-06 ASSESSMENT — PATIENT HEALTH QUESTIONNAIRE - PHQ9: CLINICAL INTERPRETATION OF PHQ2 SCORE: 0

## 2020-11-06 ASSESSMENT — ACTIVITIES OF DAILY LIVING (ADL): BATHING_REQUIRES_ASSISTANCE: 0

## 2020-11-06 ASSESSMENT — FIBROSIS 4 INDEX: FIB4 SCORE: 1.33

## 2020-11-06 NOTE — PROGRESS NOTES
Subjective:      Kandy Madrigal is a 75 y.o. female who presents with Annual Exam (GERD) and Lab Results  The patient is here for followup of chronic medical problems listed below. The patient is compliant with medications and having no side effects from them. Denies chest pain, abdominal pain, dyspnea, myalgias, or cough.   Patient Active Problem List    Diagnosis Date Noted   • Fatty liver 07/09/2019   • Age-related osteoporosis without current pathological fracture- dexa 2016; fosamax started 12/28/2016   • Gastroesophageal reflux disease with esophagitis- NEG EGD JUNE 2019 11/05/2014   • Vitamin D deficiency disease 10/21/2013   • Impaired fasting glucose 09/28/2012   • Mixed hyperlipidemia- TG > 300 06/14/2012     No Known Allergies  Outpatient Medications Prior to Visit   Medication Sig Dispense Refill   • omeprazole (PRILOSEC) 40 MG delayed-release capsule Take 1 Cap by mouth every day. 100 Cap 4   • fenofibrate micronized (LOFIBRA) 134 MG capsule Take 1 Cap by mouth every morning before breakfast. 90 Cap 4   • Cholecalciferol (VITAMIN D3) 5000 units Cap Take 1 Cap by mouth every day. 100 Cap 4   • naphazoline-pheniramine (OPCON-A) 0.027-0.315 % SOLN Place 1 Drop in both eyes 3 times a day as needed.       • fluticasone (FLONASE) 50 MCG/ACT nasal spray 2 sprays in each nostril qd (Patient not taking: Reported on 11/5/2019) 1 Bottle 11     No facility-administered medications prior to visit.                HPI    Review of Systems   Constitutional: Negative.    HENT: Negative.    Eyes: Negative.    Respiratory: Negative.    Cardiovascular: Negative.    Gastrointestinal: Negative.    Genitourinary: Negative.    Musculoskeletal: Negative.    Skin: Negative.    Neurological: Negative.    Endo/Heme/Allergies: Negative.    Psychiatric/Behavioral: Negative.           Objective:     /80 (BP Location: Right arm, Patient Position: Sitting, BP Cuff Size: Adult)   Pulse 69   Temp 37.5 °C (99.5 °F) (Temporal)   Ht  "1.651 m (5' 5\")   Wt 58.5 kg (129 lb)   LMP  (LMP Unknown)   SpO2 92%   BMI 21.47 kg/m²      Physical Exam  Vitals signs reviewed.   Constitutional:       General: She is not in acute distress.     Appearance: She is well-developed. She is not diaphoretic.   HENT:      Head: Normocephalic and atraumatic.      Right Ear: External ear normal.      Left Ear: External ear normal.      Nose: Nose normal.      Mouth/Throat:      Pharynx: No oropharyngeal exudate.   Eyes:      General: No scleral icterus.        Right eye: No discharge.         Left eye: No discharge.      Conjunctiva/sclera: Conjunctivae normal.      Pupils: Pupils are equal, round, and reactive to light.   Neck:      Musculoskeletal: Normal range of motion and neck supple.      Thyroid: No thyromegaly.      Vascular: No JVD.      Trachea: No tracheal deviation.   Cardiovascular:      Rate and Rhythm: Normal rate and regular rhythm.      Heart sounds: Normal heart sounds. No murmur. No friction rub. No gallop.    Pulmonary:      Effort: Pulmonary effort is normal. No respiratory distress.      Breath sounds: Normal breath sounds. No stridor. No wheezing or rales.   Chest:      Chest wall: No tenderness.   Abdominal:      General: Bowel sounds are normal. There is no distension.      Palpations: Abdomen is soft. There is no mass.      Tenderness: There is no abdominal tenderness. There is no guarding or rebound.   Musculoskeletal: Normal range of motion.         General: No tenderness.   Lymphadenopathy:      Cervical: No cervical adenopathy.   Skin:     General: Skin is warm and dry.      Coloration: Skin is not pale.      Findings: No erythema or rash.   Neurological:      Mental Status: She is alert and oriented to person, place, and time.      Cranial Nerves: No cranial nerve deficit.      Motor: No abnormal muscle tone.      Coordination: Coordination normal.      Deep Tendon Reflexes: Reflexes are normal and symmetric. Reflexes normal. "   Psychiatric:         Behavior: Behavior normal.         Thought Content: Thought content normal.         Judgment: Judgment normal.                 Assessment/Plan:         1. Need for hepatitis C screening test      - HCV Scrn ( 4121-1830 1xLife); Future    2. Need for vaccination     - Zoster Vac Recomb Adjuvanted (SHINGRIX) 50 MCG/0.5ML Recon Susp; 0.5 mL by Intramuscular route Once for 1 dose.  Dispense: 0.5 mL; Refill: 0    3. Mixed hyperlipidemia- TG > 300    Under good control. Continue same regimen.     - TSH; Future  - Comp Metabolic Panel; Future  - Lipid Profile; Future  - CBC WITH DIFFERENTIAL; Future    4. Impaired fasting glucose    Under good control. Continue same regimen.     - HEMOGLOBIN A1C; Future    5. Vitamin D deficiency disease    Under good control. Continue same regimen.     - VITAMIN D,25 HYDROXY; Future    6. Gastroesophageal reflux disease with esophagitis without hemorrhage       Under good control. Continue same regimen.    7. Age-related osteoporosis without current pathological fracture- dexa ; fosamax started       Under good control. Continue same regimen.    8. Vitamin B 12 deficiency      Under good control. Continue same regimen.    - VITAMIN B12; Future  - FOLATE; Future

## 2020-11-11 DIAGNOSIS — R09.82 POST-NASAL DRIP: ICD-10-CM

## 2020-11-11 DIAGNOSIS — E78.2 MIXED HYPERLIPIDEMIA: ICD-10-CM

## 2020-11-11 DIAGNOSIS — R05.9 COUGH: ICD-10-CM

## 2020-11-11 RX ORDER — FENOFIBRATE 134 MG/1
CAPSULE ORAL
Qty: 90 CAP | Refills: 4 | Status: SHIPPED | OUTPATIENT
Start: 2020-11-11 | End: 2021-11-30

## 2020-11-11 RX ORDER — FLUTICASONE PROPIONATE 50 MCG
SPRAY, SUSPENSION (ML) NASAL
Qty: 16 G | Refills: 11 | Status: SHIPPED | OUTPATIENT
Start: 2020-11-11 | End: 2021-11-16

## 2021-01-11 DIAGNOSIS — Z23 NEED FOR VACCINATION: ICD-10-CM

## 2021-02-17 ENCOUNTER — IMMUNIZATION (OUTPATIENT)
Dept: FAMILY PLANNING/WOMEN'S HEALTH CLINIC | Facility: IMMUNIZATION CENTER | Age: 76
End: 2021-02-17
Attending: INTERNAL MEDICINE
Payer: MEDICARE

## 2021-02-17 DIAGNOSIS — Z23 ENCOUNTER FOR VACCINATION: Primary | ICD-10-CM

## 2021-02-17 DIAGNOSIS — Z23 NEED FOR VACCINATION: ICD-10-CM

## 2021-02-17 PROCEDURE — 0001A PFIZER SARS-COV-2 VACCINE: CPT

## 2021-02-17 PROCEDURE — 91300 PFIZER SARS-COV-2 VACCINE: CPT

## 2021-03-13 ENCOUNTER — IMMUNIZATION (OUTPATIENT)
Dept: FAMILY PLANNING/WOMEN'S HEALTH CLINIC | Facility: IMMUNIZATION CENTER | Age: 76
End: 2021-03-13
Attending: INTERNAL MEDICINE
Payer: MEDICARE

## 2021-03-13 DIAGNOSIS — Z23 ENCOUNTER FOR VACCINATION: Primary | ICD-10-CM

## 2021-03-13 PROCEDURE — 0002A PFIZER SARS-COV-2 VACCINE: CPT

## 2021-03-13 PROCEDURE — 91300 PFIZER SARS-COV-2 VACCINE: CPT

## 2021-08-22 ENCOUNTER — PATIENT MESSAGE (OUTPATIENT)
Dept: HEALTH INFORMATION MANAGEMENT | Facility: OTHER | Age: 76
End: 2021-08-22

## 2021-10-04 ENCOUNTER — HOSPITAL ENCOUNTER (OUTPATIENT)
Dept: LAB | Facility: MEDICAL CENTER | Age: 76
End: 2021-10-04
Attending: INTERNAL MEDICINE
Payer: MEDICARE

## 2021-10-04 DIAGNOSIS — R73.01 IMPAIRED FASTING GLUCOSE: ICD-10-CM

## 2021-10-04 DIAGNOSIS — Z11.59 NEED FOR HEPATITIS C SCREENING TEST: ICD-10-CM

## 2021-10-04 DIAGNOSIS — E55.9 VITAMIN D DEFICIENCY: ICD-10-CM

## 2021-10-04 DIAGNOSIS — E53.8 VITAMIN B 12 DEFICIENCY: ICD-10-CM

## 2021-10-04 DIAGNOSIS — E78.2 MIXED HYPERLIPIDEMIA: ICD-10-CM

## 2021-10-04 LAB
25(OH)D3 SERPL-MCNC: 77 NG/ML (ref 30–100)
ALBUMIN SERPL BCP-MCNC: 4.9 G/DL (ref 3.2–4.9)
ALBUMIN/GLOB SERPL: 1.8 G/DL
ALP SERPL-CCNC: 64 U/L (ref 30–99)
ALT SERPL-CCNC: 15 U/L (ref 2–50)
ANION GAP SERPL CALC-SCNC: 11 MMOL/L (ref 7–16)
AST SERPL-CCNC: 16 U/L (ref 12–45)
BASOPHILS # BLD AUTO: 0.7 % (ref 0–1.8)
BASOPHILS # BLD: 0.03 K/UL (ref 0–0.12)
BILIRUB SERPL-MCNC: 0.8 MG/DL (ref 0.1–1.5)
BUN SERPL-MCNC: 23 MG/DL (ref 8–22)
CALCIUM SERPL-MCNC: 9.7 MG/DL (ref 8.5–10.5)
CHLORIDE SERPL-SCNC: 105 MMOL/L (ref 96–112)
CHOLEST SERPL-MCNC: 217 MG/DL (ref 100–199)
CO2 SERPL-SCNC: 26 MMOL/L (ref 20–33)
CREAT SERPL-MCNC: 0.81 MG/DL (ref 0.5–1.4)
EOSINOPHIL # BLD AUTO: 0.06 K/UL (ref 0–0.51)
EOSINOPHIL NFR BLD: 1.4 % (ref 0–6.9)
ERYTHROCYTE [DISTWIDTH] IN BLOOD BY AUTOMATED COUNT: 41.8 FL (ref 35.9–50)
EST. AVERAGE GLUCOSE BLD GHB EST-MCNC: 128 MG/DL
FASTING STATUS PATIENT QL REPORTED: NORMAL
FOLATE SERPL-MCNC: 15.4 NG/ML
GLOBULIN SER CALC-MCNC: 2.7 G/DL (ref 1.9–3.5)
GLUCOSE SERPL-MCNC: 112 MG/DL (ref 65–99)
HBA1C MFR BLD: 6.1 % (ref 4–5.6)
HCT VFR BLD AUTO: 42.1 % (ref 37–47)
HCV AB SER QL: NORMAL
HDLC SERPL-MCNC: 47 MG/DL
HGB BLD-MCNC: 14 G/DL (ref 12–16)
IMM GRANULOCYTES # BLD AUTO: 0.01 K/UL (ref 0–0.11)
IMM GRANULOCYTES NFR BLD AUTO: 0.2 % (ref 0–0.9)
LDLC SERPL CALC-MCNC: 141 MG/DL
LYMPHOCYTES # BLD AUTO: 1.65 K/UL (ref 1–4.8)
LYMPHOCYTES NFR BLD: 37.9 % (ref 22–41)
MCH RBC QN AUTO: 31.9 PG (ref 27–33)
MCHC RBC AUTO-ENTMCNC: 33.3 G/DL (ref 33.6–35)
MCV RBC AUTO: 95.9 FL (ref 81.4–97.8)
MONOCYTES # BLD AUTO: 0.23 K/UL (ref 0–0.85)
MONOCYTES NFR BLD AUTO: 5.3 % (ref 0–13.4)
NEUTROPHILS # BLD AUTO: 2.37 K/UL (ref 2–7.15)
NEUTROPHILS NFR BLD: 54.5 % (ref 44–72)
NRBC # BLD AUTO: 0 K/UL
NRBC BLD-RTO: 0 /100 WBC
PLATELET # BLD AUTO: 243 K/UL (ref 164–446)
PMV BLD AUTO: 11.2 FL (ref 9–12.9)
POTASSIUM SERPL-SCNC: 4 MMOL/L (ref 3.6–5.5)
PROT SERPL-MCNC: 7.6 G/DL (ref 6–8.2)
RBC # BLD AUTO: 4.39 M/UL (ref 4.2–5.4)
SODIUM SERPL-SCNC: 142 MMOL/L (ref 135–145)
TRIGL SERPL-MCNC: 144 MG/DL (ref 0–149)
TSH SERPL DL<=0.005 MIU/L-ACNC: 1.42 UIU/ML (ref 0.38–5.33)
VIT B12 SERPL-MCNC: 1410 PG/ML (ref 211–911)
WBC # BLD AUTO: 4.4 K/UL (ref 4.8–10.8)

## 2021-10-04 PROCEDURE — 82607 VITAMIN B-12: CPT

## 2021-10-04 PROCEDURE — 82306 VITAMIN D 25 HYDROXY: CPT

## 2021-10-04 PROCEDURE — 36415 COLL VENOUS BLD VENIPUNCTURE: CPT

## 2021-10-04 PROCEDURE — 83036 HEMOGLOBIN GLYCOSYLATED A1C: CPT

## 2021-10-04 PROCEDURE — 85025 COMPLETE CBC W/AUTO DIFF WBC: CPT

## 2021-10-04 PROCEDURE — 82746 ASSAY OF FOLIC ACID SERUM: CPT

## 2021-10-04 PROCEDURE — G0472 HEP C SCREEN HIGH RISK/OTHER: HCPCS

## 2021-10-04 PROCEDURE — 80061 LIPID PANEL: CPT

## 2021-10-04 PROCEDURE — 84443 ASSAY THYROID STIM HORMONE: CPT

## 2021-10-04 PROCEDURE — 80053 COMPREHEN METABOLIC PANEL: CPT

## 2021-11-16 ENCOUNTER — OFFICE VISIT (OUTPATIENT)
Dept: MEDICAL GROUP | Age: 76
End: 2021-11-16
Payer: MEDICARE

## 2021-11-16 VITALS
TEMPERATURE: 98.7 F | SYSTOLIC BLOOD PRESSURE: 108 MMHG | DIASTOLIC BLOOD PRESSURE: 58 MMHG | HEIGHT: 65 IN | HEART RATE: 66 BPM | OXYGEN SATURATION: 95 % | BODY MASS INDEX: 21.13 KG/M2 | WEIGHT: 126.8 LBS

## 2021-11-16 DIAGNOSIS — R73.01 IMPAIRED FASTING GLUCOSE: ICD-10-CM

## 2021-11-16 DIAGNOSIS — E55.9 VITAMIN D DEFICIENCY: ICD-10-CM

## 2021-11-16 DIAGNOSIS — K21.00 GASTROESOPHAGEAL REFLUX DISEASE WITH ESOPHAGITIS, UNSPECIFIED WHETHER HEMORRHAGE: ICD-10-CM

## 2021-11-16 DIAGNOSIS — E78.2 MIXED HYPERLIPIDEMIA: ICD-10-CM

## 2021-11-16 DIAGNOSIS — E53.8 VITAMIN B 12 DEFICIENCY: ICD-10-CM

## 2021-11-16 PROCEDURE — 99214 OFFICE O/P EST MOD 30 MIN: CPT | Performed by: INTERNAL MEDICINE

## 2021-11-16 RX ORDER — CHOLECALCIFEROL (VITAMIN D3) 125 MCG
1250 CAPSULE ORAL DAILY
COMMUNITY

## 2021-11-16 RX ORDER — VITAMIN E 268 MG
180 CAPSULE ORAL DAILY
COMMUNITY

## 2021-11-16 ASSESSMENT — ENCOUNTER SYMPTOMS
RESPIRATORY NEGATIVE: 1
EYES NEGATIVE: 1
NEUROLOGICAL NEGATIVE: 1
MUSCULOSKELETAL NEGATIVE: 1
CARDIOVASCULAR NEGATIVE: 1
GASTROINTESTINAL NEGATIVE: 1
CONSTITUTIONAL NEGATIVE: 1
PSYCHIATRIC NEGATIVE: 1

## 2021-11-16 ASSESSMENT — PATIENT HEALTH QUESTIONNAIRE - PHQ9: CLINICAL INTERPRETATION OF PHQ2 SCORE: 0

## 2021-11-16 ASSESSMENT — FIBROSIS 4 INDEX: FIB4 SCORE: 1.28

## 2021-11-16 NOTE — PROGRESS NOTES
"Charli Madrigal is a 76 y.o. female who presents with Lab Results  ,fu  Patient Active Problem List    Diagnosis Date Noted   • Fatty liver 07/09/2019   • Age-related osteoporosis without current pathological fracture- dexa 2016; fosamax started 12/28/2016   • Gastroesophageal reflux disease with esophagitis- NEG EGD JUNE 2019 11/05/2014   • Vitamin D deficiency disease 10/21/2013   • Impaired fasting glucose 09/28/2012   • Mixed hyperlipidemia- TG > 300 06/14/2012     Patient has no known allergies.  Outpatient Medications Prior to Visit   Medication Sig Dispense Refill   • cyanocobalamin (VITAMIN B-12) 500 MCG Tab Take 1,250 mcg by mouth every day. Twice a week     • vitamin e (VITAMIN E) 400 UNIT Cap Take 180 Units by mouth every day.     • Fenofibrate 134 MG Cap TAKE 1 CAPSULE BY MOUTH IN THE MORNING BEFORE BREAKFAST 90 Cap 4   • omeprazole (PRILOSEC) 40 MG delayed-release capsule Take 1 capsule by mouth once daily 90 Cap 4   • Cholecalciferol (VITAMIN D3) 5000 units Cap Take 1 Cap by mouth every day. 100 Cap 4   • naphazoline-pheniramine (OPCON-A) 0.027-0.315 % SOLN Place 1 Drop in both eyes 3 times a day as needed.       • fluticasone (FLONASE) 50 MCG/ACT nasal spray Use 2 spray(s) in each nostril once daily (Patient not taking: Reported on 11/16/2021) 16 g 11     No facility-administered medications prior to visit.             HPI    Review of Systems   Constitutional: Negative.    HENT: Negative.    Eyes: Negative.    Respiratory: Negative.    Cardiovascular: Negative.    Gastrointestinal: Negative.    Genitourinary: Negative.    Musculoskeletal: Negative.    Skin: Negative.    Neurological: Negative.    Endo/Heme/Allergies: Negative.    Psychiatric/Behavioral: Negative.               Objective     /58 (BP Location: Left arm, Patient Position: Sitting, BP Cuff Size: Adult)   Pulse 66   Temp 37.1 °C (98.7 °F) (Temporal)   Ht 1.651 m (5' 5\")   Wt 57.5 kg (126 lb 12.8 oz)   LMP  (LMP " Unknown)   SpO2 95%   BMI 21.10 kg/m²      Physical Exam  Vitals reviewed.   Constitutional:       General: She is not in acute distress.     Appearance: She is well-developed. She is not diaphoretic.   HENT:      Head: Normocephalic and atraumatic.      Right Ear: External ear normal.      Left Ear: External ear normal.      Nose: Nose normal.      Mouth/Throat:      Pharynx: No oropharyngeal exudate.   Eyes:      General: No scleral icterus.        Right eye: No discharge.         Left eye: No discharge.      Conjunctiva/sclera: Conjunctivae normal.      Pupils: Pupils are equal, round, and reactive to light.   Neck:      Thyroid: No thyromegaly.      Vascular: No JVD.      Trachea: No tracheal deviation.   Cardiovascular:      Rate and Rhythm: Normal rate and regular rhythm.      Heart sounds: Normal heart sounds. No murmur heard.  No friction rub. No gallop.    Pulmonary:      Effort: Pulmonary effort is normal. No respiratory distress.      Breath sounds: Normal breath sounds. No stridor. No wheezing or rales.   Chest:      Chest wall: No tenderness.   Abdominal:      General: Bowel sounds are normal. There is no distension.      Palpations: Abdomen is soft. There is no mass.      Tenderness: There is no abdominal tenderness. There is no guarding or rebound.   Musculoskeletal:         General: No tenderness. Normal range of motion.      Cervical back: Normal range of motion and neck supple.   Lymphadenopathy:      Cervical: No cervical adenopathy.   Skin:     General: Skin is warm and dry.      Coloration: Skin is not pale.      Findings: No erythema or rash.   Neurological:      Mental Status: She is alert and oriented to person, place, and time.      Cranial Nerves: No cranial nerve deficit.      Motor: No abnormal muscle tone.      Coordination: Coordination normal.      Deep Tendon Reflexes: Reflexes are normal and symmetric. Reflexes normal.   Psychiatric:         Behavior: Behavior normal.          Thought Content: Thought content normal.         Judgment: Judgment normal.       No visits with results within 1 Month(s) from this visit.   Latest known visit with results is:   Hospital Outpatient Visit on 10/04/2021   Component Date Value   • Folate -Folic Acid 10/04/2021 15.4    • Vitamin B12 -True Cobala* 10/04/2021 1410*   • 25-Hydroxy   Vitamin D 25 10/04/2021 77    • Glycohemoglobin 10/04/2021 6.1*   • Est Avg Glucose 10/04/2021 128    • WBC 10/04/2021 4.4*   • RBC 10/04/2021 4.39    • Hemoglobin 10/04/2021 14.0    • Hematocrit 10/04/2021 42.1    • MCV 10/04/2021 95.9    • MCH 10/04/2021 31.9    • MCHC 10/04/2021 33.3*   • RDW 10/04/2021 41.8    • Platelet Count 10/04/2021 243    • MPV 10/04/2021 11.2    • Neutrophils-Polys 10/04/2021 54.50    • Lymphocytes 10/04/2021 37.90    • Monocytes 10/04/2021 5.30    • Eosinophils 10/04/2021 1.40    • Basophils 10/04/2021 0.70    • Immature Granulocytes 10/04/2021 0.20    • Nucleated RBC 10/04/2021 0.00    • Neutrophils (Absolute) 10/04/2021 2.37    • Lymphs (Absolute) 10/04/2021 1.65    • Monos (Absolute) 10/04/2021 0.23    • Eos (Absolute) 10/04/2021 0.06    • Baso (Absolute) 10/04/2021 0.03    • Immature Granulocytes (a* 10/04/2021 0.01    • NRBC (Absolute) 10/04/2021 0.00    • Cholesterol,Tot 10/04/2021 217*   • Triglycerides 10/04/2021 144    • HDL 10/04/2021 47    • LDL 10/04/2021 141*   • Sodium 10/04/2021 142    • Potassium 10/04/2021 4.0    • Chloride 10/04/2021 105    • Co2 10/04/2021 26    • Anion Gap 10/04/2021 11.0    • Glucose 10/04/2021 112*   • Bun 10/04/2021 23*   • Creatinine 10/04/2021 0.81    • Calcium 10/04/2021 9.7    • AST(SGOT) 10/04/2021 16    • ALT(SGPT) 10/04/2021 15    • Alkaline Phosphatase 10/04/2021 64    • Total Bilirubin 10/04/2021 0.8    • Albumin 10/04/2021 4.9    • Total Protein 10/04/2021 7.6    • Globulin 10/04/2021 2.7    • A-G Ratio 10/04/2021 1.8    • TSH 10/04/2021 1.420    • Hepatitis C Antibody 10/04/2021 Non-Reactive    •  Fasting Status 10/04/2021 Fasting    • GFR If  10/04/2021 >60    • GFR If Non  Ameri* 10/04/2021 >60       Lab Results   Component Value Date/Time    HBA1C 6.1 (H) 10/04/2021 08:06 AM    HBA1C 6.3 (H) 10/31/2020 08:49 AM     Lab Results   Component Value Date/Time    SODIUM 142 10/04/2021 08:06 AM    POTASSIUM 4.0 10/04/2021 08:06 AM    CHLORIDE 105 10/04/2021 08:06 AM    CO2 26 10/04/2021 08:06 AM    GLUCOSE 112 (H) 10/04/2021 08:06 AM    BUN 23 (H) 10/04/2021 08:06 AM    CREATININE 0.81 10/04/2021 08:06 AM    ALKPHOSPHAT 64 10/04/2021 08:06 AM    ASTSGOT 16 10/04/2021 08:06 AM    ALTSGPT 15 10/04/2021 08:06 AM    TBILIRUBIN 0.8 10/04/2021 08:06 AM     No results found for: INR  Lab Results   Component Value Date/Time    CHOLSTRLTOT 217 (H) 10/04/2021 08:06 AM     (H) 10/04/2021 08:06 AM    HDL 47 10/04/2021 08:06 AM    TRIGLYCERIDE 144 10/04/2021 08:06 AM       No results found for: TESTOSTERONE  No results found for: TSH  Lab Results   Component Value Date/Time    FREET4 0.93 10/14/2013 08:48 AM    FREET4 0.78 06/15/2012 07:11 AM     No results found for: URICACID  No components found for: VITB12  Lab Results   Component Value Date/Time    25HYDROXY 77 10/04/2021 08:06 AM    25HYDROXY 50 10/31/2020 08:49 AM                              Assessment & Plan        1. Mixed hyperlipidemia   Under good control. Continue same regimen.      - TSH; Future  - Comp Metabolic Panel; Future  - Lipid Profile; Future  - CBC WITH DIFFERENTIAL; Future    2. Vitamin D deficiency   Under good control. Continue same regimen.]    - VITAMIN D,25 HYDROXY; Future    3. Vitamin B 12 deficiency  Under good control. Continue same regimen.     - VITAMIN B12; Future    4. Gastroesophageal reflux disease with esophagitis, unspecified whether hemorrhage  Under good control. Continue same regimen.       5. Impaired fasting glucose  Under good control. Continue same regimen.         - HEMOGLOBIN A1C; Future

## 2021-12-29 DIAGNOSIS — K21.00 GASTROESOPHAGEAL REFLUX DISEASE WITH ESOPHAGITIS: ICD-10-CM

## 2021-12-29 RX ORDER — OMEPRAZOLE 40 MG/1
CAPSULE, DELAYED RELEASE ORAL
Qty: 90 CAPSULE | Refills: 1 | Status: SHIPPED | OUTPATIENT
Start: 2021-12-29 | End: 2022-06-28

## 2022-03-11 ENCOUNTER — PATIENT MESSAGE (OUTPATIENT)
Dept: HEALTH INFORMATION MANAGEMENT | Facility: OTHER | Age: 77
End: 2022-03-11
Payer: MEDICARE

## 2022-06-08 ENCOUNTER — TELEPHONE (OUTPATIENT)
Dept: HEALTH INFORMATION MANAGEMENT | Facility: OTHER | Age: 77
End: 2022-06-08

## 2022-08-12 ENCOUNTER — OFFICE VISIT (OUTPATIENT)
Dept: MEDICAL GROUP | Age: 77
End: 2022-08-12
Payer: MEDICARE

## 2022-08-12 ENCOUNTER — HOSPITAL ENCOUNTER (OUTPATIENT)
Dept: RADIOLOGY | Facility: MEDICAL CENTER | Age: 77
End: 2022-08-12
Attending: PHYSICIAN ASSISTANT
Payer: MEDICARE

## 2022-08-12 VITALS
HEIGHT: 65 IN | OXYGEN SATURATION: 96 % | HEART RATE: 74 BPM | SYSTOLIC BLOOD PRESSURE: 108 MMHG | TEMPERATURE: 98.3 F | RESPIRATION RATE: 16 BRPM | WEIGHT: 126 LBS | BODY MASS INDEX: 20.99 KG/M2 | DIASTOLIC BLOOD PRESSURE: 62 MMHG

## 2022-08-12 DIAGNOSIS — S16.1XXA STRAIN OF NECK MUSCLE, INITIAL ENCOUNTER: ICD-10-CM

## 2022-08-12 DIAGNOSIS — S46.812A STRAIN OF LEFT TRAPEZIUS MUSCLE, INITIAL ENCOUNTER: ICD-10-CM

## 2022-08-12 PROCEDURE — 72040 X-RAY EXAM NECK SPINE 2-3 VW: CPT

## 2022-08-12 PROCEDURE — 99214 OFFICE O/P EST MOD 30 MIN: CPT | Performed by: PHYSICIAN ASSISTANT

## 2022-08-12 PROCEDURE — 72070 X-RAY EXAM THORAC SPINE 2VWS: CPT

## 2022-08-12 RX ORDER — CYCLOBENZAPRINE HCL 5 MG
5 TABLET ORAL
Qty: 10 TABLET | Refills: 0 | Status: SHIPPED | OUTPATIENT
Start: 2022-08-12 | End: 2022-08-22

## 2022-08-12 RX ORDER — METHYLPREDNISOLONE 4 MG/1
TABLET ORAL
Qty: 21 TABLET | Refills: 0 | Status: SHIPPED | OUTPATIENT
Start: 2022-08-12 | End: 2022-08-24

## 2022-08-12 ASSESSMENT — PATIENT HEALTH QUESTIONNAIRE - PHQ9: CLINICAL INTERPRETATION OF PHQ2 SCORE: 0

## 2022-08-12 ASSESSMENT — FIBROSIS 4 INDEX: FIB4 SCORE: 1.29

## 2022-08-24 ENCOUNTER — OFFICE VISIT (OUTPATIENT)
Dept: MEDICAL GROUP | Age: 77
End: 2022-08-24
Payer: MEDICARE

## 2022-08-24 VITALS
TEMPERATURE: 97.6 F | HEIGHT: 65 IN | OXYGEN SATURATION: 98 % | SYSTOLIC BLOOD PRESSURE: 112 MMHG | HEART RATE: 78 BPM | DIASTOLIC BLOOD PRESSURE: 60 MMHG | WEIGHT: 124.4 LBS | BODY MASS INDEX: 20.73 KG/M2

## 2022-08-24 DIAGNOSIS — Z12.31 ENCOUNTER FOR SCREENING MAMMOGRAM FOR MALIGNANT NEOPLASM OF BREAST: ICD-10-CM

## 2022-08-24 DIAGNOSIS — M79.10 MYALGIA: ICD-10-CM

## 2022-08-24 DIAGNOSIS — M50.30 DDD (DEGENERATIVE DISC DISEASE), CERVICAL: ICD-10-CM

## 2022-08-24 DIAGNOSIS — M25.512 CHRONIC LEFT SHOULDER PAIN: ICD-10-CM

## 2022-08-24 DIAGNOSIS — G89.29 CHRONIC LEFT SHOULDER PAIN: ICD-10-CM

## 2022-08-24 PROCEDURE — 99214 OFFICE O/P EST MOD 30 MIN: CPT | Performed by: INTERNAL MEDICINE

## 2022-08-24 RX ORDER — MELOXICAM 7.5 MG/1
7.5 TABLET ORAL DAILY
Qty: 60 TABLET | Refills: 0 | Status: SHIPPED | OUTPATIENT
Start: 2022-08-24 | End: 2022-10-20

## 2022-08-24 RX ORDER — TRAMADOL HYDROCHLORIDE 50 MG/1
50 TABLET ORAL
Qty: 20 TABLET | Refills: 0 | Status: SHIPPED | OUTPATIENT
Start: 2022-08-24 | End: 2022-09-03

## 2022-08-24 RX ORDER — TIZANIDINE 4 MG/1
4 TABLET ORAL 2 TIMES DAILY
Qty: 60 TABLET | Refills: 0 | Status: SHIPPED | OUTPATIENT
Start: 2022-08-24 | End: 2022-08-31

## 2022-08-24 ASSESSMENT — FIBROSIS 4 INDEX: FIB4 SCORE: 1.29

## 2022-08-24 NOTE — PROGRESS NOTES
CHIEF COMPLAINT  Chief Complaint   Patient presents with    Shoulder Pain     Left arm and shoulder. Started back in October. X-ray completed on 8/12/22     HPI  Kandy Madrigal is a 77 y.o. female who presents today for the following     DDD cervical spine, LT shoulder pain, Myalgia  - Onset: fall, 2021  - Triger: no trauma  - intensity:  mild to moderate  - quality:  dull and sharp (intermittently and with activity)  - radiation:  no  - alleviating factors are:  rest, steroid po  -  exacerbating factors are:  activity  - accompanied:   - no numbness, weakness, tingling, fever, chills  - imaging: XR 8/12/22 showed DDD cervical spine, max C5-C6.  - treatment: as above    No reported history of:  - chronic immune suppression, alcoholism, IV drug abuse, indwelling catheter, diabetes.    - No history of recent spinal surgery or injection.    Denies:  - numbness/saddle anesthesia.  - bowel/bladder changes, fever.   - trauma  - nausea/vomiting  - chest pain, shortness of breath, abdominal pain.      Reviewed PMH, PSH, FH, SH, ALL, HCM/IMM, no changes  Reviewed MEDS, no changes    Patient Active Problem List    Diagnosis Date Noted    Fatty liver 07/09/2019    Age-related osteoporosis without current pathological fracture- dexa 2016; fosamax started 12/28/2016    Gastroesophageal reflux disease with esophagitis- NEG EGD JUNE 2019 11/05/2014    Vitamin D deficiency disease 10/21/2013    Impaired fasting glucose 09/28/2012    Mixed hyperlipidemia- TG > 300 06/14/2012     CURRENT MEDICATIONS  Current Outpatient Medications   Medication Sig Dispense Refill    methylPREDNISolone (MEDROL DOSEPAK) 4 MG Tablet Therapy Pack As directed on the packaging label. 21 Tablet 0    omeprazole (PRILOSEC) 40 MG delayed-release capsule Take 1 capsule by mouth once daily 90 Capsule 4    Fenofibrate 134 MG Cap TAKE 1 CAPSULE BY MOUTH IN THE MORNING BEFORE BREAKFAST 90 Capsule 3    cyanocobalamin (VITAMIN B-12) 500 MCG Tab Take 1,250 mcg by mouth  "every day. Twice a week      vitamin e (VITAMIN E) 400 UNIT Cap Take 180 Units by mouth every day.      Cholecalciferol (VITAMIN D3) 5000 units Cap Take 1 Cap by mouth every day. 100 Cap 4    naphazoline-pheniramine (OPCON-A) 0.027-0.315 % SOLN Place 1 Drop in both eyes 3 times a day as needed.         No current facility-administered medications for this visit.     ALLERGIES  Allergies: Patient has no known allergies.  PAST MEDICAL HISTORY  Past Medical History:   Diagnosis Date    Allergy      SURGICAL HISTORY  She  has a past surgical history that includes tonsillectomy ().  SOCIAL HISTORY  Social History     Tobacco Use    Smoking status: Never    Smokeless tobacco: Never   Vaping Use    Vaping Use: Never used   Substance Use Topics    Alcohol use: No    Drug use: No     Social History     Social History Narrative    Not on file     FAMILY HISTORY  Family History   Problem Relation Age of Onset    Other Father         kidney failure    Genetic Disorder Paternal Grandfather         liver problems     Family Status   Relation Name Status    Mo      Fa      Sis  Alive    Bro  Alive    MGMo      MGFa      PGMo      PGFa      Bro  Alive    Bro  Alive     ROS   Constitutional: Negative for fever, chills, fatigue.  HENT: Negative for congestion, sore throat.  Respiratory: Negative for cough, shortness of breath.  Cardiovascular: Negative for chest pain, palpitations.   Gastrointestinal: Negative for heartburn, nausea, abdominal pain.   Musculoskeletal: Per HPI.  Skin: Negative for rash.   Neuro: Negative for dizziness, weakness and headaches.   Endo/Heme/Allergies: Does not bruise/bleed easily.   Psychiatric/Behavioral: Negative for depression.    PHYSICAL EXAM   Blood Pressure 112/60 (BP Location: Right arm, Patient Position: Sitting, BP Cuff Size: Adult)   Pulse 78   Temperature 36.4 °C (97.6 °F) (Temporal)   Height 1.651 m (5' 5\")   Weight 56.4 kg (124 lb 6.4 " oz)   Oxygen Saturation 98%  Body mass index is 20.7 kg/m².  General:  NAD, well appearing  HEENT:   NC/AT, PERRLA, EOMI.  Cardiovascular: unlabored breathing, no peripheral cyanosis or swelling.  Lungs:   no respiratory distress.  Abdomen: non- distended.  Extremities:  No LE swelling.  Skin:  Warm, dry.  No erythema. No rash.   Neurologic: Alert & oriented x 3. CN II-XII grossly intact. No focal deficits.  Psychiatric:  Affect normal, mood normal, judgment normal.    Labs     Labs are reviewed and discussed with a patient  Lab Results   Component Value Date/Time    CHOLSTRLTOT 217 (H) 10/04/2021 08:06 AM     (H) 10/04/2021 08:06 AM    HDL 47 10/04/2021 08:06 AM    TRIGLYCERIDE 144 10/04/2021 08:06 AM       Lab Results   Component Value Date/Time    SODIUM 142 10/04/2021 08:06 AM    POTASSIUM 4.0 10/04/2021 08:06 AM    CHLORIDE 105 10/04/2021 08:06 AM    CO2 26 10/04/2021 08:06 AM    GLUCOSE 112 (H) 10/04/2021 08:06 AM    BUN 23 (H) 10/04/2021 08:06 AM    CREATININE 0.81 10/04/2021 08:06 AM     Lab Results   Component Value Date/Time    ALKPHOSPHAT 64 10/04/2021 08:06 AM    ASTSGOT 16 10/04/2021 08:06 AM    ALTSGPT 15 10/04/2021 08:06 AM    TBILIRUBIN 0.8 10/04/2021 08:06 AM      Lab Results   Component Value Date/Time    HBA1C 6.1 (H) 10/04/2021 08:06 AM    HBA1C 6.3 (H) 10/31/2020 08:49 AM    HBA1C 6.1 (H) 11/01/2019 07:45 AM     No results found for: TSH  Lab Results   Component Value Date/Time    FREET4 0.93 10/14/2013 08:48 AM    FREET4 0.78 06/15/2012 07:11 AM       Lab Results   Component Value Date/Time    WBC 4.4 (L) 10/04/2021 08:06 AM    RBC 4.39 10/04/2021 08:06 AM    HEMOGLOBIN 14.0 10/04/2021 08:06 AM    HEMATOCRIT 42.1 10/04/2021 08:06 AM    MCV 95.9 10/04/2021 08:06 AM    MCH 31.9 10/04/2021 08:06 AM    MCHC 33.3 (L) 10/04/2021 08:06 AM    MPV 11.2 10/04/2021 08:06 AM    NEUTSPOLYS 54.50 10/04/2021 08:06 AM    LYMPHOCYTES 37.90 10/04/2021 08:06 AM    MONOCYTES 5.30 10/04/2021 08:06 AM     EOSINOPHILS 1.40 10/04/2021 08:06 AM    BASOPHILS 0.70 10/04/2021 08:06 AM      Imaging     Reviewed and discussed per HPI    Assessment and Plan     Kandy Madrigal is a 77 y.o. female    1. DDD (degenerative disc disease), cervical   -Advised to continue activity as tolerated, given muscle relaxant tramadol and meloxicam for limited duration due to age; reviewed renal panel  -Refer to physical therapy  Heat packs / massage can help  - Referral to Physical Therapy  - tizanidine (ZANAFLEX) 4 MG Tab; Take 1 Tablet by mouth 2 times a day.  Dispense: 60 Tablet; Refill: 0  - traMADol (ULTRAM) 50 MG Tab; Take 1 Tablet by mouth 2 times daily with meals as needed for Severe Pain for up to 10 days.  Dispense: 20 Tablet; Refill: 0  - meloxicam (MOBIC) 7.5 MG Tab; Take 1 Tablet by mouth every day.  Dispense: 60 Tablet; Refill: 0  2. Chronic left shoulder pain  - Referral to Physical Therapy  - tizanidine (ZANAFLEX) 4 MG Tab; Take 1 Tablet by mouth 2 times a day.  Dispense: 60 Tablet; Refill: 0  - traMADol (ULTRAM) 50 MG Tab; Take 1 Tablet by mouth 2 times daily with meals as needed for Severe Pain for up to 10 days.  Dispense: 20 Tablet; Refill: 0  - meloxicam (MOBIC) 7.5 MG Tab; Take 1 Tablet by mouth every day.  Dispense: 60 Tablet; Refill: 0  3. Myalgia  - Referral to Physical Therapy  - tizanidine (ZANAFLEX) 4 MG Tab; Take 1 Tablet by mouth 2 times a day.  Dispense: 60 Tablet; Refill: 0  - traMADol (ULTRAM) 50 MG Tab; Take 1 Tablet by mouth 2 times daily with meals as needed for Severe Pain for up to 10 days.  Dispense: 20 Tablet; Refill: 0  - meloxicam (MOBIC) 7.5 MG Tab; Take 1 Tablet by mouth every day.  Dispense: 60 Tablet; Refill: 0    Obtained and reviewed patient utilization report from [facility] pharmacy database on 8/24/2022 11:29 AM  prior to writing prescription for controlled substance II, III or IV per [place] bill . Based on assessment of the report, the prescription is medically necessary.     4. Encounter  for screening mammogram for malignant neoplasm of breast  - MA-SCREENING MAMMO BILAT W/TOMOSYNTHESIS W/CAD; Future    Followup: Return if symptoms worsen or fail to improve.    All questions are answered.    Please note that this dictation was created using voice recognition software, and that there might be errors of luke and possibly content.

## 2022-08-31 ENCOUNTER — OFFICE VISIT (OUTPATIENT)
Dept: MEDICAL GROUP | Age: 77
End: 2022-08-31
Payer: MEDICARE

## 2022-08-31 VITALS
SYSTOLIC BLOOD PRESSURE: 120 MMHG | OXYGEN SATURATION: 95 % | TEMPERATURE: 96.9 F | WEIGHT: 124 LBS | BODY MASS INDEX: 20.66 KG/M2 | HEART RATE: 74 BPM | HEIGHT: 65 IN | DIASTOLIC BLOOD PRESSURE: 62 MMHG | RESPIRATION RATE: 16 BRPM

## 2022-08-31 DIAGNOSIS — K76.0 FATTY LIVER: ICD-10-CM

## 2022-08-31 DIAGNOSIS — I70.0 ATHEROSCLEROSIS OF ABDOMINAL AORTA (HCC): ICD-10-CM

## 2022-08-31 DIAGNOSIS — M54.2 CHRONIC NECK PAIN: ICD-10-CM

## 2022-08-31 DIAGNOSIS — G89.29 CHRONIC NECK PAIN: ICD-10-CM

## 2022-08-31 DIAGNOSIS — M25.512 CHRONIC LEFT SHOULDER PAIN: ICD-10-CM

## 2022-08-31 DIAGNOSIS — G89.29 CHRONIC LEFT SHOULDER PAIN: ICD-10-CM

## 2022-08-31 DIAGNOSIS — E78.2 MIXED HYPERLIPIDEMIA: ICD-10-CM

## 2022-08-31 PROCEDURE — 99214 OFFICE O/P EST MOD 30 MIN: CPT | Performed by: INTERNAL MEDICINE

## 2022-08-31 ASSESSMENT — ENCOUNTER SYMPTOMS
CARDIOVASCULAR NEGATIVE: 1
CONSTITUTIONAL NEGATIVE: 1
GASTROINTESTINAL NEGATIVE: 1
EYES NEGATIVE: 1
MUSCULOSKELETAL NEGATIVE: 1
RESPIRATORY NEGATIVE: 1
PSYCHIATRIC NEGATIVE: 1
NEUROLOGICAL NEGATIVE: 1

## 2022-08-31 ASSESSMENT — FIBROSIS 4 INDEX: FIB4 SCORE: 1.29

## 2022-09-01 NOTE — PROGRESS NOTES
Subjective     Kandy Madrigal is a 77 y.o. female who presents with Neck Pain (X 3 weeks )  She has not responded to tizanidine for muscle relaxation nor tramadol for pain.  Pain is primarily related to the base the neck and the mid trapezius muscle on the left, as well as the left shoulder joint and left upper arm.  X-rays reveal degenerative disc disease of the C-spine and T-spine a few weeks ago..  Has been seen twice in our office in the last 3 weeks.  She was referred to physical therapy but has not made an appointment yet.  She denies any paresthesias or paresis but does complain of radicular pain down the left arm from her neck.  He has no duction and limitation of range of motion of left shoulder.    Other medical problems:  The patient is here for followup of chronic medical problems listed below. The patient is compliant with medications and having no side effects from them. Denies chest pain, abdominal pain, dyspnea, myalgias, or cough.   Patient Active Problem List    Diagnosis Date Noted    Atherosclerosis of abdominal aorta (HCC) 08/31/2022    DDD (degenerative disc disease), cervical 08/24/2022    Chronic left shoulder pain 08/24/2022    Fatty liver 07/09/2019    Age-related osteoporosis without current pathological fracture- dexa 2016; fosamax started 12/28/2016    Gastroesophageal reflux disease with esophagitis- NEG EGD JUNE 2019 11/05/2014    Vitamin D deficiency disease 10/21/2013    Impaired fasting glucose 09/28/2012    Mixed hyperlipidemia- TG > 300 06/14/2012     Outpatient Medications Prior to Visit   Medication Sig Dispense Refill    traMADol (ULTRAM) 50 MG Tab Take 1 Tablet by mouth 2 times daily with meals as needed for Severe Pain for up to 10 days. 20 Tablet 0    meloxicam (MOBIC) 7.5 MG Tab Take 1 Tablet by mouth every day. 60 Tablet 0    omeprazole (PRILOSEC) 40 MG delayed-release capsule Take 1 capsule by mouth once daily 90 Capsule 4    Fenofibrate 134 MG Cap TAKE 1 CAPSULE BY MOUTH IN  "THE MORNING BEFORE BREAKFAST 90 Capsule 3    cyanocobalamin (VITAMIN B-12) 500 MCG Tab Take 1,250 mcg by mouth every day. Twice a week      vitamin e (VITAMIN E) 400 UNIT Cap Take 180 Units by mouth every day.      Cholecalciferol (VITAMIN D3) 5000 units Cap Take 1 Cap by mouth every day. 100 Cap 4    naphazoline-pheniramine (OPCON-A) 0.027-0.315 % SOLN Place 1 Drop in both eyes 3 times a day as needed.        tizanidine (ZANAFLEX) 4 MG Tab Take 1 Tablet by mouth 2 times a day. (Patient not taking: Reported on 8/31/2022) 60 Tablet 0     No facility-administered medications prior to visit.               HPI    Review of Systems   Constitutional: Negative.    HENT: Negative.     Eyes: Negative.    Respiratory: Negative.     Cardiovascular: Negative.    Gastrointestinal: Negative.    Genitourinary: Negative.    Musculoskeletal: Negative.    Skin: Negative.    Neurological: Negative.    Endo/Heme/Allergies: Negative.    Psychiatric/Behavioral: Negative.              Objective     /62 (BP Location: Right arm, Patient Position: Sitting, BP Cuff Size: Adult)   Pulse 74   Temp 36.1 °C (96.9 °F) (Temporal)   Resp 16   Ht 1.651 m (5' 5\")   Wt 56.2 kg (124 lb)   LMP  (LMP Unknown)   SpO2 95%   BMI 20.63 kg/m²      Physical Exam  Vitals reviewed.   Constitutional:       General: She is not in acute distress.     Appearance: She is well-developed. She is not diaphoretic.   HENT:      Head: Normocephalic and atraumatic.      Right Ear: External ear normal.      Left Ear: External ear normal.      Nose: Nose normal.      Mouth/Throat:      Pharynx: No oropharyngeal exudate.   Eyes:      General: No scleral icterus.        Right eye: No discharge.         Left eye: No discharge.      Conjunctiva/sclera: Conjunctivae normal.      Pupils: Pupils are equal, round, and reactive to light.   Neck:      Thyroid: No thyromegaly.      Vascular: No JVD.      Trachea: No tracheal deviation.   Cardiovascular:      Rate and " Rhythm: Normal rate and regular rhythm.      Heart sounds: Normal heart sounds. No murmur heard.    No friction rub. No gallop.   Pulmonary:      Effort: Pulmonary effort is normal. No respiratory distress.      Breath sounds: Normal breath sounds. No stridor. No wheezing or rales.   Chest:      Chest wall: No tenderness.   Abdominal:      General: Bowel sounds are normal. There is no distension.      Palpations: Abdomen is soft. There is no mass.      Tenderness: There is no abdominal tenderness. There is no guarding or rebound.   Musculoskeletal:         General: No tenderness. Normal range of motion.      Cervical back: Normal range of motion and neck supple.   Lymphadenopathy:      Cervical: No cervical adenopathy.   Skin:     General: Skin is warm and dry.      Coloration: Skin is not pale.      Findings: No erythema or rash.   Neurological:      Mental Status: She is alert and oriented to person, place, and time.      Cranial Nerves: No cranial nerve deficit.      Motor: No abnormal muscle tone.      Coordination: Coordination normal.      Deep Tendon Reflexes: Reflexes are normal and symmetric. Reflexes normal.   Psychiatric:         Behavior: Behavior normal.         Thought Content: Thought content normal.         Judgment: Judgment normal.          No visits with results within 1 Month(s) from this visit.   Latest known visit with results is:   Hospital Outpatient Visit on 10/04/2021   Component Date Value    Folate -Folic Acid 10/04/2021 15.4     Vitamin B12 -True Cobala* 10/04/2021 1410 (A)    25-Hydroxy   Vitamin D 25 10/04/2021 77     Glycohemoglobin 10/04/2021 6.1 (A)    Est Avg Glucose 10/04/2021 128     WBC 10/04/2021 4.4 (A)    RBC 10/04/2021 4.39     Hemoglobin 10/04/2021 14.0     Hematocrit 10/04/2021 42.1     MCV 10/04/2021 95.9     MCH 10/04/2021 31.9     MCHC 10/04/2021 33.3 (A)    RDW 10/04/2021 41.8     Platelet Count 10/04/2021 243     MPV 10/04/2021 11.2     Neutrophils-Polys 10/04/2021  54.50     Lymphocytes 10/04/2021 37.90     Monocytes 10/04/2021 5.30     Eosinophils 10/04/2021 1.40     Basophils 10/04/2021 0.70     Immature Granulocytes 10/04/2021 0.20     Nucleated RBC 10/04/2021 0.00     Neutrophils (Absolute) 10/04/2021 2.37     Lymphs (Absolute) 10/04/2021 1.65     Monos (Absolute) 10/04/2021 0.23     Eos (Absolute) 10/04/2021 0.06     Baso (Absolute) 10/04/2021 0.03     Immature Granulocytes (a* 10/04/2021 0.01     NRBC (Absolute) 10/04/2021 0.00     Cholesterol,Tot 10/04/2021 217 (A)    Triglycerides 10/04/2021 144     HDL 10/04/2021 47     LDL 10/04/2021 141 (A)    Sodium 10/04/2021 142     Potassium 10/04/2021 4.0     Chloride 10/04/2021 105     Co2 10/04/2021 26     Anion Gap 10/04/2021 11.0     Glucose 10/04/2021 112 (A)    Bun 10/04/2021 23 (A)    Creatinine 10/04/2021 0.81     Calcium 10/04/2021 9.7     AST(SGOT) 10/04/2021 16     ALT(SGPT) 10/04/2021 15     Alkaline Phosphatase 10/04/2021 64     Total Bilirubin 10/04/2021 0.8     Albumin 10/04/2021 4.9     Total Protein 10/04/2021 7.6     Globulin 10/04/2021 2.7     A-G Ratio 10/04/2021 1.8     TSH 10/04/2021 1.420     Hepatitis C Antibody 10/04/2021 Non-Reactive     Fasting Status 10/04/2021 Fasting     GFR If African American 10/04/2021 >60     GFR If Non  Ameri* 10/04/2021 >60       Lab Results   Component Value Date/Time    HBA1C 6.1 (H) 10/04/2021 08:06 AM    HBA1C 6.3 (H) 10/31/2020 08:49 AM     Lab Results   Component Value Date/Time    SODIUM 142 10/04/2021 08:06 AM    POTASSIUM 4.0 10/04/2021 08:06 AM    CHLORIDE 105 10/04/2021 08:06 AM    CO2 26 10/04/2021 08:06 AM    GLUCOSE 112 (H) 10/04/2021 08:06 AM    BUN 23 (H) 10/04/2021 08:06 AM    CREATININE 0.81 10/04/2021 08:06 AM    ALKPHOSPHAT 64 10/04/2021 08:06 AM    ASTSGOT 16 10/04/2021 08:06 AM    ALTSGPT 15 10/04/2021 08:06 AM    TBILIRUBIN 0.8 10/04/2021 08:06 AM     No results found for: INR  Lab Results   Component Value Date/Time    CHOLSTRLTOT 217 (H)  10/04/2021 08:06 AM     (H) 10/04/2021 08:06 AM    HDL 47 10/04/2021 08:06 AM    TRIGLYCERIDE 144 10/04/2021 08:06 AM       No results found for: TESTOSTERONE  No results found for: TSH  Lab Results   Component Value Date/Time    FREET4 0.93 10/14/2013 08:48 AM    FREET4 0.78 06/15/2012 07:11 AM     No results found for: URICACID  No components found for: VITB12  Lab Results   Component Value Date/Time    25HYDROXY 77 10/04/2021 08:06 AM    25HYDROXY 50 10/31/2020 08:49 AM   '                      Assessment & Plan        1. Chronic left shoulder pain-not well controlled.  Refer to orthopedics for further management.  She has failed tizanidine and tramadol and meloxicam..  X-rays confirm degenerative disc disease of the C-spine and UP WITH SPECIALIST.     Further imaging may be necessary however depending on orthopedic consultation.     - Referral to Orthopedics    2. Chronic neck pain     As above    3. Mixed hyperlipidemia- TG > 300 previously, down to 144 last year., LDL = 141 1 year ago.  Needs rechecking.  Fenofibrate does not seem to be working.  Not at goal.  Despite taking fenofibrate.  No known allergy to statin.  Since her Hubbell risk score is 15.8%, she should be on a statin for primary prevention against cardiovascular disease, either in addition to fenofibrate or as high intensity statin alone with Lipitor 80 or Crestor 40.    4. Atherosclerosis of abdominal aorta (HCC)  Needs statin.  We will discuss mona on follow-up visit.    5. Fatty liver      Transaminases normal.  Continue with high-protein low-carb low-fat diet.

## 2022-09-08 ENCOUNTER — PHYSICAL THERAPY (OUTPATIENT)
Dept: PHYSICAL THERAPY | Facility: REHABILITATION | Age: 77
End: 2022-09-08
Attending: INTERNAL MEDICINE
Payer: MEDICARE

## 2022-09-08 DIAGNOSIS — M79.10 MYALGIA: ICD-10-CM

## 2022-09-08 DIAGNOSIS — G89.29 CHRONIC LEFT SHOULDER PAIN: ICD-10-CM

## 2022-09-08 DIAGNOSIS — M25.512 CHRONIC LEFT SHOULDER PAIN: ICD-10-CM

## 2022-09-08 DIAGNOSIS — M50.30 DDD (DEGENERATIVE DISC DISEASE), CERVICAL: ICD-10-CM

## 2022-09-08 PROCEDURE — 97140 MANUAL THERAPY 1/> REGIONS: CPT

## 2022-09-08 PROCEDURE — 97161 PT EVAL LOW COMPLEX 20 MIN: CPT

## 2022-09-08 PROCEDURE — 97535 SELF CARE MNGMENT TRAINING: CPT

## 2022-09-08 NOTE — OP THERAPY EVALUATION
Outpatient Physical Therapy  INITIAL EVALUATION    [facility] [address]  [address]  [place] [address]  Phone:  [phone]  Fax:  [phone]    Date of Evaluation: 2022    Patient: Rachele Madrigal  YOB: 1945  MRN: [id number]     Referring Provider: [place] [address] [address] [address]   Referring Diagnosis DDD (degenerative disc disease), cervical [M50.30];Chronic left shoulder pain [M25.512, G89.29];Myalgia [M79.10]     Time Calculation                 Chief Complaint: Arm Problem    Visit Diagnoses     ICD-10-CM   1. DDD (degenerative disc disease), cervical  M50.30   2. Chronic left shoulder pain  M25.512    G89.29   3. Myalgia  M79.10       Date of onset of impairment: 2022    Subjective:   History of Present Illness:     Mechanism of injury:  Pt. is a 77 Y.O. M/F who reports about 2 mo. Ago her L neck and shoulder were exacerbated of unknown causes.  She has stopped heavy housework and lifting , pts.  is doing these tasks.  She went to her PCP who prescribed Meloxicam and Tramadol for the pain.  Pt. Also takes Tylenol ES PRN.     PLOF: Mild IM SH pain w/heavy housework, lifting  PMHx: significant for L sh PAIN, childhood accident fall down stairs, injury to neck and back.       Pain:     Current pain ratin    At best pain ratin    At worst pain ratin    Pain Comments::  PAIN  Location: 1. Neck post , refers to: post arm  2. arm  Descriptors: 1. Pain, sore  2. weakness   Aggravated with house work, settles with meds, pain patch, lasting after settles hours .Eases with pain meds - night time worse need to take, patch  Progression: same   Diagnostic Tests:     X-ray: abnormal (T/S R scoliusis)      Past Medical History:   Diagnosis Date    Allergy      Past Surgical History:   Procedure Laterality Date    TONSILLECTOMY       Social History     Tobacco Use    Smoking status: Never    Smokeless tobacco: Never    Substance Use Topics    Alcohol use: No     Family and Occupational History     Socioeconomic History    Marital status:      Spouse name: Not on file    Number of children: Not on file    Years of education: Not on file    Highest education level: Not on file   Occupational History    Not on file       Objective     Observations   Central spine     Positive for forward head/neck and thoracic scoliosis.    Active Range of Motion     Cervical Spine   Flexion: within functional limits  Extension: within functional limits  Left lateral flexion: decreased (16 deg)  Right lateral flexion: Active right cervical lateral flexion: 20 deg.  Left rotation: Active left cervical rotation: 20.  Right rotation: Active right cervical rotation: 35 deg.    Joint Play   Spine     Central PA Newcomb        C0-1: WFL       C2: painful with grade 2       C3:  with grade 2       C4: hypomobile with grade 3       C5: painful with grade 3       C6: hypomobile with grade 4       C7: hypomobile and painful with grade 4      Strength:      Additional Strength Details  L handed    Tests     Left Shoulder   Positive Spurling's sign.       Therapeutic Treatments and Modalities:     1. Manual Therapy (CPT 49722), C7 PA, LULPA G3-4 3 bouts ea., //relief of SH symptoms    2. Self Care ADL Training (CPT 85322), pt. education w/use of the spine model  Time-based treatments/modalities:    Physical Therapy Timed Treatment Charges  Functional training, self care minutes (CPT 11407): 14 minutes  Manual therapy minutes (CPT 99584): 10 minutes      Assessment, Response and Plan:   Impairments: abnormal ADL function, abnormal or restricted ROM and lacks appropriate home exercise program    Barriers to therapy:  None  Prognosis: good    Goals:   Short Term Goals:   Pain reduced to <or=4/10, centralized  Inc. AROM LSB 20deg and LROT 45deg.  Short term goal time span:  2-4 weeks      Long Term Goals:    Inc. AROM LSB 25 and LROT 50 deg.  Teaches back  proper posture with transitional movements, lifting  I HEP/ self care    Long term goal time span:  4-6 weeks    Plan:   Planned therapy interventions:  Self Care ADL Training (CPT 14649), E Stim Unattended (CPT 19068), TENS Applicaton (CPT 79825), Ultrasound (CPT 43154), Hot or Cold Pack Therapy (CPT 00404), Manual Therapy (CPT 42168), Massage (CPT 60647), Neuromuscular Re-education (CPT 66556) and Therapeutic Exercise (CPT 48513)  Frequency:  1x week  Duration in visits:  8  Discussed with:  Patient and caregiver    Functional Assessment Used        Referring provider co-signature:  I have reviewed this plan of care and my co-signature certifies the need for services.    Certification Period: 09/08/2022 to  10/28/22    Physician Signature: ________________________________ Date: ______________                  normal...

## 2022-09-09 ASSESSMENT — ENCOUNTER SYMPTOMS
PAIN SCALE AT HIGHEST: 8
PAIN SCALE: 5
PAIN SCALE AT LOWEST: 0

## 2022-09-19 ENCOUNTER — PHYSICAL THERAPY (OUTPATIENT)
Dept: PHYSICAL THERAPY | Facility: REHABILITATION | Age: 77
End: 2022-09-19
Attending: INTERNAL MEDICINE
Payer: MEDICARE

## 2022-09-19 DIAGNOSIS — M50.30 DDD (DEGENERATIVE DISC DISEASE), CERVICAL: ICD-10-CM

## 2022-09-19 PROCEDURE — 97140 MANUAL THERAPY 1/> REGIONS: CPT

## 2022-09-19 PROCEDURE — 97535 SELF CARE MNGMENT TRAINING: CPT

## 2022-09-19 PROCEDURE — 97110 THERAPEUTIC EXERCISES: CPT

## 2022-09-19 NOTE — OP THERAPY DAILY TREATMENT
Outpatient Physical Therapy  DAILY TREATMENT     [facility] Physical [address].  [address]  [place] [address]  Phone:  [phone]  Fax:  [phone]    Date: 09/19/2022    Patient: Rachele Madrigal  YOB: 1945  MRN: [id number]     Time Calculation    Start time: 1400  Stop time: 1445 Time Calculation (min): 45 minutes         Chief Complaint: Arm Problem    Visit #: 2    SUBJECTIVE:  Pt. States that her pain is back. She feels it in the L arm down to the post. Hand. She c/o difficulty using her hand to  (3rd and 4th digits).    PAIN  Pain rating (1-10) before treatment:  5  Pain rating (1-10) after treatment:  1  Location: 1. Neck post , refers to: post arm  2. arm  Descriptors: 1. Pain, sore  2. weakness   Aggravated with house work, settles with meds, pain patch,    OBJECTIVE:  Current objective measures: Active Range of Motion   C/S AROM  Left lateral flexion: 25 deg  Left rotation: 50          Therapeutic Exercises (CPT 57547):     1. seated L, R sidebend, 2z5, w/towel    2. seated L, R rotation, 2.5, w/towel      Therapeutic Exercise Summary: Hip strengthen and stretch [[id number]]    NEck Side Bend with Towel Support -  Repeat 5 Times, Complete 2 Sets, Perform 2 Times a Day    Neck Rotation with Towel Support -  Repeat 5 Times, Complete 2 Sets, Perform 2 Times a Day    Neck Stretch with Towel -     Therapeutic Treatments and Modalities:     1. Manual Therapy (CPT 49147), C5PA G4, C6, C7 PA, LULPA G3-to 4  3 bouts ea., //relief of SH symptoms    2. Manual Therapy (CPT 86488), STM to L deltoid,subscapularis, triceps, w/tennis ball    3. Self Care ADL Training (CPT 31204), prop for sleep, rest; hep  Time-based treatments/modalities:    Physical Therapy Timed Treatment Charges  Functional training, self care minutes (CPT 11409): 12 minutes  Manual therapy minutes (CPT 02484): 15 minutes  Therapeutic exercise minutes (CPT 47667): 16 minutes        ASSESSMENT:   Response to  treatment: responds well with increased ROM  (7-25 deg. Gain) & reduced sx. To a 'stiff' feeling. Pt. Returns demo of hep correctly to do w/handout.    PLAN/RECOMMENDATIONS:   Plan for treatment: therapy treatment to continue next visit.  Planned interventions for next visit: continue with current treatment.

## 2022-09-21 ENCOUNTER — PHYSICAL THERAPY (OUTPATIENT)
Dept: PHYSICAL THERAPY | Facility: REHABILITATION | Age: 77
End: 2022-09-21
Attending: INTERNAL MEDICINE
Payer: MEDICARE

## 2022-09-21 DIAGNOSIS — G89.29 CHRONIC LEFT SHOULDER PAIN: ICD-10-CM

## 2022-09-21 DIAGNOSIS — M25.512 CHRONIC LEFT SHOULDER PAIN: ICD-10-CM

## 2022-09-21 DIAGNOSIS — M50.30 DDD (DEGENERATIVE DISC DISEASE), CERVICAL: ICD-10-CM

## 2022-09-21 PROCEDURE — 97140 MANUAL THERAPY 1/> REGIONS: CPT

## 2022-09-21 PROCEDURE — 97110 THERAPEUTIC EXERCISES: CPT

## 2022-09-21 NOTE — OP THERAPY DAILY TREATMENT
Outpatient Physical Therapy  DAILY TREATMENT     [facility] Physical [address].  [address]  [place] [address]  Phone:  [phone]  Fax:  [phone]    Date: 09/21/2022    Patient: Rachele Madrigal  YOB: 1945  MRN: [id number]     Time Calculation    Start time: 1130  Stop time: 1211 Time Calculation (min): 41 minutes         Chief Complaint: Shoulder Problem and Neck Problem    Visit #: 3    SUBJECTIVE:  Pt reports pain overall improving, still bothersome.       OBJECTIVE:  Current objective measures: Active Range of Motion   C/S AROM  Left lateral flexion: 25 deg  Left rotation: 50          Therapeutic Exercises (CPT 07848):     1. Left upper trap stretch, 4 x 30 se    2. left levator scap stretch, 4 x 30 sec    3. scap retraction, 2 x 10, mod tactile cues      Therapeutic Exercise Summary: Hip strengthen and stretch [[id number]]    NEck Side Bend with Towel Support -  Repeat 5 Times, Complete 2 Sets, Perform 2 Times a Day    Neck Rotation with Towel Support -  Repeat 5 Times, Complete 2 Sets, Perform 2 Times a Day    Neck Stretch with Towel -     Therapeutic Treatments and Modalities:     2. Manual Therapy (CPT 57323), STM to L deltoid,subscapularis, triceps, upper trap, posterior cervical musculature, pt report sig dec in pain  Time-based treatments/modalities:    Physical Therapy Timed Treatment Charges  Manual therapy minutes (CPT 48116): 25 minutes  Therapeutic exercise minutes (CPT 84075): 16 minutes        ASSESSMENT:   Response to treatment: Tolerated new interventions with min pain. Mod difficulty performing retraction without upper trap recruitment, improved with cues.    PLAN/RECOMMENDATIONS:   Plan for treatment: therapy treatment to continue next visit.  Planned interventions for next visit: continue with current treatment.         
no

## 2022-09-24 ENCOUNTER — APPOINTMENT (OUTPATIENT)
Dept: URGENT CARE | Facility: PHYSICIAN GROUP | Age: 77
End: 2022-09-24

## 2022-09-24 ENCOUNTER — OFFICE VISIT (OUTPATIENT)
Dept: URGENT CARE | Facility: PHYSICIAN GROUP | Age: 77
End: 2022-09-24
Payer: MEDICARE

## 2022-09-24 VITALS
TEMPERATURE: 97.3 F | DIASTOLIC BLOOD PRESSURE: 70 MMHG | HEART RATE: 82 BPM | HEIGHT: 65 IN | WEIGHT: 123 LBS | SYSTOLIC BLOOD PRESSURE: 116 MMHG | RESPIRATION RATE: 18 BRPM | OXYGEN SATURATION: 95 % | BODY MASS INDEX: 20.49 KG/M2

## 2022-09-24 DIAGNOSIS — U07.1 COVID-19 VIRUS INFECTION: Primary | ICD-10-CM

## 2022-09-24 DIAGNOSIS — R05.9 COUGH IN ADULT: ICD-10-CM

## 2022-09-24 PROCEDURE — 99213 OFFICE O/P EST LOW 20 MIN: CPT | Performed by: NURSE PRACTITIONER

## 2022-09-24 RX ORDER — BENZONATATE 100 MG/1
100 CAPSULE ORAL 3 TIMES DAILY PRN
Qty: 60 CAPSULE | Refills: 0 | Status: SHIPPED | OUTPATIENT
Start: 2022-09-24 | End: 2023-02-28

## 2022-09-24 ASSESSMENT — ENCOUNTER SYMPTOMS
SHORTNESS OF BREATH: 0
VOMITING: 0
ABDOMINAL PAIN: 0
COUGH: 1
MYALGIAS: 0
CHILLS: 0
HEMOPTYSIS: 0
HEADACHES: 0
DIARRHEA: 0
FEVER: 0
NAUSEA: 0
SPUTUM PRODUCTION: 0
WHEEZING: 0

## 2022-09-24 ASSESSMENT — FIBROSIS 4 INDEX: FIB4 SCORE: 1.29

## 2022-09-24 NOTE — PROGRESS NOTES
"Subjective:     Rachele Madrigal is a 77 y.o. female who presents for Coronavirus Screening (Tested Positve this morning, cough, not able to sleep due to cough and phlegm. NECK PAIN.)      HPI  Pt presents for evaluation of a new problem. Rachele is a pleasant 77-year-old Mandarin speaking patient who comes to the clinic today with complaints of a persistent cough for the past 2 days.  She states she was unable to sleep last night due to her severe cough and phlegm and did test positive for COVID today.  She does have a slight headache but notes no fever, chills, nausea/vomiting/diarrhea or sore throat.  She is fully vaccinated and boosted.  This is her first COVID infection.     Review of Systems   Constitutional:  Positive for malaise/fatigue. Negative for chills and fever.   HENT:  Positive for congestion.    Respiratory:  Positive for cough. Negative for hemoptysis, sputum production, shortness of breath and wheezing.    Gastrointestinal:  Negative for abdominal pain, diarrhea, nausea and vomiting.   Musculoskeletal:  Negative for myalgias.   Neurological:  Negative for headaches.     PMH:   Past Medical History:   Diagnosis Date    Allergy      ALLERGIES: No Known Allergies  SURGHX:   Past Surgical History:   Procedure Laterality Date    TONSILLECTOMY  1952     SOCHX:   Social History     Socioeconomic History    Marital status:    Tobacco Use    Smoking status: Never    Smokeless tobacco: Never   Vaping Use    Vaping Use: Never used   Substance and Sexual Activity    Alcohol use: No    Drug use: No    Sexual activity: Never     FH:   Family History   Problem Relation Age of Onset    Other Father         kidney failure    Genetic Disorder Paternal Grandfather         liver problems         Objective:   /70   Pulse 82   Temp 36.3 °C (97.3 °F)   Resp 18   Ht 1.651 m (5' 5\")   Wt 55.8 kg (123 lb)   LMP  (LMP Unknown)   SpO2 95%   BMI 20.47 kg/m²     Physical Exam  Vitals and nursing note reviewed. "   Constitutional:       General: She is not in acute distress.     Appearance: Normal appearance. She is normal weight. She is not ill-appearing.   HENT:      Head: Normocephalic and atraumatic.      Right Ear: Tympanic membrane, ear canal and external ear normal. There is no impacted cerumen.      Left Ear: External ear normal. There is impacted cerumen.      Nose: No congestion or rhinorrhea.      Mouth/Throat:      Mouth: Mucous membranes are moist.      Pharynx: Posterior oropharyngeal erythema present. No oropharyngeal exudate.   Eyes:      Extraocular Movements: Extraocular movements intact.      Pupils: Pupils are equal, round, and reactive to light.   Cardiovascular:      Rate and Rhythm: Normal rate and regular rhythm.      Pulses: Normal pulses.      Heart sounds: Normal heart sounds.   Pulmonary:      Effort: Pulmonary effort is normal. No respiratory distress.      Breath sounds: Normal breath sounds. No stridor. No wheezing, rhonchi or rales.   Chest:      Chest wall: No tenderness.   Abdominal:      General: Abdomen is flat. Bowel sounds are normal.      Palpations: Abdomen is soft.      Tenderness: There is no abdominal tenderness. There is no right CVA tenderness or left CVA tenderness.   Musculoskeletal:         General: Normal range of motion.      Cervical back: Normal range of motion and neck supple. No tenderness.   Lymphadenopathy:      Cervical: No cervical adenopathy.   Skin:     General: Skin is warm and dry.      Capillary Refill: Capillary refill takes less than 2 seconds.   Neurological:      General: No focal deficit present.      Mental Status: She is alert and oriented to person, place, and time. Mental status is at baseline.   Psychiatric:         Mood and Affect: Mood normal.         Behavior: Behavior normal.         Thought Content: Thought content normal.         Judgment: Judgment normal.       Assessment/Plan:   Assessment    1. COVID-19 virus infection  Nirmatrelvir&Ritonavir  300/100 20 x 150 MG & 10 x 100MG Tablet Therapy Pack      2. Cough in adult  benzonatate (TESSALON) 100 MG Cap      An  was used for the duration of her visit today.  Due to her COVID infection she was prescribed Paxlovid for treatment.  Potential side effects discussed with patient.  Recent labs and stress no evidence of decreased GFR.  Medication verified and checked for interactions.  She was educated that this was emergently approved by the FDA and long-term side effects are unknown.  Risks versus benefits discussed.  Patient handout on medication given.  She was additionally prescribed benzonatate for treatment of cough. We discussed supportive measures including humidifier, warm salt water gargles, over-the-counter Cepacol throat lozenges, rest  and increased fluids. Pt was encouraged to seek treatment back in the ER or urgent care for worsening symptoms,  fever greater than 100.5, wheezes or shortness of breath.  Isolation guidelines of COVID discussed with patient.    AVS handout given and reviewed with patient. Pt educated on red flags and when to seek treatment back in ER or UC.

## 2022-09-30 ENCOUNTER — PHYSICAL THERAPY (OUTPATIENT)
Dept: PHYSICAL THERAPY | Facility: REHABILITATION | Age: 77
End: 2022-09-30
Attending: INTERNAL MEDICINE
Payer: MEDICARE

## 2022-09-30 DIAGNOSIS — M50.30 DEGENERATION OF CERVICAL INTERVERTEBRAL DISC: ICD-10-CM

## 2022-09-30 PROCEDURE — 97140 MANUAL THERAPY 1/> REGIONS: CPT

## 2022-09-30 NOTE — OP THERAPY DAILY TREATMENT
"  Outpatient Physical Therapy  DAILY TREATMENT     [facility] Physical [address].  [address]  [place] [address]  Phone:  [phone]  Fax:  [phone]    Date: 09/30/2022    Patient: Rachele Madrigal  YOB: 1945  MRN: [id number]     Time Calculation    Start time: 0950  Stop time: 1028 Time Calculation (min): 38 minutes         Chief Complaint: No chief complaint on file.    Visit #: 4    SUBJECTIVE:  Pt. States that her pain much improved. She feels it in the L neck sometimes; down the arm, post. Hand. Been abolished. She is now doing kitchen work, gripping tasks again.  MRI + for foraminal narrowing C5-6 and C6-7  PAIN  Pain rating (1-10) before treatment:  3  Pain rating (1-10) after treatment:  1  Location: 1. Neck post , refers to: post arm  2. arm  Descriptors: 1. Pain, sore  2. weakness   Aggravated with house work, settles with meds, pain patch,    OBJECTIVE:  Current objective measures: Active Range of Motion   C/S AROM  Left lateral flexion: 25 deg  Left rotation: 50          Therapeutic Exercises (CPT 32871):     1. seated L, R sidebend, 2z5, w/towel    2. seated L, R rotation, 2.5, w/towel    3. L UT stretch, 2x 20-30\"      Therapeutic Exercise Summary: Hip strengthen and stretch [[id number]]    NEck Side Bend with Towel Support -  Repeat 5 Times, Complete 2 Sets, Perform 2 Times a Day    Neck Rotation with Towel Support -  Repeat 5 Times, Complete 2 Sets, Perform 2 Times a Day    Neck Stretch with Towel -     Therapeutic Treatments and Modalities:     1. Manual Therapy (CPT 88432), C6 , C7 PA, LULPA G3-to 4  3 bouts ea., //relief of SH symptoms    2. Manual Therapy (CPT 91229), MHP to neck, post scapula, L elbow, during MT    3. Self Care ADL Training (CPT 88491)  Time-based treatments/modalities:    Physical Therapy Timed Treatment Charges  Manual therapy minutes (CPT 80147): 24 minutes  Therapeutic exercise minutes (CPT 22504): 8 minutes        ASSESSMENT:   Response to " treatment: responds well with inc. AROM  and no sx. Pt. Improving functionally w/tolerance to adls -kitchen work.    PLAN/RECOMMENDATIONS:   Plan for treatment: therapy treatment to continue next visit.  Planned interventions for next visit: continue with current treatment.

## 2022-10-06 ENCOUNTER — PHYSICAL THERAPY (OUTPATIENT)
Dept: PHYSICAL THERAPY | Facility: REHABILITATION | Age: 77
End: 2022-10-06
Attending: INTERNAL MEDICINE
Payer: MEDICARE

## 2022-10-06 DIAGNOSIS — M50.30 DDD (DEGENERATIVE DISC DISEASE), CERVICAL: ICD-10-CM

## 2022-10-06 PROCEDURE — 97535 SELF CARE MNGMENT TRAINING: CPT

## 2022-10-06 PROCEDURE — 97110 THERAPEUTIC EXERCISES: CPT

## 2022-10-06 NOTE — OP THERAPY DAILY TREATMENT
"  Outpatient Physical Therapy  DAILY TREATMENT     [facility] Physical [address].  [address]  [place] [address]  Phone:  [phone]  Fax:  [phone]    Date: 10/06/2022    Patient: Rachele Madrigal  YOB: 1945  MRN: [id number]     Time Calculation    Start time: 0900  Stop time: 0945 Time Calculation (min): 45 minutes       Chief Complaint: Arm Problem    Visit #: 5    SUBJECTIVE:  Pt. C/o residual N/T in finger tips and soreness in shoulder. She is trying to look up again, which feels good. She also admits that she does not use the L UE to lift objects (heavy pots, purse) can manage lighter objects (tea kettle)    Pain rating (1-10) before treatment:  1  Pain rating (1-10) after treatment:  1  Location: 1. Neck post , refers to: post arm  2. arm  Descriptors: 1. Pain, sore  2. weakness   Aggravated with house work, settles with meds, pain patch,  OBJECTIVE:  Current objective measures: C/S AROM  Left lateral flexion: 25 deg  Left rotation: 50      Strength (L UE)  WFL, exept RTC, supra, infra, T.minor mms    Therapeutic Exercises (CPT 66274):     1. seated L, R sidebend, 2z5, w/towel    2. seated L, R rotation, 2.5, w/towel    3. L UT stretch, 2x 20-30\"    4. Ball seated, f, side    5. ball SH F, up wall    6. Sh E, F, abd, ER, 10, PiTB    7. Chest press, 10, PitB      Therapeutic Exercise Summary: NEW **Shoulder PREs HEP1  [RPFACPR]    SHOULDER FLEXION STRETCH WITH BALL -  Repeat 10 Times, Hold 5 Seconds,    BILATERAL SHOULDER FLEXION WITH BAND -  Repeat 10 Times, Hold 1 Second(s), Complete 1 Set,    ELASTIC BAND EXTENSION BILATERAL SHOULDER -  Repeat 1 Time, Hold 1 Second(s), Complete 1 Set,    Shoulder Abduction - Theraband -  Repeat 10 Times, Hold 1 Second(s), Complete 1 Set,    Theraband External Rotation -  Repeat 10 Times, Hold 1 Second(s), Complete 1 Set,    PEC MINOR STRETCH -  Repeat 1 Time, Hold 1 Minute, Complete 1 Set,    Hip strengthen and stretch [[id number]]    NEck " Side Bend with Towel Support -  Repeat 5 Times, Complete 2 Sets, Perform 2 Times a Day    Neck Rotation with Towel Support -  Repeat 5 Times, Complete 2 Sets, Perform 2 Times a Day    Neck Stretch with Towel -     Therapeutic Treatments and Modalities:     1. Manual Therapy (CPT 43560), -    2. Manual Therapy (CPT 12489), -    3. Self Care ADL Training (CPT 48665), hep w/sets/reps  Time-based treatments/modalities:    Physical Therapy Timed Treatment Charges  Functional training, self care minutes (CPT 65974): 10 minutes  Manual therapy minutes (CPT 77893): 5 minutes  Therapeutic exercise minutes (CPT 30241): 25 minutes    ASSESSMENT:   Response to treatment: Ptl is pleased w/progress w/neck. Pt. Has weakness in the L SH, so added strengthening/stretching today.  good return demo of TE to carry out with handout at home    PLAN/RECOMMENDATIONS:   Plan for treatment: therapy treatment to continue next visit.  Planned interventions for next visit: continue with current treatment.

## 2022-10-13 ENCOUNTER — PHYSICAL THERAPY (OUTPATIENT)
Dept: PHYSICAL THERAPY | Facility: REHABILITATION | Age: 77
End: 2022-10-13
Attending: INTERNAL MEDICINE
Payer: MEDICARE

## 2022-10-13 DIAGNOSIS — M50.30 DDD (DEGENERATIVE DISC DISEASE), CERVICAL: ICD-10-CM

## 2022-10-13 PROCEDURE — 97110 THERAPEUTIC EXERCISES: CPT

## 2022-10-13 PROCEDURE — 97535 SELF CARE MNGMENT TRAINING: CPT

## 2022-10-13 NOTE — OP THERAPY DAILY TREATMENT
"  Outpatient Physical Therapy  DAILY TREATMENT     [facility] Physical [address]  [address]  [place] [address]  Phone:  [phone]  Fax:  [phone]    Date: 10/13/2022    Patient: Rachele Madrigal  YOB: 1945  MRN: [id number]     Time Calculation    Start time: 0900  Stop time: 0945 Time Calculation (min): 45 minutes         Chief Complaint: Neck Problem    Visit #: 6    SUBJECTIVE:  Pt. States she is doing the HEP 3x/wk. States \"I think I am good.\"  Pain rating (1-10) before treatment:  0  Pain rating (1-10) after treatment:  0  Location: 1. Neck post , refers to: -  Descriptors: 1.No Pain 2. weakness   Aggravated with house work - has not tried yet but will experiment    OBJECTIVE:  Current objective measures: C/S R,L bend 45 deg., L rotation: 50 deg. W/out pain  Returns demo of current HEP  Lifting technique: after training demonstrates good form and use of legs.          Therapeutic Exercises (CPT 28031):     1. seated L, R sidebend, 2x5, w/towel    2. seated L, R rotation, 2x5, w/towel    3. L UT stretch, 1x 20-30\"    4. Ball seated, -, hep    5. ball SH F, -, hep    6. Sh E, F, abd, ER, I w/HEp, PiTB    7. UBE F/B, L2 6'    8. standing pectoral, stretch, 3x10-30\"    9. standing Biceps,triceps, middle trap stretch, 2x 20-30\"    10. Lifting technique, 10x waist <> floor, 7# box, DEMO&VC for sequencing, using legs/squatting      Therapeutic Exercise Summary: *New* Stretching 2C  [[id number]]    Doorway pectoral stretch (3 position) -  Repeat 3 Times, Hold 20 Seconds, Perform 2 Times a Day    Pectoralis/Biceps/Wrist flexor Stretch -  Repeat 3 Times, Hold 20 Seconds, Complete 1 Set,    MIDDLE TRAPEZIUS STRETCH -  Repeat 3 Times, Hold 20 Seconds, Complete 1 Set,    TRICEP STRETCH -  Repeat 3 Times, Hold 20 Seconds, Complete 1 Set,    Shoulder PREs HEP1  [RPFACPR]    SHOULDER FLEXION STRETCH WITH BALL -  Repeat 10 Times, Hold 5 Seconds,    BILATERAL SHOULDER FLEXION WITH BAND -  " Repeat 10 Times, Hold 1 Second(s), Complete 1 Set,    ELASTIC BAND EXTENSION BILATERAL SHOULDER -  Repeat 1 Time, Hold 1 Second(s), Complete 1 Set,    Shoulder Abduction - Theraband -  Repeat 10 Times, Hold 1 Second(s), Complete 1 Set,    Theraband External Rotation -  Repeat 10 Times, Hold 1 Second(s), Complete 1 Set,    PEC MINOR STRETCH -  Repeat 1 Time, Hold 1 Minute, Complete 1 Set,    Hip strengthen and stretch [[id number]]    NEck Side Bend with Towel Support -  Repeat 5 Times, Complete 2 Sets, Perform 2 Times a Day    Neck Rotation with Towel Support -  Repeat 5 Times, Complete 2 Sets, Perform 2 Times a Day    Neck Stretch with Towel -       Therapeutic Treatments and Modalities:     1. Manual Therapy (CPT 93840), -    2. Manual Therapy (CPT 15596), -    3. Self Care ADL Training (CPT 71336), hep w/sets/reps  Time-based treatments/modalities:         ASSESSMENT:   Response to treatment: well. Pt. No longer has neck and arm pain.  She has a HEP of neck & arm exercises.  She demonstrates good lifting mechanics to carry out housework decreasing risk of re-injury.    Long Term Goals:    Inc. AROM LSB 25 and LROT 50 deg. - Met  Teaches back proper posture with transitional movements, lifting - Met  I HEP/ self care - Met    PLAN/RECOMMENDATIONS:   Plan for treatment: discharge patient due to accomplished goals.  Planned interventions for next visit:  none .

## 2022-10-13 NOTE — OP THERAPY DISCHARGE SUMMARY
Outpatient Physical Therapy  DISCHARGE SUMMARY NOTE      [facility] Physical Therapy [address].  [address]  [place] [address]  Phone:  [phone]  Fax:  [phone]    Date of Visit: 10/13/2022    Patient: Rachele Madrigal  YOB: 1945  MRN: [id number]     Referring Provider: Melly Steinberg M.D.  [address] [place] [address]   Referring Diagnosis Other cervical disc degeneration, unspecified cervical region [M50.30];Pain in left shoulder [M25.512];Other chronic pain [G89.29]       Your patient is being discharged from Physical Therapy with the following comments:   Goals met    Comments:    ASSESSMENT:   Pt. No longer has neck and arm pain.  She has a HEP of neck & arm exercises.  She demonstrates good lifting mechanics to carry out housework decreasing risk of re-injury.    Long Term Goals:    Inc. AROM LSB 25 and LROT 50 deg. - Met  Teaches back proper posture with transitional movements, lifting - Met  I HEP/ self care - Met     Recommendations:  Continue with HEP. Return to clinic if symptoms come back    Luzmaria Ricketts, PT    Date: 10/13/2022

## 2022-10-20 ENCOUNTER — OFFICE VISIT (OUTPATIENT)
Dept: MEDICAL GROUP | Age: 77
End: 2022-10-20
Payer: MEDICARE

## 2022-10-20 VITALS
RESPIRATION RATE: 16 BRPM | DIASTOLIC BLOOD PRESSURE: 62 MMHG | BODY MASS INDEX: 20.66 KG/M2 | TEMPERATURE: 96.5 F | OXYGEN SATURATION: 95 % | HEIGHT: 65 IN | SYSTOLIC BLOOD PRESSURE: 118 MMHG | HEART RATE: 75 BPM | WEIGHT: 124 LBS

## 2022-10-20 DIAGNOSIS — E78.2 MIXED HYPERLIPIDEMIA: ICD-10-CM

## 2022-10-20 DIAGNOSIS — M54.2 CHRONIC NECK PAIN: ICD-10-CM

## 2022-10-20 DIAGNOSIS — Z23 NEED FOR VACCINATION: ICD-10-CM

## 2022-10-20 DIAGNOSIS — G89.29 CHRONIC NECK PAIN: ICD-10-CM

## 2022-10-20 DIAGNOSIS — R73.01 IMPAIRED FASTING GLUCOSE: ICD-10-CM

## 2022-10-20 DIAGNOSIS — K21.00 GASTROESOPHAGEAL REFLUX DISEASE WITH ESOPHAGITIS WITHOUT HEMORRHAGE: ICD-10-CM

## 2022-10-20 DIAGNOSIS — I70.0 ATHEROSCLEROSIS OF ABDOMINAL AORTA (HCC): ICD-10-CM

## 2022-10-20 DIAGNOSIS — E55.9 VITAMIN D DEFICIENCY: ICD-10-CM

## 2022-10-20 PROBLEM — M25.512 CHRONIC LEFT SHOULDER PAIN: Status: RESOLVED | Noted: 2022-08-24 | Resolved: 2022-10-20

## 2022-10-20 PROCEDURE — G0008 ADMIN INFLUENZA VIRUS VAC: HCPCS | Performed by: INTERNAL MEDICINE

## 2022-10-20 PROCEDURE — 99214 OFFICE O/P EST MOD 30 MIN: CPT | Mod: 25 | Performed by: INTERNAL MEDICINE

## 2022-10-20 PROCEDURE — 90662 IIV NO PRSV INCREASED AG IM: CPT | Performed by: INTERNAL MEDICINE

## 2022-10-20 ASSESSMENT — ENCOUNTER SYMPTOMS
MUSCULOSKELETAL NEGATIVE: 1
CARDIOVASCULAR NEGATIVE: 1
EYES NEGATIVE: 1
RESPIRATORY NEGATIVE: 1
CONSTITUTIONAL NEGATIVE: 1
NEUROLOGICAL NEGATIVE: 1
PSYCHIATRIC NEGATIVE: 1
GASTROINTESTINAL NEGATIVE: 1

## 2022-10-20 ASSESSMENT — FIBROSIS 4 INDEX: FIB4 SCORE: 1.29

## 2022-10-20 NOTE — PROGRESS NOTES
Subjective     Rachele Madrigal is a 77 y.o. female who presents with Follow-Up (Neck pain states feeling better )  Patient here for follow-up of her chronic neck pain which has resolved with physical therapy 6 visits.  She feeling much better now.  She has been instructed on proper neck exercises.  Include strengthening and stretching and periodic applications of heat alternating with ice.  I have advised patient to discontinue all NSAIDs due to high risk of side effects including hypertension, kidney impairment, gastritis/PUD, upper GI bleed, and fluid retention.  Occasional usage once a week perhaps might be acceptable but still advised to use Tylenol instead.    And    The patient is here for followup of chronic medical problems listed below. The patient is compliant with medications and having no side effects from them. Denies chest pain, abdominal pain, dyspnea, myalgias, or cough.   Outpatient Medications Prior to Visit   Medication Sig Dispense Refill    gabapentin (NEURONTIN) 300 MG Cap Take 1 Capsule by mouth 3 times a day. Start with 300mg PO nightly, Day 2 start 300mg PO BID, Day 7 start 300mg PO TID, then increase each dose (one at a time) every 5 days by 300mg to 900mg TID maximum 90 Capsule 1    benzonatate (TESSALON) 100 MG Cap Take 1 Capsule by mouth 3 times a day as needed for Cough. 60 Capsule 0    omeprazole (PRILOSEC) 40 MG delayed-release capsule Take 1 capsule by mouth once daily 90 Capsule 4    Fenofibrate 134 MG Cap TAKE 1 CAPSULE BY MOUTH IN THE MORNING BEFORE BREAKFAST 90 Capsule 3    cyanocobalamin (VITAMIN B-12) 500 MCG Tab Take 1,250 mcg by mouth every day. Twice a week      vitamin e (VITAMIN E) 400 UNIT Cap Take 180 Units by mouth every day.      Cholecalciferol (VITAMIN D3) 5000 units Cap Take 1 Cap by mouth every day. 100 Cap 4    naphazoline-pheniramine (OPCON-A) 0.027-0.315 % SOLN Place 1 Drop in both eyes 3 times a day as needed.        Nirmatrelvir&Ritonavir 300/100 20 x 150 MG & 10 x  "100MG Tablet Therapy Pack Take 300 mg nirmatrelvir (two 150 mg tablets) with 100 mg ritonavir (one 100 mg tablet) by mouth, with all three tablets taken together twice daily for 5 days. (Patient not taking: Reported on 10/20/2022) 30 Each 0    meloxicam (MOBIC) 7.5 MG Tab Take 1 Tablet by mouth every day. 60 Tablet 0     No facility-administered medications prior to visit.     Patient Active Problem List   Diagnosis    Mixed hyperlipidemia- TG > 300    Impaired fasting glucose    Vitamin D deficiency disease    Gastroesophageal reflux disease with esophagitis- NEG EGD JUNE 2019    Age-related osteoporosis without current pathological fracture- dexa 2016; fosamax started    Fatty liver    DDD (degenerative disc disease), cervical    Atherosclerosis of abdominal aorta (HCC)               HPI    Review of Systems   Constitutional: Negative.    HENT: Negative.     Eyes: Negative.    Respiratory: Negative.     Cardiovascular: Negative.    Gastrointestinal: Negative.    Genitourinary: Negative.    Musculoskeletal: Negative.    Skin: Negative.    Neurological: Negative.    Endo/Heme/Allergies: Negative.    Psychiatric/Behavioral: Negative.              Objective     /62 (BP Location: Right arm, Patient Position: Sitting, BP Cuff Size: Adult)   Pulse 75   Temp 35.8 °C (96.5 °F) (Temporal)   Resp 16   Ht 1.651 m (5' 5\")   Wt 56.2 kg (124 lb)   LMP  (LMP Unknown)   SpO2 95%   BMI 20.63 kg/m²      Physical Exam  Vitals reviewed.   Constitutional:       General: She is not in acute distress.     Appearance: She is well-developed. She is not diaphoretic.   HENT:      Head: Normocephalic and atraumatic.      Right Ear: External ear normal.      Left Ear: External ear normal.      Nose: Nose normal.      Mouth/Throat:      Pharynx: No oropharyngeal exudate.   Eyes:      General: No scleral icterus.        Right eye: No discharge.         Left eye: No discharge.      Conjunctiva/sclera: Conjunctivae normal.      " Pupils: Pupils are equal, round, and reactive to light.   Neck:      Thyroid: No thyromegaly.      Vascular: No JVD.      Trachea: No tracheal deviation.   Cardiovascular:      Rate and Rhythm: Normal rate and regular rhythm.      Heart sounds: Normal heart sounds. No murmur heard.    No friction rub. No gallop.   Pulmonary:      Effort: Pulmonary effort is normal. No respiratory distress.      Breath sounds: Normal breath sounds. No stridor. No wheezing or rales.   Chest:      Chest wall: No tenderness.   Abdominal:      General: Bowel sounds are normal. There is no distension.      Palpations: Abdomen is soft. There is no mass.      Tenderness: There is no abdominal tenderness. There is no guarding or rebound.   Musculoskeletal:         General: No tenderness. Normal range of motion.      Cervical back: Normal range of motion and neck supple.   Lymphadenopathy:      Cervical: No cervical adenopathy.   Skin:     General: Skin is warm and dry.      Coloration: Skin is not pale.      Findings: No erythema or rash.   Neurological:      Mental Status: She is alert and oriented to person, place, and time.      Cranial Nerves: No cranial nerve deficit.      Motor: No abnormal muscle tone.      Coordination: Coordination normal.      Deep Tendon Reflexes: Reflexes are normal and symmetric. Reflexes normal.   Psychiatric:         Behavior: Behavior normal.         Thought Content: Thought content normal.         Judgment: Judgment normal.   No visits with results within 1 Month(s) from this visit.   Latest known visit with results is:   Hospital Outpatient Visit on 10/04/2021   Component Date Value    Folate -Folic Acid 10/04/2021 15.4     Vitamin B12 -True Cobala* 10/04/2021 1410 (A)     25-Hydroxy   Vitamin D 25 10/04/2021 77     Glycohemoglobin 10/04/2021 6.1 (A)     Est Avg Glucose 10/04/2021 128     WBC 10/04/2021 4.4 (A)     RBC 10/04/2021 4.39     Hemoglobin 10/04/2021 14.0     Hematocrit 10/04/2021 42.1     MCV  10/04/2021 95.9     MCH 10/04/2021 31.9     MCHC 10/04/2021 33.3 (A)     RDW 10/04/2021 41.8     Platelet Count 10/04/2021 243     MPV 10/04/2021 11.2     Neutrophils-Polys 10/04/2021 54.50     Lymphocytes 10/04/2021 37.90     Monocytes 10/04/2021 5.30     Eosinophils 10/04/2021 1.40     Basophils 10/04/2021 0.70     Immature Granulocytes 10/04/2021 0.20     Nucleated RBC 10/04/2021 0.00     Neutrophils (Absolute) 10/04/2021 2.37     Lymphs (Absolute) 10/04/2021 1.65     Monos (Absolute) 10/04/2021 0.23     Eos (Absolute) 10/04/2021 0.06     Baso (Absolute) 10/04/2021 0.03     Immature Granulocytes (a* 10/04/2021 0.01     NRBC (Absolute) 10/04/2021 0.00     Cholesterol,Tot 10/04/2021 217 (A)     Triglycerides 10/04/2021 144     HDL 10/04/2021 47     LDL 10/04/2021 141 (A)     Sodium 10/04/2021 142     Potassium 10/04/2021 4.0     Chloride 10/04/2021 105     Co2 10/04/2021 26     Anion Gap 10/04/2021 11.0     Glucose 10/04/2021 112 (A)     Bun 10/04/2021 23 (A)     Creatinine 10/04/2021 0.81     Calcium 10/04/2021 9.7     AST(SGOT) 10/04/2021 16     ALT(SGPT) 10/04/2021 15     Alkaline Phosphatase 10/04/2021 64     Total Bilirubin 10/04/2021 0.8     Albumin 10/04/2021 4.9     Total Protein 10/04/2021 7.6     Globulin 10/04/2021 2.7     A-G Ratio 10/04/2021 1.8     TSH 10/04/2021 1.420     Hepatitis C Antibody 10/04/2021 Non-Reactive     Fasting Status 10/04/2021 Fasting     GFR If African American 10/04/2021 >60     GFR If Non  Ameri* 10/04/2021 >60       Lab Results   Component Value Date/Time    HBA1C 6.1 (H) 10/04/2021 08:06 AM    HBA1C 6.3 (H) 10/31/2020 08:49 AM     Lab Results   Component Value Date/Time    SODIUM 142 10/04/2021 08:06 AM    POTASSIUM 4.0 10/04/2021 08:06 AM    CHLORIDE 105 10/04/2021 08:06 AM    CO2 26 10/04/2021 08:06 AM    GLUCOSE 112 (H) 10/04/2021 08:06 AM    BUN 23 (H) 10/04/2021 08:06 AM    CREATININE 0.81 10/04/2021 08:06 AM    ALKPHOSPHAT 64 10/04/2021 08:06 AM    ASTSGOT 16  10/04/2021 08:06 AM    ALTSGPT 15 10/04/2021 08:06 AM    TBILIRUBIN 0.8 10/04/2021 08:06 AM     No results found for: INR  Lab Results   Component Value Date/Time    CHOLSTRLTOT 217 (H) 10/04/2021 08:06 AM     (H) 10/04/2021 08:06 AM    HDL 47 10/04/2021 08:06 AM    TRIGLYCERIDE 144 10/04/2021 08:06 AM       No results found for: TESTOSTERONE  No results found for: TSH  Lab Results   Component Value Date/Time    FREET4 0.93 10/14/2013 08:48 AM    FREET4 0.78 06/15/2012 07:11 AM     No results found for: URICACID  No components found for: VITB12  Lab Results   Component Value Date/Time    25HYDROXY 77 10/04/2021 08:06 AM    25HYDROXY 50 10/31/2020 08:49 AM                             Assessment & Plan        1. Chronic neck pain  Resolved physical therapy.  Continue his strengthening stretching heat ice and Tylenol as needed.  Avoid NSAIDs    2. Mixed hyperlipidemia- TG > 300  Good control continue current regimen with fenofibrate.    3. Vitamin D deficiency disease  Controlled continue vitamin D supplements 5000 units daily.  Need to check level to document no overtreatment.    4. Impaired fasting glucose  Controlled continue monitoring diet and exercise.    5. Atherosclerosis of abdominal aorta (HCC)  Good control continue current regimen    6. Gastroesophageal reflux disease with esophagitis without hemorrhage  Good control continue PPI.    7. Need for vaccination     - Influenza Vaccine, High Dose (65+ Only)

## 2022-10-27 ENCOUNTER — APPOINTMENT (OUTPATIENT)
Dept: PHYSICAL THERAPY | Facility: REHABILITATION | Age: 77
End: 2022-10-27
Attending: INTERNAL MEDICINE
Payer: MEDICARE

## 2023-02-14 ENCOUNTER — HOSPITAL ENCOUNTER (OUTPATIENT)
Dept: LAB | Facility: MEDICAL CENTER | Age: 78
End: 2023-02-14
Attending: INTERNAL MEDICINE
Payer: MEDICARE

## 2023-02-14 LAB
25(OH)D3 SERPL-MCNC: 74 NG/ML (ref 30–100)
ALBUMIN SERPL BCP-MCNC: 4.6 G/DL (ref 3.2–4.9)
ALBUMIN/GLOB SERPL: 1.8 G/DL
ALP SERPL-CCNC: 62 U/L (ref 30–99)
ALT SERPL-CCNC: 17 U/L (ref 2–50)
ANION GAP SERPL CALC-SCNC: 12 MMOL/L (ref 7–16)
AST SERPL-CCNC: 17 U/L (ref 12–45)
BASOPHILS # BLD AUTO: 0.5 % (ref 0–1.8)
BASOPHILS # BLD: 0.02 K/UL (ref 0–0.12)
BILIRUB SERPL-MCNC: 1 MG/DL (ref 0.1–1.5)
BUN SERPL-MCNC: 25 MG/DL (ref 8–22)
CALCIUM ALBUM COR SERPL-MCNC: 9.1 MG/DL (ref 8.5–10.5)
CALCIUM SERPL-MCNC: 9.6 MG/DL (ref 8.5–10.5)
CHLORIDE SERPL-SCNC: 102 MMOL/L (ref 96–112)
CHOLEST SERPL-MCNC: 212 MG/DL (ref 100–199)
CO2 SERPL-SCNC: 24 MMOL/L (ref 20–33)
CREAT SERPL-MCNC: 0.87 MG/DL (ref 0.5–1.4)
EOSINOPHIL # BLD AUTO: 0.05 K/UL (ref 0–0.51)
EOSINOPHIL NFR BLD: 1.2 % (ref 0–6.9)
ERYTHROCYTE [DISTWIDTH] IN BLOOD BY AUTOMATED COUNT: 41.8 FL (ref 35.9–50)
FASTING STATUS PATIENT QL REPORTED: NORMAL
GFR SERPLBLD CREATININE-BSD FMLA CKD-EPI: 69 ML/MIN/1.73 M 2
GLOBULIN SER CALC-MCNC: 2.5 G/DL (ref 1.9–3.5)
GLUCOSE SERPL-MCNC: 113 MG/DL (ref 65–99)
HCT VFR BLD AUTO: 41.5 % (ref 37–47)
HDLC SERPL-MCNC: 45 MG/DL
HGB BLD-MCNC: 13.7 G/DL (ref 12–16)
IMM GRANULOCYTES # BLD AUTO: 0.02 K/UL (ref 0–0.11)
IMM GRANULOCYTES NFR BLD AUTO: 0.5 % (ref 0–0.9)
LDLC SERPL CALC-MCNC: 136 MG/DL
LYMPHOCYTES # BLD AUTO: 1.44 K/UL (ref 1–4.8)
LYMPHOCYTES NFR BLD: 35.2 % (ref 22–41)
MCH RBC QN AUTO: 31.3 PG (ref 27–33)
MCHC RBC AUTO-ENTMCNC: 33 G/DL (ref 33.6–35)
MCV RBC AUTO: 94.7 FL (ref 81.4–97.8)
MONOCYTES # BLD AUTO: 0.27 K/UL (ref 0–0.85)
MONOCYTES NFR BLD AUTO: 6.6 % (ref 0–13.4)
NEUTROPHILS # BLD AUTO: 2.29 K/UL (ref 2–7.15)
NEUTROPHILS NFR BLD: 56 % (ref 44–72)
NRBC # BLD AUTO: 0 K/UL
NRBC BLD-RTO: 0 /100 WBC
PLATELET # BLD AUTO: 236 K/UL (ref 164–446)
PMV BLD AUTO: 11.3 FL (ref 9–12.9)
POTASSIUM SERPL-SCNC: 4 MMOL/L (ref 3.6–5.5)
PROT SERPL-MCNC: 7.1 G/DL (ref 6–8.2)
RBC # BLD AUTO: 4.38 M/UL (ref 4.2–5.4)
SODIUM SERPL-SCNC: 138 MMOL/L (ref 135–145)
TRIGL SERPL-MCNC: 154 MG/DL (ref 0–149)
TSH SERPL DL<=0.005 MIU/L-ACNC: 1.97 UIU/ML (ref 0.38–5.33)
VIT B12 SERPL-MCNC: 780 PG/ML (ref 211–911)
WBC # BLD AUTO: 4.1 K/UL (ref 4.8–10.8)

## 2023-02-14 PROCEDURE — 80053 COMPREHEN METABOLIC PANEL: CPT

## 2023-02-14 PROCEDURE — 80061 LIPID PANEL: CPT

## 2023-02-14 PROCEDURE — 84443 ASSAY THYROID STIM HORMONE: CPT

## 2023-02-14 PROCEDURE — 82306 VITAMIN D 25 HYDROXY: CPT

## 2023-02-14 PROCEDURE — 82607 VITAMIN B-12: CPT

## 2023-02-14 PROCEDURE — 36415 COLL VENOUS BLD VENIPUNCTURE: CPT

## 2023-02-14 PROCEDURE — 83036 HEMOGLOBIN GLYCOSYLATED A1C: CPT

## 2023-02-14 PROCEDURE — 85025 COMPLETE CBC W/AUTO DIFF WBC: CPT

## 2023-02-16 LAB
EST. AVERAGE GLUCOSE BLD GHB EST-MCNC: 140 MG/DL
HBA1C MFR BLD: 6.5 % (ref 4–5.6)

## 2023-02-28 ENCOUNTER — OFFICE VISIT (OUTPATIENT)
Dept: MEDICAL GROUP | Age: 78
End: 2023-02-28
Payer: MEDICARE

## 2023-02-28 VITALS
WEIGHT: 132 LBS | SYSTOLIC BLOOD PRESSURE: 120 MMHG | OXYGEN SATURATION: 95 % | BODY MASS INDEX: 21.99 KG/M2 | HEART RATE: 75 BPM | HEIGHT: 65 IN | DIASTOLIC BLOOD PRESSURE: 68 MMHG | TEMPERATURE: 97.9 F

## 2023-02-28 DIAGNOSIS — K21.00 GASTROESOPHAGEAL REFLUX DISEASE WITH ESOPHAGITIS WITHOUT HEMORRHAGE: ICD-10-CM

## 2023-02-28 DIAGNOSIS — K76.0 FATTY LIVER: ICD-10-CM

## 2023-02-28 DIAGNOSIS — I70.0 ATHEROSCLEROSIS OF ABDOMINAL AORTA (HCC): ICD-10-CM

## 2023-02-28 DIAGNOSIS — E78.2 MIXED HYPERLIPIDEMIA: ICD-10-CM

## 2023-02-28 DIAGNOSIS — R73.01 IMPAIRED FASTING GLUCOSE: ICD-10-CM

## 2023-02-28 DIAGNOSIS — D48.5 NEOPLASM OF UNCERTAIN BEHAVIOR OF SKIN OF NECK: ICD-10-CM

## 2023-02-28 DIAGNOSIS — E55.9 VITAMIN D DEFICIENCY: ICD-10-CM

## 2023-02-28 PROCEDURE — 99214 OFFICE O/P EST MOD 30 MIN: CPT | Performed by: INTERNAL MEDICINE

## 2023-02-28 RX ORDER — CLOBETASOL PROPIONATE 0.5 MG/G
1 CREAM TOPICAL 2 TIMES DAILY
Qty: 60 G | Refills: 1 | Status: SHIPPED | OUTPATIENT
Start: 2023-02-28 | End: 2023-05-31 | Stop reason: SDUPTHER

## 2023-02-28 RX ORDER — ROSUVASTATIN CALCIUM 5 MG/1
5 TABLET, COATED ORAL EVERY EVENING
Qty: 30 TABLET | Refills: 11 | Status: SHIPPED | OUTPATIENT
Start: 2023-02-28 | End: 2023-05-31 | Stop reason: SDUPTHER

## 2023-02-28 ASSESSMENT — ENCOUNTER SYMPTOMS
CARDIOVASCULAR NEGATIVE: 1
PSYCHIATRIC NEGATIVE: 1
EYES NEGATIVE: 1
GASTROINTESTINAL NEGATIVE: 1
RESPIRATORY NEGATIVE: 1
NEUROLOGICAL NEGATIVE: 1
CONSTITUTIONAL NEGATIVE: 1
MUSCULOSKELETAL NEGATIVE: 1

## 2023-02-28 ASSESSMENT — FIBROSIS 4 INDEX: FIB4 SCORE: 1.35

## 2023-02-28 ASSESSMENT — PATIENT HEALTH QUESTIONNAIRE - PHQ9: CLINICAL INTERPRETATION OF PHQ2 SCORE: 0

## 2023-03-01 NOTE — PROGRESS NOTES
Subjective     Rachele Madrigal is a 78 y.o. female who presents with Follow-Up (Lab review )  The patient is here for followup of chronic medical problems listed below. The patient is compliant with medications and having no side effects from them. Denies chest pain, abdominal pain, dyspnea, myalgias, or cough.   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 Start with 300mg PO nightly, Day 2 start 300mg PO BID, Day 7 start 300mg PO TID, then increase each dose (one at a time) every 5 days by 300mg to 900mg TID maximum 90 Capsule 1    omeprazole (PRILOSEC) 40 MG delayed-release capsule Take 1 capsule by mouth once daily 90 Capsule 4    cyanocobalamin (VITAMIN B-12) 500 MCG Tab Take 1,250 mcg by mouth every day. Twice a week      vitamin e (VITAMIN E) 400 UNIT Cap Take 180 Units by mouth every day.      Cholecalciferol (VITAMIN D3) 5000 units Cap Take 1 Cap by mouth every day. 100 Cap 4    naphazoline-pheniramine (OPCON-A) 0.027-0.315 % SOLN Place 1 Drop in both eyes 3 times a day as needed.        benzonatate (TESSALON) 100 MG Cap Take 1 Capsule by mouth 3 times a day as needed for Cough. (Patient not taking: Reported on 2/28/2023) 60 Capsule 0     No facility-administered medications prior to visit.     No Known  [facility] Outpatient Visit on 02/14/2023   Component Date Value    Cholesterol,Tot 02/14/2023 212 (H)     Triglycerides 02/14/2023 154 (H)     HDL 02/14/2023 45     LDL 02/14/2023 136 (H)     Fasting Status 02/14/2023 Fasting     Sodium 02/14/2023 138     Potassium 02/14/2023 4.0     Chloride 02/14/2023 102     Co2 02/14/2023 24     Anion Gap 02/14/2023 12.0     Glucose 02/14/2023 113 (H)     Bun 02/14/2023 25 (H)     Creatinine 02/14/2023 0.87     Calcium 02/14/2023 9.6     AST(SGOT) 02/14/2023 17     ALT(SGPT) 02/14/2023 17     Alkaline Phosphatase 02/14/2023 62     Total Bilirubin 02/14/2023 1.0     Albumin 02/14/2023 4.6     Total Protein 02/14/2023 7.1     Globulin 02/14/2023 2.5     A-G Ratio 02/14/2023 1.8     TSH 02/14/2023 1.970     WBC 02/14/2023 4.1 (L)     RBC 02/14/2023 4.38     Hemoglobin 02/14/2023 13.7     Hematocrit 02/14/2023 41.5     MCV 02/14/2023 94.7     MCH 02/14/2023 31.3     MCHC 02/14/2023 33.0 (L)     RDW 02/14/2023 41.8     Platelet Count 02/14/2023 236     MPV 02/14/2023 11.3     Neutrophils-Polys 02/14/2023 56.00     Lymphocytes 02/14/2023 35.20     Monocytes 02/14/2023 6.60     Eosinophils 02/14/2023 1.20     Basophils 02/14/2023 0.50     Immature Granulocytes 02/14/2023 0.50     Nucleated RBC 02/14/2023 0.00     Neutrophils (Absolute) 02/14/2023 2.29     Lymphs (Absolute) 02/14/2023 1.44     Monos (Absolute) 02/14/2023 0.27     Eos (Absolute) 02/14/2023 0.05     Baso (Absolute) 02/14/2023 0.02     Immature Granulocytes (a* 02/14/2023 0.02     NRBC (Absolute) 02/14/2023 0.00     Glycohemoglobin 02/14/2023 6.5 (H)     Est Avg Glucose 02/14/2023 140     25-Hydroxy   Vitamin D 25 02/14/2023 74     Vitamin B12 -True Cobala* 02/14/2023 780     Correct Calcium 02/14/2023 9.1     GFR (CKD-EPI) 02/14/2023 69           .lll  Lab Results   Component Value Date/Time    HBA1C 6.5 (H) 02/14/2023 07:54 AM    HBA1C 6.1 (H) 10/04/2021 08:06 AM     Lab Results   Component Value Date/Time     "SODIUM 138 02/14/2023 07:54 AM    POTASSIUM 4.0 02/14/2023 07:54 AM    CHLORIDE 102 02/14/2023 07:54 AM    CO2 24 02/14/2023 07:54 AM    GLUCOSE 113 (H) 02/14/2023 07:54 AM    BUN 25 (H) 02/14/2023 07:54 AM    CREATININE 0.87 02/14/2023 07:54 AM    ALKPHOSPHAT 62 02/14/2023 07:54 AM    ASTSGOT 17 02/14/2023 07:54 AM    ALTSGPT 17 02/14/2023 07:54 AM    TBILIRUBIN 1.0 02/14/2023 07:54 AM     No results found for: INR  Lab Results   Component Value Date/Time    CHOLSTRLTOT 212 (H) 02/14/2023 07:54 AM     (H) 02/14/2023 07:54 AM    HDL 45 02/14/2023 07:54 AM    TRIGLYCERIDE 154 (H) 02/14/2023 07:54 AM       No results found for: TESTOSTERONE  No results found for: TSH  Lab Results   Component Value Date/Time    FREET4 0.93 10/14/2013 08:48 AM    FREET4 0.78 06/15/2012 07:11 AM     No results found for: URICACID  No components found for: VITB12  Lab Results   Component Value Date/Time    25HYDROXY 74 02/14/2023 07:54 AM    25HYDROXY 77 10/04/2021 08:06 AM                   HPI    Review of Systems   Constitutional: Negative.    HENT: Negative.     Eyes: Negative.    Respiratory: Negative.     Cardiovascular: Negative.    Gastrointestinal: Negative.    Genitourinary: Negative.    Musculoskeletal: Negative.    Skin: Negative.    Neurological: Negative.    Endo/Heme/Allergies: Negative.    Psychiatric/Behavioral: Negative.              Objective     /68 (BP Location: Right arm, Patient Position: Sitting, BP Cuff Size: Adult)   Pulse 75   Temp 36.6 °C (97.9 °F) (Temporal)   Ht 1.651 m (5' 5\")   Wt 59.9 kg (132 lb)   LMP  (LMP Unknown)   SpO2 95%   BMI 21.97 kg/m²      Physical Exam  Vitals reviewed.   Constitutional:       General: She is not in acute distress.     Appearance: She is well-developed. She is not diaphoretic.   HENT:      Head: Normocephalic and atraumatic.      Right Ear: External ear normal.      Left Ear: External ear normal.      Nose: Nose normal.      Mouth/Throat:      Pharynx: No " oropharyngeal exudate.   Eyes:      General: No scleral icterus.        Right eye: No discharge.         Left eye: No discharge.      Conjunctiva/sclera: Conjunctivae normal.      Pupils: Pupils are equal, round, and reactive to light.   Neck:      Thyroid: No thyromegaly.      Vascular: No JVD.      Trachea: No tracheal deviation.   Cardiovascular:      Rate and Rhythm: Normal rate and regular rhythm.      Heart sounds: Normal heart sounds. No murmur heard.    No friction rub. No gallop.   Pulmonary:      Effort: Pulmonary effort is normal. No respiratory distress.      Breath sounds: Normal breath sounds. No stridor. No wheezing or rales.   Chest:      Chest wall: No tenderness.   Abdominal:      General: Bowel sounds are normal. There is no distension.      Palpations: Abdomen is soft. There is no mass.      Tenderness: There is no abdominal tenderness. There is no guarding or rebound.   Musculoskeletal:         General: No tenderness. Normal range of motion.      Cervical back: Normal range of motion and neck supple.   Lymphadenopathy:      Cervical: No cervical adenopathy.   Skin:     General: Skin is warm and dry.      Coloration: Skin is not pale.      Findings: No erythema or rash.   Neurological:      Mental Status: She is alert and oriented to person, place, and time.      Cranial Nerves: No cranial nerve deficit.      Motor: No abnormal muscle tone.      Coordination: Coordination normal.      Deep Tendon Reflexes: Reflexes are normal and symmetric. Reflexes normal.   Psychiatric:         Behavior: Behavior normal.         Thought Content: Thought content normal.         Judgment: Judgment normal.                            Assessment & Plan        1. Mixed hyperlipidemia- TG > 300  Not at goal.  Continue fenofibrate 134 mg daily but add to this rosuvastatin 5 mg daily and recheck in 3 months with lab prior to visit.  - rosuvastatin (CRESTOR) 5 MG Tab; Take 1 Tablet by mouth every evening.  Dispense: 30  Tablet; Refill: 11  - Lipid Profile; Future  - Comp Metabolic Panel; Future    2. Gastroesophageal reflux disease with esophagitis without hemorrhage  Under good control continue omeprazole 40 mg daily.    3. Impaired fasting glucose  Under good control continue Mediterranean diet and exercise.    4. Vitamin D deficiency disease  Under good control.  Continue vitamin D3, but reduce it to 2000 units daily.    5. Atherosclerosis of abdominal aorta (HCC)  Needs better control of lipids.  See above.  Statin added to fibrate.    6. Fatty liver  Mediterranean diet and exercise.  Better control of triglycerides and cholesterol should improve this.  High-protein low-carb diet.    7. 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  The raised macular papular oval lesions that could be a cutaneous lymphoma or other malignancy.  Needs biopsy.  Could be a simple fibroma, but biopsy indicated to determine etiology.  Trial of steroids at patient's request prescribed.  - Referral to Dermatology  - clobetasol (TEMOVATE) 0.05 % Cream; Apply 1 Application topically 2 times a day.  Dispense: 60 g; Refill: 1

## 2023-05-25 ENCOUNTER — HOSPITAL ENCOUNTER (OUTPATIENT)
Dept: LAB | Facility: MEDICAL CENTER | Age: 78
End: 2023-05-25
Attending: INTERNAL MEDICINE
Payer: MEDICARE

## 2023-05-25 DIAGNOSIS — E78.2 MIXED HYPERLIPIDEMIA: ICD-10-CM

## 2023-05-25 LAB
ALBUMIN SERPL BCP-MCNC: 4.7 G/DL (ref 3.2–4.9)
ALBUMIN/GLOB SERPL: 1.7 G/DL
ALP SERPL-CCNC: 69 U/L (ref 30–99)
ALT SERPL-CCNC: 14 U/L (ref 2–50)
ANION GAP SERPL CALC-SCNC: 12 MMOL/L (ref 7–16)
AST SERPL-CCNC: 18 U/L (ref 12–45)
BILIRUB SERPL-MCNC: 1 MG/DL (ref 0.1–1.5)
BUN SERPL-MCNC: 21 MG/DL (ref 8–22)
CALCIUM ALBUM COR SERPL-MCNC: 9.1 MG/DL (ref 8.5–10.5)
CALCIUM SERPL-MCNC: 9.7 MG/DL (ref 8.5–10.5)
CHLORIDE SERPL-SCNC: 105 MMOL/L (ref 96–112)
CHOLEST SERPL-MCNC: 148 MG/DL (ref 100–199)
CO2 SERPL-SCNC: 26 MMOL/L (ref 20–33)
CREAT SERPL-MCNC: 0.89 MG/DL (ref 0.5–1.4)
GFR SERPLBLD CREATININE-BSD FMLA CKD-EPI: 67 ML/MIN/1.73 M 2
GLOBULIN SER CALC-MCNC: 2.7 G/DL (ref 1.9–3.5)
GLUCOSE SERPL-MCNC: 120 MG/DL (ref 65–99)
HDLC SERPL-MCNC: 45 MG/DL
LDLC SERPL CALC-MCNC: 69 MG/DL
POTASSIUM SERPL-SCNC: 4.3 MMOL/L (ref 3.6–5.5)
PROT SERPL-MCNC: 7.4 G/DL (ref 6–8.2)
SODIUM SERPL-SCNC: 143 MMOL/L (ref 135–145)
TRIGL SERPL-MCNC: 172 MG/DL (ref 0–149)

## 2023-05-25 PROCEDURE — 36415 COLL VENOUS BLD VENIPUNCTURE: CPT

## 2023-05-25 PROCEDURE — 80061 LIPID PANEL: CPT

## 2023-05-25 PROCEDURE — 80053 COMPREHEN METABOLIC PANEL: CPT

## 2023-05-31 ENCOUNTER — OFFICE VISIT (OUTPATIENT)
Dept: MEDICAL GROUP | Age: 78
End: 2023-05-31
Payer: MEDICARE

## 2023-05-31 VITALS
SYSTOLIC BLOOD PRESSURE: 116 MMHG | HEART RATE: 74 BPM | WEIGHT: 128 LBS | TEMPERATURE: 97.9 F | DIASTOLIC BLOOD PRESSURE: 70 MMHG | OXYGEN SATURATION: 96 % | BODY MASS INDEX: 21.33 KG/M2 | HEIGHT: 65 IN

## 2023-05-31 DIAGNOSIS — E78.2 MIXED HYPERLIPIDEMIA: ICD-10-CM

## 2023-05-31 DIAGNOSIS — R73.01 IFG (IMPAIRED FASTING GLUCOSE): ICD-10-CM

## 2023-05-31 DIAGNOSIS — Z78.0 POSTMENOPAUSAL STATUS (AGE-RELATED) (NATURAL): ICD-10-CM

## 2023-05-31 DIAGNOSIS — E55.9 VITAMIN D DEFICIENCY: ICD-10-CM

## 2023-05-31 DIAGNOSIS — Z23 NEED FOR VACCINATION: ICD-10-CM

## 2023-05-31 DIAGNOSIS — N95.1 MENOPAUSAL STATE: ICD-10-CM

## 2023-05-31 DIAGNOSIS — K21.00 GASTROESOPHAGEAL REFLUX DISEASE WITH ESOPHAGITIS: ICD-10-CM

## 2023-05-31 DIAGNOSIS — D48.5 NEOPLASM OF UNCERTAIN BEHAVIOR OF SKIN OF NECK: ICD-10-CM

## 2023-05-31 PROCEDURE — 3074F SYST BP LT 130 MM HG: CPT | Performed by: INTERNAL MEDICINE

## 2023-05-31 PROCEDURE — 3078F DIAST BP <80 MM HG: CPT | Performed by: INTERNAL MEDICINE

## 2023-05-31 PROCEDURE — 99214 OFFICE O/P EST MOD 30 MIN: CPT | Performed by: INTERNAL MEDICINE

## 2023-05-31 RX ORDER — CLOBETASOL PROPIONATE 0.5 MG/G
1 CREAM TOPICAL 2 TIMES DAILY
Qty: 60 G | Refills: 1 | Status: SHIPPED | OUTPATIENT
Start: 2023-05-31

## 2023-05-31 RX ORDER — ROSUVASTATIN CALCIUM 5 MG/1
5 TABLET, COATED ORAL EVERY EVENING
Qty: 90 TABLET | Refills: 3 | Status: SHIPPED | OUTPATIENT
Start: 2023-05-31 | End: 2023-09-28 | Stop reason: SDUPTHER

## 2023-05-31 RX ORDER — OMEPRAZOLE 40 MG/1
40 CAPSULE, DELAYED RELEASE ORAL DAILY
Qty: 90 CAPSULE | Refills: 3 | Status: SHIPPED | OUTPATIENT
Start: 2023-05-31 | End: 2023-08-29

## 2023-05-31 RX ORDER — FENOFIBRATE 134 MG/1
1 CAPSULE ORAL
Qty: 90 CAPSULE | Refills: 3 | Status: SHIPPED | OUTPATIENT
Start: 2023-05-31 | End: 2023-09-28 | Stop reason: SDUPTHER

## 2023-05-31 ASSESSMENT — ENCOUNTER SYMPTOMS
PSYCHIATRIC NEGATIVE: 1
GASTROINTESTINAL NEGATIVE: 1
CONSTITUTIONAL NEGATIVE: 1
RESPIRATORY NEGATIVE: 1
CARDIOVASCULAR NEGATIVE: 1
EYES NEGATIVE: 1
MUSCULOSKELETAL NEGATIVE: 1
NEUROLOGICAL NEGATIVE: 1

## 2023-05-31 ASSESSMENT — FIBROSIS 4 INDEX: FIB4 SCORE: 1.57

## 2023-06-01 NOTE — PROGRESS NOTES
Charli Madrigal is a 78 y.o. female who presents with Follow-Up (Lab review )  The patient is here for followup of chronic medical problems listed below. The patient is compliant with medications and having no side effects from them. Denies chest pain, abdominal pain, dyspnea, myalgias, or cough.   No problems updated.  No Known Allergies  Hospital Outpatient Visit on 05/25/2023   Component Date Value    Sodium 05/25/2023 143     Potassium 05/25/2023 4.3     Chloride 05/25/2023 105     Co2 05/25/2023 26     Anion Gap 05/25/2023 12.0     Glucose 05/25/2023 120 (H)     Bun 05/25/2023 21     Creatinine 05/25/2023 0.89     Calcium 05/25/2023 9.7     AST(SGOT) 05/25/2023 18     ALT(SGPT) 05/25/2023 14     Alkaline Phosphatase 05/25/2023 69     Total Bilirubin 05/25/2023 1.0     Albumin 05/25/2023 4.7     Total Protein 05/25/2023 7.4     Globulin 05/25/2023 2.7     A-G Ratio 05/25/2023 1.7     Cholesterol,Tot 05/25/2023 148     Triglycerides 05/25/2023 172 (H)     HDL 05/25/2023 45     LDL 05/25/2023 69     Correct Calcium 05/25/2023 9.1     GFR (CKD-EPI) 05/25/2023 67       .alll  Lab Results   Component Value Date/Time    HBA1C 6.5 (H) 02/14/2023 07:54 AM    HBA1C 6.1 (H) 10/04/2021 08:06 AM     Lab Results   Component Value Date/Time    SODIUM 143 05/25/2023 07:29 AM    POTASSIUM 4.3 05/25/2023 07:29 AM    CHLORIDE 105 05/25/2023 07:29 AM    CO2 26 05/25/2023 07:29 AM    GLUCOSE 120 (H) 05/25/2023 07:29 AM    BUN 21 05/25/2023 07:29 AM    CREATININE 0.89 05/25/2023 07:29 AM    ALKPHOSPHAT 69 05/25/2023 07:29 AM    ASTSGOT 18 05/25/2023 07:29 AM    ALTSGPT 14 05/25/2023 07:29 AM    TBILIRUBIN 1.0 05/25/2023 07:29 AM     No results found for: INR  Lab Results   Component Value Date/Time    CHOLSTRLTOT 148 05/25/2023 07:29 AM    LDL 69 05/25/2023 07:29 AM    HDL 45 05/25/2023 07:29 AM    TRIGLYCERIDE 172 (H) 05/25/2023 07:29 AM       No results found for: TESTOSTERONE  No results found for: TSH  Lab Results  "  Component Value Date/Time    FREET4 0.93 10/14/2013 08:48 AM    FREET4 0.78 06/15/2012 07:11 AM     No results found for: URICACID  No components found for: VITB12  Lab Results   Component Value Date/Time    25HYDROXY 74 02/14/2023 07:54 AM    25HYDROXY 77 10/04/2021 08:06 AM               HPI    Review of Systems   Constitutional: Negative.    HENT: Negative.     Eyes: Negative.    Respiratory: Negative.     Cardiovascular: Negative.    Gastrointestinal: Negative.    Genitourinary: Negative.    Musculoskeletal: Negative.    Skin: Negative.    Neurological: Negative.    Endo/Heme/Allergies: Negative.    Psychiatric/Behavioral: Negative.                Objective     /70 (BP Location: Right arm, Patient Position: Sitting, BP Cuff Size: Adult)   Pulse 74   Temp 36.6 °C (97.9 °F) (Temporal)   Ht 1.651 m (5' 5\")   Wt 58.1 kg (128 lb)   LMP  (LMP Unknown)   SpO2 96%   BMI 21.30 kg/m²      Physical Exam  Vitals reviewed.   Constitutional:       General: She is not in acute distress.     Appearance: She is well-developed. She is not diaphoretic.   HENT:      Head: Normocephalic and atraumatic.      Right Ear: External ear normal.      Left Ear: External ear normal.      Nose: Nose normal.      Mouth/Throat:      Pharynx: No oropharyngeal exudate.   Eyes:      General: No scleral icterus.        Right eye: No discharge.         Left eye: No discharge.      Conjunctiva/sclera: Conjunctivae normal.      Pupils: Pupils are equal, round, and reactive to light.   Neck:      Thyroid: No thyromegaly.      Vascular: No JVD.      Trachea: No tracheal deviation.   Cardiovascular:      Rate and Rhythm: Normal rate and regular rhythm.      Heart sounds: Normal heart sounds. No murmur heard.     No friction rub. No gallop.   Pulmonary:      Effort: Pulmonary effort is normal. No respiratory distress.      Breath sounds: Normal breath sounds. No stridor. No wheezing or rales.   Chest:      Chest wall: No tenderness. "   Abdominal:      General: Bowel sounds are normal. There is no distension.      Palpations: Abdomen is soft. There is no mass.      Tenderness: There is no abdominal tenderness. There is no guarding or rebound.   Musculoskeletal:         General: No tenderness. Normal range of motion.      Cervical back: Normal range of motion and neck supple.   Lymphadenopathy:      Cervical: No cervical adenopathy.   Skin:     General: Skin is warm and dry.      Coloration: Skin is not pale.      Findings: No erythema or rash.   Neurological:      Mental Status: She is alert and oriented to person, place, and time.      Cranial Nerves: No cranial nerve deficit.      Motor: No abnormal muscle tone.      Coordination: Coordination normal.      Deep Tendon Reflexes: Reflexes are normal and symmetric. Reflexes normal.   Psychiatric:         Behavior: Behavior normal.         Thought Content: Thought content normal.         Judgment: Judgment normal.                             Assessment & Plan        1. Vitamin D deficiency disease        Under good control. Continue same regimen.  Vitamin D3 5000 units daily.  Check labs in 3 to 6 months to make sure she is not at gaining excessive amount of vitamin D3  - VITAMIN D,25 HYDROXY (DEFICIENCY); Future    2. Mixed hyperlipidemia- TG > 300     Under good control. Continue same regimen.   - CBC WITH DIFFERENTIAL; Future  - TSH; Future  - Lipid Profile; Future  - Comp Metabolic Panel; Future  - Fenofibrate 134 MG Cap; Take 1 Capsule by mouth every morning before breakfast.  Dispense: 90 Capsule; Refill: 3  - rosuvastatin (CRESTOR) 5 MG Tab; Take 1 Tablet by mouth every evening.  Dispense: 90 Tablet; Refill: 3    3. IFG (impaired fasting glucose)     Under good control. Continue same regimen.   - MICROALBUMIN CREAT RATIO URINE; Future  - HEMOGLOBIN A1C; Future    4. Mixed hyperlipidemia        Under good control. Continue same regimen.   - CBC WITH DIFFERENTIAL; Future  - TSH; Future  -  Lipid Profile; Future  - Comp Metabolic Panel; Future  - Fenofibrate 134 MG Cap; Take 1 Capsule by mouth every morning before breakfast.  Dispense: 90 Capsule; Refill: 3  - rosuvastatin (CRESTOR) 5 MG Tab; Take 1 Tablet by mouth every evening.  Dispense: 90 Tablet; Refill: 3    5. Gastroesophageal reflux disease with esophagitis     - omeprazole (PRILOSEC) 40 MG delayed-release capsule; Take 1 Capsule by mouth every day for 90 days.  Dispense: 90 Capsule; Refill: 3    6. 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 Continue same regimen.   - clobetasol (TEMOVATE) 0.05 % Cream; Apply 1 Application. topically 2 times a day.  Dispense: 60 g; Refill: 1    7. Need for vaccination     - Zoster Vac Recomb Adjuvanted (SHINGRIX) 50 MCG/0.5ML Recon Susp; Inject 0.5 mL into the shoulder, thigh, or buttocks one time for 1 dose.  Dispense: 0.5 mL; Refill: 0    8. Postmenopausal status (age-related) (natural)     - DS-BONE DENSITY STUDY (DEXA); Future    9. Menopausal state     - DS-BONE DENSITY STUDY (DEXA); Future

## 2023-06-07 ENCOUNTER — OFFICE VISIT (OUTPATIENT)
Dept: MEDICAL GROUP | Age: 78
End: 2023-06-07
Payer: MEDICARE

## 2023-06-07 VITALS
HEART RATE: 68 BPM | DIASTOLIC BLOOD PRESSURE: 62 MMHG | TEMPERATURE: 97.2 F | SYSTOLIC BLOOD PRESSURE: 110 MMHG | BODY MASS INDEX: 21.49 KG/M2 | OXYGEN SATURATION: 96 % | HEIGHT: 65 IN | WEIGHT: 129 LBS

## 2023-06-07 DIAGNOSIS — M54.2 ACUTE NECK PAIN: ICD-10-CM

## 2023-06-07 PROCEDURE — 99214 OFFICE O/P EST MOD 30 MIN: CPT | Performed by: INTERNAL MEDICINE

## 2023-06-07 PROCEDURE — 3078F DIAST BP <80 MM HG: CPT | Performed by: INTERNAL MEDICINE

## 2023-06-07 PROCEDURE — 3074F SYST BP LT 130 MM HG: CPT | Performed by: INTERNAL MEDICINE

## 2023-06-07 RX ORDER — MELOXICAM 7.5 MG/1
7.5 TABLET ORAL 2 TIMES DAILY PRN
Qty: 30 TABLET | Refills: 0 | Status: SHIPPED | OUTPATIENT
Start: 2023-06-07 | End: 2023-09-28

## 2023-06-07 ASSESSMENT — ENCOUNTER SYMPTOMS
CONSTITUTIONAL NEGATIVE: 1
RESPIRATORY NEGATIVE: 1
CARDIOVASCULAR NEGATIVE: 1
PSYCHIATRIC NEGATIVE: 1
NEUROLOGICAL NEGATIVE: 1
MYALGIAS: 1
GASTROINTESTINAL NEGATIVE: 1
EYES NEGATIVE: 1

## 2023-06-07 ASSESSMENT — FIBROSIS 4 INDEX: FIB4 SCORE: 1.57

## 2023-06-07 NOTE — PROGRESS NOTES
"Subjective     Rachele Madrigal is a 78 y.o. female who presents with Pain (Neck since Monday night cannot turn her neck or look up and down )  Patient has had a stiff neck and bilateral posterior upper neck pain in the paracervical spine musculature.  Also tenderness at the base of the occiput put work the musculature inserts.  Wants referral of meloxicam orally which has worked well for her before.          HPI    Review of Systems   Constitutional: Negative.    HENT: Negative.     Eyes: Negative.    Respiratory: Negative.     Cardiovascular: Negative.    Gastrointestinal: Negative.    Genitourinary: Negative.    Musculoskeletal:  Positive for joint pain and myalgias.   Skin: Negative.    Neurological: Negative.    Endo/Heme/Allergies: Negative.    Psychiatric/Behavioral: Negative.                Objective     /62 (BP Location: Left arm, Patient Position: Sitting, BP Cuff Size: Adult)   Pulse 68   Temp 36.2 °C (97.2 °F) (Temporal)   Ht 1.651 m (5' 5\")   Wt 58.5 kg (129 lb)   LMP  (LMP Unknown)   SpO2 96%   BMI 21.47 kg/m²      Physical Exam  Vitals reviewed.   Constitutional:       General: She is not in acute distress.     Appearance: She is well-developed. She is not diaphoretic.   HENT:      Head: Normocephalic and atraumatic.      Right Ear: External ear normal.      Left Ear: External ear normal.      Nose: Nose normal.      Mouth/Throat:      Pharynx: No oropharyngeal exudate.   Eyes:      General: No scleral icterus.        Right eye: No discharge.         Left eye: No discharge.      Conjunctiva/sclera: Conjunctivae normal.      Pupils: Pupils are equal, round, and reactive to light.   Neck:      Thyroid: No thyromegaly.      Vascular: No JVD.      Trachea: No tracheal deviation.   Cardiovascular:      Rate and Rhythm: Normal rate and regular rhythm.      Heart sounds: Normal heart sounds. No murmur heard.     No friction rub. No gallop.   Pulmonary:      Effort: Pulmonary effort is normal. No " respiratory distress.      Breath sounds: Normal breath sounds. No stridor. No wheezing or rales.   Chest:      Chest wall: No tenderness.   Abdominal:      General: Bowel sounds are normal. There is no distension.      Palpations: Abdomen is soft. There is no mass.      Tenderness: There is no abdominal tenderness. There is no guarding or rebound.   Musculoskeletal:         General: Tenderness present. Normal range of motion.      Cervical back: Normal range of motion and neck supple.      Comments: At there is mild paraspinous musculature tenderness of the upper cervical spine.  And neck area.  Full range of motion.  No neurological findings.   Lymphadenopathy:      Cervical: No cervical adenopathy.   Skin:     General: Skin is warm and dry.      Coloration: Skin is not pale.      Findings: No erythema or rash.   Neurological:      Mental Status: She is alert and oriented to person, place, and time.      Cranial Nerves: No cranial nerve deficit.      Motor: No abnormal muscle tone.      Coordination: Coordination normal.      Deep Tendon Reflexes: Reflexes are normal and symmetric. Reflexes normal.   Psychiatric:         Behavior: Behavior normal.         Thought Content: Thought content normal.         Judgment: Judgment normal.                             Assessment & Plan        1. Acute neck pain-probably cervical strain.  Trial of Mobic and topical diclofenac.  Heat alternating ice and stretching.  Referral to physical medicine if no improvement in 1 month.     - meloxicam (MOBIC) 7.5 MG Tab; Take 1 Tablet by mouth 2 times a day as needed for Moderate Pain (neck pain).  Dispense: 30 Tablet; Refill: 0  - diclofenac sodium (VOLTAREN) 1 % Gel; Apply 2 g topically 4 times a day as needed (neck pain). To affected joint  Dispense: 350 g; Refill: 2

## 2023-06-21 ENCOUNTER — TELEPHONE (OUTPATIENT)
Dept: HEALTH INFORMATION MANAGEMENT | Facility: OTHER | Age: 78
End: 2023-06-21

## 2023-07-26 ENCOUNTER — HOSPITAL ENCOUNTER (OUTPATIENT)
Dept: RADIOLOGY | Facility: MEDICAL CENTER | Age: 78
End: 2023-07-26
Attending: INTERNAL MEDICINE
Payer: MEDICARE

## 2023-07-26 DIAGNOSIS — Z12.31 ENCOUNTER FOR SCREENING MAMMOGRAM FOR MALIGNANT NEOPLASM OF BREAST: ICD-10-CM

## 2023-07-26 DIAGNOSIS — Z78.0 POSTMENOPAUSAL STATUS (AGE-RELATED) (NATURAL): ICD-10-CM

## 2023-07-26 DIAGNOSIS — N95.1 MENOPAUSAL STATE: ICD-10-CM

## 2023-07-26 PROCEDURE — 77063 BREAST TOMOSYNTHESIS BI: CPT

## 2023-07-26 PROCEDURE — 77080 DXA BONE DENSITY AXIAL: CPT

## 2023-09-20 ENCOUNTER — HOSPITAL ENCOUNTER (OUTPATIENT)
Dept: LAB | Facility: MEDICAL CENTER | Age: 78
End: 2023-09-20
Attending: INTERNAL MEDICINE
Payer: MEDICARE

## 2023-09-20 DIAGNOSIS — R73.01 IFG (IMPAIRED FASTING GLUCOSE): ICD-10-CM

## 2023-09-20 DIAGNOSIS — E55.9 VITAMIN D DEFICIENCY: ICD-10-CM

## 2023-09-20 DIAGNOSIS — E78.2 MIXED HYPERLIPIDEMIA: ICD-10-CM

## 2023-09-20 LAB
25(OH)D3 SERPL-MCNC: 77 NG/ML (ref 30–100)
ALBUMIN SERPL BCP-MCNC: 4.8 G/DL (ref 3.2–4.9)
ALBUMIN/GLOB SERPL: 1.9 G/DL
ALP SERPL-CCNC: 62 U/L (ref 30–99)
ALT SERPL-CCNC: 18 U/L (ref 2–50)
ANION GAP SERPL CALC-SCNC: 11 MMOL/L (ref 7–16)
AST SERPL-CCNC: 16 U/L (ref 12–45)
BASOPHILS # BLD AUTO: 0.9 % (ref 0–1.8)
BASOPHILS # BLD: 0.04 K/UL (ref 0–0.12)
BILIRUB SERPL-MCNC: 1.1 MG/DL (ref 0.1–1.5)
BUN SERPL-MCNC: 23 MG/DL (ref 8–22)
CALCIUM ALBUM COR SERPL-MCNC: 9 MG/DL (ref 8.5–10.5)
CALCIUM SERPL-MCNC: 9.6 MG/DL (ref 8.5–10.5)
CHLORIDE SERPL-SCNC: 105 MMOL/L (ref 96–112)
CHOLEST SERPL-MCNC: 132 MG/DL (ref 100–199)
CO2 SERPL-SCNC: 26 MMOL/L (ref 20–33)
CREAT SERPL-MCNC: 0.84 MG/DL (ref 0.5–1.4)
CREAT UR-MCNC: 70.59 MG/DL
EOSINOPHIL # BLD AUTO: 0.06 K/UL (ref 0–0.51)
EOSINOPHIL NFR BLD: 1.4 % (ref 0–6.9)
ERYTHROCYTE [DISTWIDTH] IN BLOOD BY AUTOMATED COUNT: 44.2 FL (ref 35.9–50)
EST. AVERAGE GLUCOSE BLD GHB EST-MCNC: 137 MG/DL
GFR SERPLBLD CREATININE-BSD FMLA CKD-EPI: 71 ML/MIN/1.73 M 2
GLOBULIN SER CALC-MCNC: 2.5 G/DL (ref 1.9–3.5)
GLUCOSE SERPL-MCNC: 113 MG/DL (ref 65–99)
HBA1C MFR BLD: 6.4 % (ref 4–5.6)
HCT VFR BLD AUTO: 41.7 % (ref 37–47)
HDLC SERPL-MCNC: 48 MG/DL
HGB BLD-MCNC: 13.4 G/DL (ref 12–16)
IMM GRANULOCYTES # BLD AUTO: 0.01 K/UL (ref 0–0.11)
IMM GRANULOCYTES NFR BLD AUTO: 0.2 % (ref 0–0.9)
LDLC SERPL CALC-MCNC: 58 MG/DL
LYMPHOCYTES # BLD AUTO: 1.62 K/UL (ref 1–4.8)
LYMPHOCYTES NFR BLD: 37.7 % (ref 22–41)
MCH RBC QN AUTO: 30.5 PG (ref 27–33)
MCHC RBC AUTO-ENTMCNC: 32.1 G/DL (ref 32.2–35.5)
MCV RBC AUTO: 95 FL (ref 81.4–97.8)
MICROALBUMIN UR-MCNC: 1.6 MG/DL
MICROALBUMIN/CREAT UR: 23 MG/G (ref 0–30)
MONOCYTES # BLD AUTO: 0.32 K/UL (ref 0–0.85)
MONOCYTES NFR BLD AUTO: 7.4 % (ref 0–13.4)
NEUTROPHILS # BLD AUTO: 2.25 K/UL (ref 1.82–7.42)
NEUTROPHILS NFR BLD: 52.4 % (ref 44–72)
NRBC # BLD AUTO: 0 K/UL
NRBC BLD-RTO: 0 /100 WBC (ref 0–0.2)
PLATELET # BLD AUTO: 237 K/UL (ref 164–446)
PMV BLD AUTO: 11 FL (ref 9–12.9)
POTASSIUM SERPL-SCNC: 4 MMOL/L (ref 3.6–5.5)
PROT SERPL-MCNC: 7.3 G/DL (ref 6–8.2)
RBC # BLD AUTO: 4.39 M/UL (ref 4.2–5.4)
SODIUM SERPL-SCNC: 142 MMOL/L (ref 135–145)
TRIGL SERPL-MCNC: 129 MG/DL (ref 0–149)
TSH SERPL DL<=0.005 MIU/L-ACNC: 1.57 UIU/ML (ref 0.38–5.33)
WBC # BLD AUTO: 4.3 K/UL (ref 4.8–10.8)

## 2023-09-20 PROCEDURE — 82043 UR ALBUMIN QUANTITATIVE: CPT

## 2023-09-20 PROCEDURE — 83036 HEMOGLOBIN GLYCOSYLATED A1C: CPT

## 2023-09-20 PROCEDURE — 80053 COMPREHEN METABOLIC PANEL: CPT

## 2023-09-20 PROCEDURE — 80061 LIPID PANEL: CPT

## 2023-09-20 PROCEDURE — 85025 COMPLETE CBC W/AUTO DIFF WBC: CPT

## 2023-09-20 PROCEDURE — 84443 ASSAY THYROID STIM HORMONE: CPT

## 2023-09-20 PROCEDURE — 82570 ASSAY OF URINE CREATININE: CPT

## 2023-09-20 PROCEDURE — 82306 VITAMIN D 25 HYDROXY: CPT

## 2023-09-20 PROCEDURE — 36415 COLL VENOUS BLD VENIPUNCTURE: CPT

## 2023-09-28 ENCOUNTER — OFFICE VISIT (OUTPATIENT)
Dept: MEDICAL GROUP | Age: 78
End: 2023-09-28
Payer: MEDICARE

## 2023-09-28 VITALS
HEIGHT: 65 IN | OXYGEN SATURATION: 94 % | DIASTOLIC BLOOD PRESSURE: 62 MMHG | WEIGHT: 126 LBS | SYSTOLIC BLOOD PRESSURE: 130 MMHG | TEMPERATURE: 97.9 F | BODY MASS INDEX: 20.99 KG/M2 | HEART RATE: 67 BPM

## 2023-09-28 DIAGNOSIS — E55.9 VITAMIN D DEFICIENCY: ICD-10-CM

## 2023-09-28 DIAGNOSIS — E78.2 MIXED HYPERLIPIDEMIA: ICD-10-CM

## 2023-09-28 DIAGNOSIS — R73.01 IMPAIRED FASTING GLUCOSE: ICD-10-CM

## 2023-09-28 PROCEDURE — 99214 OFFICE O/P EST MOD 30 MIN: CPT | Performed by: INTERNAL MEDICINE

## 2023-09-28 PROCEDURE — 3075F SYST BP GE 130 - 139MM HG: CPT | Performed by: INTERNAL MEDICINE

## 2023-09-28 PROCEDURE — 3078F DIAST BP <80 MM HG: CPT | Performed by: INTERNAL MEDICINE

## 2023-09-28 RX ORDER — ROSUVASTATIN CALCIUM 5 MG/1
5 TABLET, COATED ORAL EVERY EVENING
Qty: 90 TABLET | Refills: 3 | Status: SHIPPED | OUTPATIENT
Start: 2023-09-28

## 2023-09-28 RX ORDER — FENOFIBRATE 134 MG/1
1 CAPSULE ORAL
Qty: 90 CAPSULE | Refills: 3 | Status: SHIPPED | OUTPATIENT
Start: 2023-09-28

## 2023-09-28 ASSESSMENT — FIBROSIS 4 INDEX: FIB4 SCORE: 1.23

## 2023-09-28 ASSESSMENT — ENCOUNTER SYMPTOMS
RESPIRATORY NEGATIVE: 1
EYES NEGATIVE: 1
CARDIOVASCULAR NEGATIVE: 1
MUSCULOSKELETAL NEGATIVE: 1
NEUROLOGICAL NEGATIVE: 1
CONSTITUTIONAL NEGATIVE: 1
GASTROINTESTINAL NEGATIVE: 1
PSYCHIATRIC NEGATIVE: 1

## 2023-09-28 NOTE — PROGRESS NOTES
Subjective     Rachele Madrigal is a 78 y.o. female who presents with Follow-Up (Lab review )  .fu  The patient is here for followup of chronic medical problems listed below. The patient is compliant with medications and having no side effects from them. Denies chest pain, abdominal pain, dyspnea, myalgias, or cough.   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 topically 2 times a day. 60 g 1    cyanocobalamin (VITAMIN B-12) 500 MCG Tab Take 1,250 mcg by mouth every day. Twice a week      vitamin e (VITAMIN E) 400 UNIT Cap Take 180 Units by mouth every day.      Cholecalciferol (VITAMIN D3) 5000 units Cap Take 1 Cap by mouth every day. 100 Cap 4    naphazoline-pheniramine (OPCON-A) 0.027-0.315 % SOLN Place 1 Drop in both eyes 3 times a day as needed.        diclofenac sodium (VOLTAREN) 1 % Gel Apply 2 g topically 4 times a day as needed (neck pain). To affected joint 350 g 2    meloxicam (MOBIC) 7.5 MG Tab Take 1 Tablet by mouth 2 times a day as needed for Moderate Pain (neck pain). 30 Tablet 0    Fenofibrate 134 MG Cap Take 1 Capsule by mouth every morning before breakfast. 90 Capsule 3    rosuvastatin (CRESTOR) 5 MG Tab Take 1 Tablet by mouth every evening. 90 Tablet 3     No facility-administered medications prior to visit.     Hospital Outpatient Visit on 09/20/2023   Component Date Value    25-Hydroxy   Vitamin D 25 09/20/2023 77      Glycohemoglobin 09/20/2023 6.4 (H)     Est Avg Glucose 09/20/2023 137     Sodium 09/20/2023 142     Potassium 09/20/2023 4.0     Chloride 09/20/2023 105     Co2 09/20/2023 26     Anion Gap 09/20/2023 11.0     Glucose 09/20/2023 113 (H)     Bun 09/20/2023 23 (H)     Creatinine 09/20/2023 0.84     Calcium 09/20/2023 9.6     Correct Calcium 09/20/2023 9.0     AST(SGOT) 09/20/2023 16     ALT(SGPT) 09/20/2023 18     Alkaline Phosphatase 09/20/2023 62     Total Bilirubin 09/20/2023 1.1     Albumin 09/20/2023 4.8     Total Protein 09/20/2023 7.3     Globulin 09/20/2023 2.5     A-G Ratio 09/20/2023 1.9     Cholesterol,Tot 09/20/2023 132     Triglycerides 09/20/2023 129     HDL 09/20/2023 48     LDL 09/20/2023 58     TSH 09/20/2023 1.570     WBC 09/20/2023 4.3 (L)     RBC 09/20/2023 4.39     Hemoglobin 09/20/2023 13.4     Hematocrit 09/20/2023 41.7     MCV 09/20/2023 95.0     MCH 09/20/2023 30.5     MCHC 09/20/2023 32.1 (L)     RDW 09/20/2023 44.2     Platelet Count 09/20/2023 237     MPV 09/20/2023 11.0     Neutrophils-Polys 09/20/2023 52.40     Lymphocytes 09/20/2023 37.70     Monocytes 09/20/2023 7.40     Eosinophils 09/20/2023 1.40     Basophils 09/20/2023 0.90     Immature Granulocytes 09/20/2023 0.20     Nucleated RBC 09/20/2023 0.00     Neutrophils (Absolute) 09/20/2023 2.25     Lymphs (Absolute) 09/20/2023 1.62     Monos (Absolute) 09/20/2023 0.32     Eos (Absolute) 09/20/2023 0.06     Baso (Absolute) 09/20/2023 0.04     Immature Granulocytes (a* 09/20/2023 0.01     NRBC (Absolute) 09/20/2023 0.00     Creatinine, Urine 09/20/2023 70.59     Microalbumin, Urine Rand* 09/20/2023 1.6     Micro Alb Creat Ratio 09/20/2023 23     GFR (CKD-EPI) 09/20/2023 71       Lab Results   Component Value Date/Time    HBA1C 6.4 (H) 09/20/2023 08:00 AM    HBA1C 6.5 (H) 02/14/2023 07:54 AM     Lab Results   Component Value Date/Time    SODIUM 142 09/20/2023 08:00 AM    POTASSIUM 4.0 09/20/2023 08:00 AM    CHLORIDE 105 09/20/2023 08:00  "AM    CO2 26 09/20/2023 08:00 AM    GLUCOSE 113 (H) 09/20/2023 08:00 AM    BUN 23 (H) 09/20/2023 08:00 AM    CREATININE 0.84 09/20/2023 08:00 AM    ALKPHOSPHAT 62 09/20/2023 08:00 AM    ASTSGOT 16 09/20/2023 08:00 AM    ALTSGPT 18 09/20/2023 08:00 AM    TBILIRUBIN 1.1 09/20/2023 08:00 AM     No results found for: \"INR\"  Lab Results   Component Value Date/Time    CHOLSTRLTOT 132 09/20/2023 08:00 AM    LDL 58 09/20/2023 08:00 AM    HDL 48 09/20/2023 08:00 AM    TRIGLYCERIDE 129 09/20/2023 08:00 AM       No results found for: \"TESTOSTERONE\"  No results found for: \"TSH\"  Lab Results   Component Value Date/Time    FREET4 0.93 10/14/2013 08:48 AM    FREET4 0.78 06/15/2012 07:11 AM     No results found for: \"URICACID\"  No components found for: \"VITB12\"  Lab Results   Component Value Date/Time    25HYDROXY 77 09/20/2023 08:00 AM    25HYDROXY 74 02/14/2023 07:54 AM   '          HPI     Review of Systems   Constitutional: Negative.    HENT: Negative.     Eyes: Negative.    Respiratory: Negative.     Cardiovascular: Negative.    Gastrointestinal: Negative.    Genitourinary: Negative.    Musculoskeletal: Negative.    Skin: Negative.    Neurological: Negative.    Endo/Heme/Allergies: Negative.    Psychiatric/Behavioral: Negative.                Objective     /62 (BP Location: Left arm, Patient Position: Sitting, BP Cuff Size: Adult)   Pulse 67   Temp 36.6 °C (97.9 °F) (Temporal)   Ht 1.651 m (5' 5\")   Wt 57.2 kg (126 lb)   LMP  (LMP Unknown)   SpO2 94%   BMI 20.97 kg/m²      Physical Exam  Vitals reviewed.   Constitutional:       General: She is not in acute distress.     Appearance: She is well-developed. She is not diaphoretic.   HENT:      Head: Normocephalic and atraumatic.      Right Ear: External ear normal.      Left Ear: External ear normal.      Nose: Nose normal.      Mouth/Throat:      Pharynx: No oropharyngeal exudate.   Eyes:      General: No scleral icterus.        Right eye: No discharge.         Left " eye: No discharge.      Conjunctiva/sclera: Conjunctivae normal.      Pupils: Pupils are equal, round, and reactive to light.   Neck:      Thyroid: No thyromegaly.      Vascular: No JVD.      Trachea: No tracheal deviation.   Cardiovascular:      Rate and Rhythm: Normal rate and regular rhythm.      Heart sounds: Normal heart sounds. No murmur heard.     No friction rub. No gallop.   Pulmonary:      Effort: Pulmonary effort is normal. No respiratory distress.      Breath sounds: Normal breath sounds. No stridor. No wheezing or rales.   Chest:      Chest wall: No tenderness.   Abdominal:      General: Bowel sounds are normal. There is no distension.      Palpations: Abdomen is soft. There is no mass.      Tenderness: There is no abdominal tenderness. There is no guarding or rebound.   Musculoskeletal:         General: No tenderness. Normal range of motion.      Cervical back: Normal range of motion and neck supple.   Lymphadenopathy:      Cervical: No cervical adenopathy.   Skin:     General: Skin is warm and dry.      Coloration: Skin is not pale.      Findings: No erythema or rash.   Neurological:      Mental Status: She is alert and oriented to person, place, and time.      Cranial Nerves: No cranial nerve deficit.      Motor: No abnormal muscle tone.      Coordination: Coordination normal.      Deep Tendon Reflexes: Reflexes are normal and symmetric. Reflexes normal.   Psychiatric:         Behavior: Behavior normal.         Thought Content: Thought content normal.         Judgment: Judgment normal.                             Assessment & Plan        1. Vitamin D deficiency disease  Good control continue current regimen vitamin D3 2000 units daily  - VITAMIN D,25 HYDROXY (DEFICIENCY); Future    2. Impaired fasting glucose  Good control continue Mediterranean diet and exercise.  No meds  - HEMOGLOBIN A1C; Future    3. Mixed hyperlipidemia- TG > 300  Good control continue fenofibrate and rosuvastatin statin as  prescribed below  - Comp Metabolic Panel; Future  - CBC WITH DIFFERENTIAL; Future  - TSH; Future  - Lipid Profile; Future  - Fenofibrate 134 MG Cap; Take 1 Capsule by mouth every morning before breakfast.  Dispense: 90 Capsule; Refill: 3  - rosuvastatin (CRESTOR) 5 MG Tab; Take 1 Tablet by mouth every evening.  Dispense: 90 Tablet; Refill: 3    4. Mixed hyperlipidemia        As above  - Comp Metabolic Panel; Future  - CBC WITH DIFFERENTIAL; Future  - TSH; Future  - Lipid Profile; Future  - Fenofibrate 134 MG Cap; Take 1 Capsule by mouth every morning before breakfast.  Dispense: 90 Capsule; Refill: 3  - rosuvastatin (CRESTOR) 5 MG Tab; Take 1 Tablet by mouth every evening.  Dispense: 90 Tablet; Refill: 3

## 2024-05-02 ENCOUNTER — HOSPITAL ENCOUNTER (OUTPATIENT)
Dept: RADIOLOGY | Facility: MEDICAL CENTER | Age: 79
End: 2024-05-02
Attending: NURSE PRACTITIONER
Payer: MEDICARE

## 2024-05-02 ENCOUNTER — OFFICE VISIT (OUTPATIENT)
Dept: URGENT CARE | Facility: PHYSICIAN GROUP | Age: 79
End: 2024-05-02
Payer: MEDICARE

## 2024-05-02 VITALS
OXYGEN SATURATION: 92 % | SYSTOLIC BLOOD PRESSURE: 124 MMHG | TEMPERATURE: 97 F | HEART RATE: 69 BPM | HEIGHT: 67 IN | BODY MASS INDEX: 21.19 KG/M2 | RESPIRATION RATE: 18 BRPM | DIASTOLIC BLOOD PRESSURE: 82 MMHG | WEIGHT: 135 LBS

## 2024-05-02 DIAGNOSIS — R05.1 ACUTE COUGH: ICD-10-CM

## 2024-05-02 DIAGNOSIS — J06.9 VIRAL URI: ICD-10-CM

## 2024-05-02 LAB
FLUAV RNA SPEC QL NAA+PROBE: NEGATIVE
FLUBV RNA SPEC QL NAA+PROBE: NEGATIVE
RSV RNA SPEC QL NAA+PROBE: NEGATIVE
SARS-COV-2 RNA RESP QL NAA+PROBE: NEGATIVE

## 2024-05-02 PROCEDURE — 0241U POCT CEPHEID COV-2, FLU A/B, RSV - PCR: CPT | Performed by: NURSE PRACTITIONER

## 2024-05-02 PROCEDURE — 99214 OFFICE O/P EST MOD 30 MIN: CPT | Performed by: NURSE PRACTITIONER

## 2024-05-02 PROCEDURE — 3079F DIAST BP 80-89 MM HG: CPT | Performed by: NURSE PRACTITIONER

## 2024-05-02 PROCEDURE — 3074F SYST BP LT 130 MM HG: CPT | Performed by: NURSE PRACTITIONER

## 2024-05-02 RX ORDER — GUAIFENESIN AND DEXTROMETHORPHAN HYDROBROMIDE 20; 400 MG/1; MG/1
1 TABLET ORAL EVERY 4 HOURS PRN
Qty: 30 TABLET | Refills: 0 | Status: SHIPPED | OUTPATIENT
Start: 2024-05-02

## 2024-05-02 RX ORDER — PREDNISONE 20 MG/1
40 TABLET ORAL DAILY
Qty: 10 TABLET | Refills: 0 | Status: SHIPPED | OUTPATIENT
Start: 2024-05-02 | End: 2024-05-07

## 2024-05-02 ASSESSMENT — VISUAL ACUITY: OU: 1

## 2024-05-02 ASSESSMENT — ENCOUNTER SYMPTOMS
SHORTNESS OF BREATH: 0
COUGH: 1
CONSTITUTIONAL NEGATIVE: 1
SORE THROAT: 0
CARDIOVASCULAR NEGATIVE: 1
FEVER: 0
SPUTUM PRODUCTION: 1

## 2024-05-02 ASSESSMENT — FIBROSIS 4 INDEX: FIB4 SCORE: 1.24

## 2024-05-02 NOTE — PROGRESS NOTES
Subjective:     Rachele Madrigal is a 79 y.o. female who presents for Cough (3 DAYS )       Cough  Pertinent negatives include no fever, sore throat or shortness of breath.     Patient also being seen with her  who has been having similar symptoms.    They both developed a cough about 3 days ago while they were visiting [place].  Came back yesterday.    Patient coughing up yellow phlegm.  Denies history of lung/breathing problems.    Tx: Advil.    Review of Systems   Constitutional: Negative.  Negative for fever and malaise/fatigue.   HENT: Negative.  Negative for congestion and sore throat.    Respiratory:  Positive for cough and sputum production. Negative for shortness of breath.    Cardiovascular: Negative.    All other systems reviewed and are negative.    Refer to HPI for additional details.    During this visit, appropriate PPE was worn, and hand hygiene was performed.    PMH:  has a past medical history of Allergy and Chronic left shoulder pain (8/24/2022).    MEDS:   Current Outpatient Medications:     Dextromethorphan-guaiFENesin  MG Tab, Take 1 Tablet by mouth every four hours as needed (Cough/mucus)., Disp: 30 Tablet, Rfl: 0    predniSONE (DELTASONE) 20 MG Tab, Take 2 Tablets by mouth every day for 5 days., Disp: 10 Tablet, Rfl: 0    Fenofibrate 134 MG Cap, Take 1 Capsule by mouth every morning before breakfast., Disp: 90 Capsule, Rfl: 3    rosuvastatin (CRESTOR) 5 MG Tab, Take 1 Tablet by mouth every evening., Disp: 90 Tablet, Rfl: 3    diclofenac sodium (VOLTAREN) 1 % Gel, Apply 2 g topically 4 times a day as needed (neck pain). To affected joint, Disp: 350 g, Rfl: 2    clobetasol (TEMOVATE) 0.05 % Cream, Apply 1 Application. topically 2 times a day., Disp: 60 g, Rfl: 1    cyanocobalamin (VITAMIN B-12) 500 MCG Tab, Take 1,250 mcg by mouth every day. Twice a week, Disp: , Rfl:     vitamin e (VITAMIN E) 400 UNIT Cap, Take 180 Units by mouth every day., Disp: , Rfl:     Cholecalciferol (VITAMIN D3)  "5000 units Cap, Take 1 Cap by mouth every day., Disp: 100 Cap, Rfl: 4    naphazoline-pheniramine (OPCON-A) 0.027-0.315 % SOLN, Place 1 Drop in both eyes 3 times a day as needed.  , Disp: , Rfl:     ALLERGIES: No Known Allergies  SURGHX:   Past Surgical History:   Procedure Laterality Date    TONSILLECTOMY  1952     SOCHX:  reports that she has never smoked. She has never used smokeless tobacco. She reports that she does not drink alcohol and does not use drugs.    FH: Per HPI as applicable/pertinent.      Objective:     /82   Pulse 69   Temp 36.1 °C (97 °F) (Temporal)   Resp 18   Ht 1.702 m (5' 7\")   Wt 61.2 kg (135 lb)   LMP  (LMP Unknown)   SpO2 92%   BMI 21.14 kg/m²     Physical Exam  Nursing note reviewed.   Constitutional:       General: She is not in acute distress.     Appearance: She is well-developed. She is not ill-appearing or toxic-appearing.   HENT:      Nose: Nose normal.      Mouth/Throat:      Mouth: Mucous membranes are moist.      Pharynx: Oropharynx is clear.   Eyes:      General: Vision grossly intact.      Extraocular Movements: Extraocular movements intact.      Conjunctiva/sclera: Conjunctivae normal.   Neck:      Trachea: Phonation normal.   Cardiovascular:      Rate and Rhythm: Normal rate and regular rhythm.      Heart sounds: Normal heart sounds.   Pulmonary:      Effort: Pulmonary effort is normal. No respiratory distress.      Breath sounds: Normal breath sounds. No decreased breath sounds.   Musculoskeletal:         General: No deformity. Normal range of motion.      Cervical back: Normal range of motion.   Skin:     General: Skin is warm and dry.      Coloration: Skin is not pale.   Neurological:      Mental Status: She is alert and oriented to person, place, and time.      Motor: No weakness.   Psychiatric:         Behavior: Behavior normal. Behavior is cooperative.     Chest x-ray:    Details    Reading Physician Reading Date Result Priority   Sukhwinder Jamison, " M.D.  [phone] 5/2/2024      Narrative & Impression     5/2/2024 12:43 PM     HISTORY/REASON FOR EXAM: Cough.     TECHNIQUE/EXAM DESCRIPTION AND NUMBER OF VIEWS:  Two views of the chest.     COMPARISON:  May 22, 2019.     FINDINGS:  Cardiomediastinal contours are normal.     No pulmonary consolidation is seen. Similar subpleural biapical opacity     No pleural space process is evident.     IMPRESSION:     No radiographic evidence of acute cardiopulmonary process.     Subpleural biapical opacity is stable and likely indicates pleural parenchymal scarring. This could obscure an underlying nodule.           Exam Ended: 05/02/24 12:48 PM Last Resulted: 05/02/24  1:34 PM           Radiology report and images reviewed by myself. Concur with findings.    POCT Cepheid CoV-2, Flu A/B, RSV by PCR: negative      Assessment/Plan:     1. Acute cough  - POCT CEPHEID COV-2, FLU A/B, RSV - PCR  - DX-CHEST-2 VIEWS; Future  - Dextromethorphan-guaiFENesin  MG Tab; Take 1 Tablet by mouth every four hours as needed (Cough/mucus).  Dispense: 30 Tablet; Refill: 0  - predniSONE (DELTASONE) 20 MG Tab; Take 2 Tablets by mouth every day for 5 days.  Dispense: 10 Tablet; Refill: 0    2. Viral URI    Discussed likely self-limiting viral etiology and expected course and duration of illness. Vital signs stable, afebrile, no acute distress at this time. SpO2 92%. Chest x-ray clear.    Emphasize supportive measures and symptom management with over-the-counter medication as needed. Rx as above sent electronically.     Standard precautions/mask/wash hands.    Monitor. Follow up in 5 days if symptoms do not improve or sooner if symptoms change or worsen.     Differential diagnosis, natural history, supportive care, over-the-counter symptom management per 's instructions, close monitoring, and indications for immediate follow-up discussed.     All questions answered. Patient agrees with the plan of care.    Discharge summary  provided.    Billing note: moderate complexity and moderate risk. Established patient. [id number]. Please refer to LOS tool for details.

## 2024-05-02 NOTE — PATIENT INSTRUCTIONS
Details    Reading Physician Reading Date Result Priority   Sukhwinder Jamison M.D.  [phone] 5/2/2024      Narrative & Impression     5/2/2024 12:43 PM     HISTORY/REASON FOR EXAM: Cough.     TECHNIQUE/EXAM DESCRIPTION AND NUMBER OF VIEWS:  Two views of the chest.     COMPARISON:  May 22, 2019.     FINDINGS:  Cardiomediastinal contours are normal.     No pulmonary consolidation is seen. Similar subpleural biapical opacity     No pleural space process is evident.     IMPRESSION:     No radiographic evidence of acute cardiopulmonary process.     Subpleural biapical opacity is stable and likely indicates pleural parenchymal scarring. This could obscure an underlying nodule.           Exam Ended: 05/02/24 12:48 PM Last Resulted: 05/02/24  1:34 PM

## 2024-05-09 ENCOUNTER — OFFICE VISIT (OUTPATIENT)
Dept: MEDICAL GROUP | Age: 79
End: 2024-05-09
Payer: MEDICARE

## 2024-05-09 VITALS
TEMPERATURE: 98.2 F | SYSTOLIC BLOOD PRESSURE: 120 MMHG | WEIGHT: 130 LBS | BODY MASS INDEX: 21.66 KG/M2 | HEIGHT: 65 IN | HEART RATE: 83 BPM | DIASTOLIC BLOOD PRESSURE: 54 MMHG | OXYGEN SATURATION: 94 %

## 2024-05-09 DIAGNOSIS — I70.0 ATHEROSCLEROSIS OF ABDOMINAL AORTA (HCC): ICD-10-CM

## 2024-05-09 DIAGNOSIS — J06.9 ACUTE URI: ICD-10-CM

## 2024-05-09 PROCEDURE — 3074F SYST BP LT 130 MM HG: CPT | Performed by: STUDENT IN AN ORGANIZED HEALTH CARE EDUCATION/TRAINING PROGRAM

## 2024-05-09 PROCEDURE — 3078F DIAST BP <80 MM HG: CPT | Performed by: STUDENT IN AN ORGANIZED HEALTH CARE EDUCATION/TRAINING PROGRAM

## 2024-05-09 PROCEDURE — 99213 OFFICE O/P EST LOW 20 MIN: CPT | Performed by: STUDENT IN AN ORGANIZED HEALTH CARE EDUCATION/TRAINING PROGRAM

## 2024-05-09 RX ORDER — OMEPRAZOLE 40 MG/1
40 CAPSULE, DELAYED RELEASE ORAL DAILY
COMMUNITY
Start: 2024-03-20

## 2024-05-09 ASSESSMENT — FIBROSIS 4 INDEX: FIB4 SCORE: 1.24

## 2024-05-09 ASSESSMENT — PATIENT HEALTH QUESTIONNAIRE - PHQ9: CLINICAL INTERPRETATION OF PHQ2 SCORE: 0

## 2024-05-09 NOTE — PROGRESS NOTES
Chief Complaint   Patient presents with    Cough     Ongoing for 8 days cough, headache, runny nose. Recently went to urgent care, symptoms have worsen.         HPI:   Patient is a 79 y.o. female complaining of 8 days of illness including: dry cough, rhinorrhea, sneezing, nasal congestion, headaches and malaise.  Mucus is: thin.  Similarly ill exposures: yes.  Treatments tried: Dextromethorphan/guaifenesin, Tylenol and ibuprofen and prednisone for 5 days.  Patient was seen at [facility] urgent care on May 2.  She  reports that she has never smoked. She has never used smokeless tobacco..     ROS:  No fever, chills, night sweats, nausea, shortness of breath, changes in bowel movements or skin rash.      I reviewed the patient's medications, allergies and medical history:  Current Outpatient Medications   Medication Sig Dispense Refill    omeprazole (PRILOSEC) 40 MG delayed-release capsule Take 40 mg by mouth every day. FOR 90 DAYS      Dextromethorphan-guaiFENesin  MG Tab Take 1 Tablet by mouth every four hours as needed (Cough/mucus). 30 Tablet 0    Fenofibrate 134 MG Cap Take 1 Capsule by mouth every morning before breakfast. 90 Capsule 3    rosuvastatin (CRESTOR) 5 MG Tab Take 1 Tablet by mouth every evening. 90 Tablet 3    diclofenac sodium (VOLTAREN) 1 % Gel Apply 2 g topically 4 times a day as needed (neck pain). To affected joint 350 g 2    clobetasol (TEMOVATE) 0.05 % Cream Apply 1 Application. topically 2 times a day. 60 g 1    cyanocobalamin (VITAMIN B-12) 500 MCG Tab Take 1,250 mcg by mouth every day. Twice a week      vitamin e (VITAMIN E) 400 UNIT Cap Take 180 Units by mouth every day.      Cholecalciferol (VITAMIN D3) 5000 units Cap Take 1 Cap by mouth every day. 100 Cap 4    naphazoline-pheniramine (OPCON-A) 0.027-0.315 % SOLN Place 1 Drop in both eyes 3 times a day as needed.         No current facility-administered medications for this visit.     Patient has no known allergies.  Past Medical History:  "  Diagnosis Date    Allergy     Chronic left shoulder pain 8/24/2022        EXAM:  /54 (BP Location: Left arm, Patient Position: Sitting, BP Cuff Size: Adult)   Pulse 83   Temp 36.8 °C (98.2 °F) (Temporal)   Ht 1.651 m (5' 5\")   Wt 59 kg (130 lb)   SpO2 94%   General: Alert, no conversational dyspnea or audible wheeze, non-toxic appearance.  Eyes: PERRL, conjunctiva slightly injected, no eye discharge.  Ears: Normal pinnae,TM's normal bilaterally.  Nares: Patent with thin mucus.  Sinuses: non tender over maxillary / frontal sinuses.  Throat: Erythematous injection without exudate.   Neck: Supple, with no adenopathy.  Lungs: Clear to auscultation bilaterally, no wheeze, crackles or rhonchi.   Heart: Regular rate without murmur.  Skin: Warm and dry without rash.     ASSESSMENT:     1. Acute URI  -Ongoing symptoms for the past 8 days  -See HPI for details  -Patient stated today that his symptoms have improved mildly, however she was expecting to be feeling significantly better by now  -I explained to Mrs. Madrigal that it is not unusual to experience symptoms for 2 weeks after developing upper respiratory infections  -I reassured her after physical examination that she does not have any concerning signs or symptoms that would indicate therapy with antibiotics  -She has not have any fever, chills, night sweats, shortness of breath, tachycardia, increased sputum production, or low oxygen saturation.  -I told Mrs. Madrigal that she needs to continue with symptomatic therapy and hopefully she will still feeling better within a week.       PLAN:  1. Educated patient that majority of upper respiratory infections are viral and do not need antibiotics.   2. Twice daily use of nasal saline rinse or Neti-Pot.  3. OTC anti-pyretics and decongestants as needed.  4. Follow-up in office or urgent care for worsening symptoms, difficulty breathing, lack of expected recovery, or should new symptoms or problems arise.    "

## 2024-05-09 NOTE — PATIENT INSTRUCTIONS
- Continue symptomatic treatment  - If fever, chills, night sweats, tachycardia, SOB or increased thick sputum production call office.  - If not better in 2 weeks make an appt

## 2024-05-14 ENCOUNTER — TELEPHONE (OUTPATIENT)
Dept: HEALTH INFORMATION MANAGEMENT | Facility: OTHER | Age: 79
End: 2024-05-14
Payer: MEDICARE

## 2024-05-14 NOTE — TELEPHONE ENCOUNTER
Pt stated to send her a message on [employer] and then disconnected call. [employer] message sent.

## 2024-06-25 RX ORDER — OMEPRAZOLE 40 MG/1
40 CAPSULE, DELAYED RELEASE ORAL DAILY
Qty: 90 CAPSULE | Refills: 0 | Status: SHIPPED | OUTPATIENT
Start: 2024-06-25

## 2024-07-15 ENCOUNTER — HOSPITAL ENCOUNTER (OUTPATIENT)
Dept: LAB | Facility: MEDICAL CENTER | Age: 79
End: 2024-07-15
Attending: INTERNAL MEDICINE
Payer: MEDICARE

## 2024-07-15 DIAGNOSIS — R73.01 IMPAIRED FASTING GLUCOSE: ICD-10-CM

## 2024-07-15 DIAGNOSIS — E78.2 MIXED HYPERLIPIDEMIA: ICD-10-CM

## 2024-07-15 DIAGNOSIS — E55.9 VITAMIN D DEFICIENCY: ICD-10-CM

## 2024-07-15 LAB
25(OH)D3 SERPL-MCNC: 63 NG/ML (ref 30–100)
ALBUMIN SERPL BCP-MCNC: 4.5 G/DL (ref 3.2–4.9)
ALBUMIN/GLOB SERPL: 1.7 G/DL
ALP SERPL-CCNC: 61 U/L (ref 30–99)
ALT SERPL-CCNC: 23 U/L (ref 2–50)
ANION GAP SERPL CALC-SCNC: 14 MMOL/L (ref 7–16)
AST SERPL-CCNC: 19 U/L (ref 12–45)
BASOPHILS # BLD AUTO: 0.8 % (ref 0–1.8)
BASOPHILS # BLD: 0.04 K/UL (ref 0–0.12)
BILIRUB SERPL-MCNC: 0.9 MG/DL (ref 0.1–1.5)
BUN SERPL-MCNC: 22 MG/DL (ref 8–22)
CALCIUM ALBUM COR SERPL-MCNC: 9.1 MG/DL (ref 8.5–10.5)
CALCIUM SERPL-MCNC: 9.5 MG/DL (ref 8.5–10.5)
CHLORIDE SERPL-SCNC: 105 MMOL/L (ref 96–112)
CHOLEST SERPL-MCNC: 153 MG/DL (ref 100–199)
CO2 SERPL-SCNC: 23 MMOL/L (ref 20–33)
CREAT SERPL-MCNC: 0.91 MG/DL (ref 0.5–1.4)
EOSINOPHIL # BLD AUTO: 0.06 K/UL (ref 0–0.51)
EOSINOPHIL NFR BLD: 1.3 % (ref 0–6.9)
ERYTHROCYTE [DISTWIDTH] IN BLOOD BY AUTOMATED COUNT: 43.4 FL (ref 35.9–50)
EST. AVERAGE GLUCOSE BLD GHB EST-MCNC: 146 MG/DL
GFR SERPLBLD CREATININE-BSD FMLA CKD-EPI: 64 ML/MIN/1.73 M 2
GLOBULIN SER CALC-MCNC: 2.7 G/DL (ref 1.9–3.5)
GLUCOSE SERPL-MCNC: 122 MG/DL (ref 65–99)
HBA1C MFR BLD: 6.7 % (ref 4–5.6)
HCT VFR BLD AUTO: 42.7 % (ref 37–47)
HDLC SERPL-MCNC: 45 MG/DL
HGB BLD-MCNC: 14 G/DL (ref 12–16)
IMM GRANULOCYTES # BLD AUTO: 0.01 K/UL (ref 0–0.11)
IMM GRANULOCYTES NFR BLD AUTO: 0.2 % (ref 0–0.9)
LDLC SERPL CALC-MCNC: 82 MG/DL
LYMPHOCYTES # BLD AUTO: 2.25 K/UL (ref 1–4.8)
LYMPHOCYTES NFR BLD: 47.8 % (ref 22–41)
MCH RBC QN AUTO: 31.6 PG (ref 27–33)
MCHC RBC AUTO-ENTMCNC: 32.8 G/DL (ref 32.2–35.5)
MCV RBC AUTO: 96.4 FL (ref 81.4–97.8)
MONOCYTES # BLD AUTO: 0.29 K/UL (ref 0–0.85)
MONOCYTES NFR BLD AUTO: 6.2 % (ref 0–13.4)
NEUTROPHILS # BLD AUTO: 2.06 K/UL (ref 1.82–7.42)
NEUTROPHILS NFR BLD: 43.7 % (ref 44–72)
NRBC # BLD AUTO: 0 K/UL
NRBC BLD-RTO: 0 /100 WBC (ref 0–0.2)
PLATELET # BLD AUTO: 246 K/UL (ref 164–446)
PMV BLD AUTO: 10.7 FL (ref 9–12.9)
POTASSIUM SERPL-SCNC: 4 MMOL/L (ref 3.6–5.5)
PROT SERPL-MCNC: 7.2 G/DL (ref 6–8.2)
RBC # BLD AUTO: 4.43 M/UL (ref 4.2–5.4)
SODIUM SERPL-SCNC: 142 MMOL/L (ref 135–145)
TRIGL SERPL-MCNC: 132 MG/DL (ref 0–149)
TSH SERPL-ACNC: 1.67 UIU/ML (ref 0.35–5.5)
WBC # BLD AUTO: 4.7 K/UL (ref 4.8–10.8)

## 2024-07-15 PROCEDURE — 84443 ASSAY THYROID STIM HORMONE: CPT

## 2024-07-15 PROCEDURE — 82306 VITAMIN D 25 HYDROXY: CPT

## 2024-07-15 PROCEDURE — 80061 LIPID PANEL: CPT

## 2024-07-15 PROCEDURE — 83036 HEMOGLOBIN GLYCOSYLATED A1C: CPT

## 2024-07-15 PROCEDURE — 36415 COLL VENOUS BLD VENIPUNCTURE: CPT

## 2024-07-15 PROCEDURE — 85025 COMPLETE CBC W/AUTO DIFF WBC: CPT

## 2024-07-15 PROCEDURE — 80053 COMPREHEN METABOLIC PANEL: CPT

## 2024-07-18 ENCOUNTER — APPOINTMENT (OUTPATIENT)
Dept: MEDICAL GROUP | Age: 79
End: 2024-07-18
Payer: MEDICARE

## 2024-07-19 SDOH — HEALTH STABILITY: MENTAL HEALTH
STRESS IS WHEN SOMEONE FEELS TENSE, NERVOUS, ANXIOUS, OR CAN'T SLEEP AT NIGHT BECAUSE THEIR MIND IS TROUBLED. HOW STRESSED ARE YOU?: NOT AT ALL

## 2024-07-19 SDOH — ECONOMIC STABILITY: TRANSPORTATION INSECURITY

## 2024-07-19 SDOH — ECONOMIC STABILITY: FOOD INSECURITY: WITHIN THE PAST 12 MONTHS, YOU WORRIED THAT YOUR FOOD WOULD RUN OUT BEFORE YOU GOT MONEY TO BUY MORE.: NEVER TRUE

## 2024-07-19 SDOH — ECONOMIC STABILITY: HOUSING INSECURITY
IN THE LAST 12 MONTHS, WAS THERE A TIME WHEN YOU DID NOT HAVE A STEADY PLACE TO SLEEP OR SLEPT IN A SHELTER (INCLUDING NOW)?: NO

## 2024-07-19 SDOH — ECONOMIC STABILITY: FOOD INSECURITY: WITHIN THE PAST 12 MONTHS, THE FOOD YOU BOUGHT JUST DIDN'T LAST AND YOU DIDN'T HAVE MONEY TO GET MORE.: NEVER TRUE

## 2024-07-19 SDOH — ECONOMIC STABILITY: INCOME INSECURITY: IN THE LAST 12 MONTHS, WAS THERE A TIME WHEN YOU WERE NOT ABLE TO PAY THE MORTGAGE OR RENT ON TIME?: NO

## 2024-07-19 SDOH — HEALTH STABILITY: PHYSICAL HEALTH: ON AVERAGE, HOW MANY DAYS PER WEEK DO YOU ENGAGE IN MODERATE TO STRENUOUS EXERCISE (LIKE A BRISK WALK)?: 3 DAYS

## 2024-07-19 SDOH — HEALTH STABILITY: PHYSICAL HEALTH: ON AVERAGE, HOW MANY MINUTES DO YOU ENGAGE IN EXERCISE AT THIS LEVEL?: 30 MIN

## 2024-07-19 SDOH — ECONOMIC STABILITY: HOUSING INSECURITY

## 2024-07-19 ASSESSMENT — SOCIAL DETERMINANTS OF HEALTH (SDOH)
HOW OFTEN DO YOU ATTENT MEETINGS OF THE CLUB OR ORGANIZATION YOU BELONG TO?: NEVER
HOW OFTEN DO YOU ATTEND CHURCH OR RELIGIOUS SERVICES?: NEVER
IN A TYPICAL WEEK, HOW MANY TIMES DO YOU TALK ON THE PHONE WITH FAMILY, FRIENDS, OR NEIGHBORS?: MORE THAN THREE TIMES A WEEK
HOW OFTEN DO YOU ATTENT MEETINGS OF THE CLUB OR ORGANIZATION YOU BELONG TO?: NEVER
WITHIN THE PAST 12 MONTHS, YOU WORRIED THAT YOUR FOOD WOULD RUN OUT BEFORE YOU GOT THE MONEY TO BUY MORE: NEVER TRUE
HOW OFTEN DO YOU ATTEND CHURCH OR RELIGIOUS SERVICES?: NEVER

## 2024-07-22 ENCOUNTER — APPOINTMENT (OUTPATIENT)
Dept: MEDICAL GROUP | Age: 79
End: 2024-07-22
Payer: MEDICARE

## 2024-07-22 VITALS
RESPIRATION RATE: 16 BRPM | SYSTOLIC BLOOD PRESSURE: 110 MMHG | DIASTOLIC BLOOD PRESSURE: 60 MMHG | HEART RATE: 74 BPM | TEMPERATURE: 97.8 F | OXYGEN SATURATION: 93 % | WEIGHT: 129 LBS | HEIGHT: 65 IN | BODY MASS INDEX: 21.49 KG/M2

## 2024-07-22 DIAGNOSIS — Z00.00 MEDICARE ANNUAL WELLNESS VISIT, SUBSEQUENT: ICD-10-CM

## 2024-07-22 DIAGNOSIS — M81.0 AGE-RELATED OSTEOPOROSIS WITHOUT CURRENT PATHOLOGICAL FRACTURE: ICD-10-CM

## 2024-07-22 DIAGNOSIS — E78.2 MIXED HYPERLIPIDEMIA: ICD-10-CM

## 2024-07-22 DIAGNOSIS — R73.01 IMPAIRED FASTING GLUCOSE: ICD-10-CM

## 2024-07-22 DIAGNOSIS — I70.0 ATHEROSCLEROSIS OF ABDOMINAL AORTA (HCC): ICD-10-CM

## 2024-07-22 DIAGNOSIS — Z23 NEED FOR VACCINATION: ICD-10-CM

## 2024-07-22 DIAGNOSIS — E55.9 VITAMIN D DEFICIENCY: ICD-10-CM

## 2024-07-22 PROCEDURE — 3074F SYST BP LT 130 MM HG: CPT | Performed by: INTERNAL MEDICINE

## 2024-07-22 PROCEDURE — 3078F DIAST BP <80 MM HG: CPT | Performed by: INTERNAL MEDICINE

## 2024-07-22 PROCEDURE — G0439 PPPS, SUBSEQ VISIT: HCPCS | Mod: 25 | Performed by: INTERNAL MEDICINE

## 2024-07-22 RX ORDER — ROSUVASTATIN CALCIUM 5 MG/1
5 TABLET, COATED ORAL EVERY EVENING
Qty: 90 TABLET | Refills: 2 | Status: SHIPPED | OUTPATIENT
Start: 2024-07-22

## 2024-07-22 RX ORDER — VITAMIN E 268 MG
400 CAPSULE ORAL DAILY
Qty: 100 CAPSULE | Refills: 2 | Status: SHIPPED | OUTPATIENT
Start: 2024-07-22

## 2024-07-22 RX ORDER — CLOSTRIDIUM TETANI TOXOID ANTIGEN (FORMALDEHYDE INACTIVATED), CORYNEBACTERIUM DIPHTHERIAE TOXOID ANTIGEN (FORMALDEHYDE INACTIVATED), BORDETELLA PERTUSSIS TOXOID ANTIGEN (GLUTARALDEHYDE INACTIVATED), BORDETELLA PERTUSSIS FILAMENTOUS HEMAGGLUTININ ANTIGEN (FORMALDEHYDE INACTIVATED), BORDETELLA PERTUSSIS PERTACTIN ANTIGEN, AND BORDETELLA PERTUSSIS FIMBRIAE 2/3 ANTIGEN 5; 2; 2.5; 5; 3; 5 [LF]/.5ML; [LF]/.5ML; UG/.5ML; UG/.5ML; UG/.5ML; UG/.5ML
0.5 INJECTION, SUSPENSION INTRAMUSCULAR ONCE
Qty: 0.5 ML | Refills: 0 | Status: SHIPPED
Start: 2024-07-22 | End: 2024-07-22

## 2024-07-22 RX ORDER — OMEPRAZOLE 40 MG/1
40 CAPSULE, DELAYED RELEASE ORAL DAILY
Qty: 90 CAPSULE | Refills: 2 | Status: SHIPPED | OUTPATIENT
Start: 2024-07-22

## 2024-07-22 RX ORDER — FENOFIBRATE 134 MG/1
1 CAPSULE ORAL
Qty: 90 CAPSULE | Refills: 2 | Status: SHIPPED | OUTPATIENT
Start: 2024-07-22

## 2024-07-22 ASSESSMENT — PATIENT HEALTH QUESTIONNAIRE - PHQ9: CLINICAL INTERPRETATION OF PHQ2 SCORE: 0

## 2024-07-22 ASSESSMENT — ACTIVITIES OF DAILY LIVING (ADL): BATHING_REQUIRES_ASSISTANCE: 0

## 2024-07-22 ASSESSMENT — ENCOUNTER SYMPTOMS: GENERAL WELL-BEING: GOOD

## 2024-07-22 ASSESSMENT — FIBROSIS 4 INDEX: FIB4 SCORE: 1.26

## 2024-11-27 DIAGNOSIS — E78.2 MIXED HYPERLIPIDEMIA: ICD-10-CM

## 2024-11-27 RX ORDER — ROSUVASTATIN CALCIUM 5 MG/1
5 TABLET, COATED ORAL DAILY
Qty: 100 TABLET | Refills: 0 | Status: SHIPPED | OUTPATIENT
Start: 2024-11-27

## 2025-01-21 ENCOUNTER — HOSPITAL ENCOUNTER (OUTPATIENT)
Dept: LAB | Facility: MEDICAL CENTER | Age: 80
End: 2025-01-21
Attending: INTERNAL MEDICINE
Payer: MEDICARE

## 2025-01-21 DIAGNOSIS — R73.01 IMPAIRED FASTING GLUCOSE: ICD-10-CM

## 2025-01-21 DIAGNOSIS — E55.9 VITAMIN D DEFICIENCY: ICD-10-CM

## 2025-01-21 DIAGNOSIS — E78.2 MIXED HYPERLIPIDEMIA: ICD-10-CM

## 2025-01-21 LAB
25(OH)D3 SERPL-MCNC: 82 NG/ML (ref 30–100)
ALBUMIN SERPL BCP-MCNC: 4.7 G/DL (ref 3.2–4.9)
ALBUMIN/GLOB SERPL: 1.9 G/DL
ALP SERPL-CCNC: 53 U/L (ref 30–99)
ALT SERPL-CCNC: 22 U/L (ref 2–50)
ANION GAP SERPL CALC-SCNC: 14 MMOL/L (ref 7–16)
AST SERPL-CCNC: 22 U/L (ref 12–45)
BASOPHILS # BLD AUTO: 0.8 % (ref 0–1.8)
BASOPHILS # BLD: 0.04 K/UL (ref 0–0.12)
BILIRUB SERPL-MCNC: 0.8 MG/DL (ref 0.1–1.5)
BUN SERPL-MCNC: 23 MG/DL (ref 8–22)
CALCIUM ALBUM COR SERPL-MCNC: 8.8 MG/DL (ref 8.5–10.5)
CALCIUM SERPL-MCNC: 9.4 MG/DL (ref 8.5–10.5)
CHLORIDE SERPL-SCNC: 104 MMOL/L (ref 96–112)
CO2 SERPL-SCNC: 24 MMOL/L (ref 20–33)
CREAT SERPL-MCNC: 1.04 MG/DL (ref 0.5–1.4)
EOSINOPHIL # BLD AUTO: 0.09 K/UL (ref 0–0.51)
EOSINOPHIL NFR BLD: 1.7 % (ref 0–6.9)
ERYTHROCYTE [DISTWIDTH] IN BLOOD BY AUTOMATED COUNT: 42.8 FL (ref 35.9–50)
EST. AVERAGE GLUCOSE BLD GHB EST-MCNC: 143 MG/DL
GFR SERPLBLD CREATININE-BSD FMLA CKD-EPI: 55 ML/MIN/1.73 M 2
GLOBULIN SER CALC-MCNC: 2.5 G/DL (ref 1.9–3.5)
GLUCOSE SERPL-MCNC: 113 MG/DL (ref 65–99)
HBA1C MFR BLD: 6.6 % (ref 4–5.6)
HCT VFR BLD AUTO: 42.8 % (ref 37–47)
HGB BLD-MCNC: 13.7 G/DL (ref 12–16)
IMM GRANULOCYTES # BLD AUTO: 0 K/UL (ref 0–0.11)
IMM GRANULOCYTES NFR BLD AUTO: 0 % (ref 0–0.9)
LYMPHOCYTES # BLD AUTO: 2.05 K/UL (ref 1–4.8)
LYMPHOCYTES NFR BLD: 39.5 % (ref 22–41)
MCH RBC QN AUTO: 30.8 PG (ref 27–33)
MCHC RBC AUTO-ENTMCNC: 32 G/DL (ref 32.2–35.5)
MCV RBC AUTO: 96.2 FL (ref 81.4–97.8)
MONOCYTES # BLD AUTO: 0.34 K/UL (ref 0–0.85)
MONOCYTES NFR BLD AUTO: 6.6 % (ref 0–13.4)
NEUTROPHILS # BLD AUTO: 2.67 K/UL (ref 1.82–7.42)
NEUTROPHILS NFR BLD: 51.4 % (ref 44–72)
NRBC # BLD AUTO: 0 K/UL
NRBC BLD-RTO: 0 /100 WBC (ref 0–0.2)
PLATELET # BLD AUTO: 252 K/UL (ref 164–446)
PMV BLD AUTO: 10.7 FL (ref 9–12.9)
POTASSIUM SERPL-SCNC: 3.9 MMOL/L (ref 3.6–5.5)
PROT SERPL-MCNC: 7.2 G/DL (ref 6–8.2)
RBC # BLD AUTO: 4.45 M/UL (ref 4.2–5.4)
SODIUM SERPL-SCNC: 142 MMOL/L (ref 135–145)
TSH SERPL-ACNC: 1.8 UIU/ML (ref 0.35–5.5)
WBC # BLD AUTO: 5.2 K/UL (ref 4.8–10.8)

## 2025-01-21 PROCEDURE — 85025 COMPLETE CBC W/AUTO DIFF WBC: CPT

## 2025-01-21 PROCEDURE — 83036 HEMOGLOBIN GLYCOSYLATED A1C: CPT

## 2025-01-21 PROCEDURE — 80053 COMPREHEN METABOLIC PANEL: CPT

## 2025-01-21 PROCEDURE — 82306 VITAMIN D 25 HYDROXY: CPT

## 2025-01-21 PROCEDURE — 36415 COLL VENOUS BLD VENIPUNCTURE: CPT

## 2025-01-21 PROCEDURE — 84443 ASSAY THYROID STIM HORMONE: CPT

## 2025-01-28 ENCOUNTER — OFFICE VISIT (OUTPATIENT)
Dept: MEDICAL GROUP | Age: 80
End: 2025-01-28
Payer: MEDICARE

## 2025-01-28 VITALS
SYSTOLIC BLOOD PRESSURE: 122 MMHG | WEIGHT: 130 LBS | BODY MASS INDEX: 21.66 KG/M2 | TEMPERATURE: 97.2 F | DIASTOLIC BLOOD PRESSURE: 64 MMHG | HEART RATE: 63 BPM | HEIGHT: 65 IN | OXYGEN SATURATION: 96 %

## 2025-01-28 DIAGNOSIS — E78.2 MIXED HYPERLIPIDEMIA: ICD-10-CM

## 2025-01-28 DIAGNOSIS — E55.9 VITAMIN D DEFICIENCY DISEASE: ICD-10-CM

## 2025-01-28 DIAGNOSIS — K21.9 GASTROESOPHAGEAL REFLUX DISEASE, UNSPECIFIED WHETHER ESOPHAGITIS PRESENT: ICD-10-CM

## 2025-01-28 DIAGNOSIS — E55.9 VITAMIN D DEFICIENCY: ICD-10-CM

## 2025-01-28 DIAGNOSIS — R73.01 IMPAIRED FASTING GLUCOSE: ICD-10-CM

## 2025-01-28 PROCEDURE — 3074F SYST BP LT 130 MM HG: CPT | Performed by: INTERNAL MEDICINE

## 2025-01-28 PROCEDURE — 3078F DIAST BP <80 MM HG: CPT | Performed by: INTERNAL MEDICINE

## 2025-01-28 PROCEDURE — 99214 OFFICE O/P EST MOD 30 MIN: CPT | Performed by: INTERNAL MEDICINE

## 2025-01-28 RX ORDER — OMEPRAZOLE 40 MG/1
40 CAPSULE, DELAYED RELEASE ORAL DAILY
Qty: 100 CAPSULE | Refills: 2 | Status: SHIPPED | OUTPATIENT
Start: 2025-01-28

## 2025-01-28 RX ORDER — FENOFIBRATE 134 MG/1
1 CAPSULE ORAL
Qty: 100 CAPSULE | Refills: 2 | Status: SHIPPED | OUTPATIENT
Start: 2025-01-28

## 2025-01-28 RX ORDER — ROSUVASTATIN CALCIUM 5 MG/1
5 TABLET, COATED ORAL DAILY
Qty: 100 TABLET | Refills: 2 | Status: SHIPPED | OUTPATIENT
Start: 2025-01-28

## 2025-01-28 ASSESSMENT — ENCOUNTER SYMPTOMS
CONSTITUTIONAL NEGATIVE: 1
EYES NEGATIVE: 1
PSYCHIATRIC NEGATIVE: 1
NEUROLOGICAL NEGATIVE: 1
CARDIOVASCULAR NEGATIVE: 1
GASTROINTESTINAL NEGATIVE: 1
RESPIRATORY NEGATIVE: 1
MUSCULOSKELETAL NEGATIVE: 1

## 2025-01-28 ASSESSMENT — PATIENT HEALTH QUESTIONNAIRE - PHQ9: CLINICAL INTERPRETATION OF PHQ2 SCORE: 0

## 2025-01-28 ASSESSMENT — FIBROSIS 4 INDEX: FIB4 SCORE: 1.47

## 2025-01-29 NOTE — ASSESSMENT & PLAN NOTE
Orders:    rosuvastatin (CRESTOR) 5 MG Tab; Take 1 Tablet by mouth every day.    Fenofibrate 134 MG Cap; Take 1 Capsule by mouth every morning before breakfast.    Comp Metabolic Panel; Future    CBC WITH DIFFERENTIAL; Future    Lipid Profile; Future    TSH; Future

## 2025-01-29 NOTE — PROGRESS NOTES
Subjective     Rachele Madrigal is a 80 y.o. female who presents with Follow-Up (Lab review )    History of Present Illness  The patient presents for a routine checkup.    She has been adhering to her prescribed regimen of rosuvastatin 5 mg, as ordered by Dr. Ibrahim. She is not currently on any antihypertensive medications. She does not take any thyroid medication. She received her influenza vaccine in September.    She continues to take fenofibrate.    She takes omeprazole for heartburn.    She supplements her diet with vitamin D, which she procures from [employer], and also takes vitamin B12 on a weekly basis.    MEDICATIONS  Rosuvastatin, fenofibrate, omeprazole, vitamin D, vitamin B12.    IMMUNIZATIONS  She received her influenza vaccine in September.    No Principal Problem: There is no principal problem currently on the Problem List. Please update the Problem List and refresh.  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 topically 2 times a day. 60 g 1    rosuvastatin (CRESTOR) 5 MG Tab Take 1 Tablet by mouth every day. 100 Tablet 0    Fenofibrate 134 MG Cap Take 1 Capsule by mouth every morning before breakfast. 90 Capsule 2    omeprazole (PRILOSEC) 40 MG delayed-release capsule Take 1 Capsule by mouth every day. FOR 90 DAYS 90 Capsule 2    vitamin e (VITAMIN E) 400 UNIT Cap Take 1 Capsule by mouth every day. 100 Capsule 2     cyanocobalamin (VITAMIN B-12) 500 MCG Tab Take 1,250 mcg by mouth every day. Twice a week      Cholecalciferol (VITAMIN D3) 5000 units Cap Take 1 Cap by mouth every day. 100 Cap 4     No facility-administered medications prior to visit.     Hospital Outpatient Visit on 01/21/2025   Component Date Value    25-Hydroxy   Vitamin D 25 01/21/2025 82     Glycohemoglobin 01/21/2025 6.6 (H)     Est Avg Glucose 01/21/2025 143     TSH 01/21/2025 1.800     WBC 01/21/2025 5.2     RBC 01/21/2025 4.45     Hemoglobin 01/21/2025 13.7     Hematocrit 01/21/2025 42.8     MCV 01/21/2025 96.2     MCH 01/21/2025 30.8     MCHC 01/21/2025 32.0 (L)     RDW 01/21/2025 42.8     Platelet Count 01/21/2025 252     MPV 01/21/2025 10.7     Neutrophils-Polys 01/21/2025 51.40     Lymphocytes 01/21/2025 39.50     Monocytes 01/21/2025 6.60     Eosinophils 01/21/2025 1.70     Basophils 01/21/2025 0.80     Immature Granulocytes 01/21/2025 0.00     Nucleated RBC 01/21/2025 0.00     Neutrophils (Absolute) 01/21/2025 2.67     Lymphs (Absolute) 01/21/2025 2.05     Monos (Absolute) 01/21/2025 0.34     Eos (Absolute) 01/21/2025 0.09     Baso (Absolute) 01/21/2025 0.04     Immature Granulocytes (a* 01/21/2025 0.00     NRBC (Absolute) 01/21/2025 0.00     Sodium 01/21/2025 142     Potassium 01/21/2025 3.9     Chloride 01/21/2025 104     Co2 01/21/2025 24     Anion Gap 01/21/2025 14.0     Glucose 01/21/2025 113 (H)     Bun 01/21/2025 23 (H)     Creatinine 01/21/2025 1.04     Calcium 01/21/2025 9.4     Correct Calcium 01/21/2025 8.8     AST(SGOT) 01/21/2025 22     ALT(SGPT) 01/21/2025 22     Alkaline Phosphatase 01/21/2025 53     Total Bilirubin 01/21/2025 0.8     Albumin 01/21/2025 4.7     Total Protein 01/21/2025 7.2     Globulin 01/21/2025 2.5     A-G Ratio 01/21/2025 1.9     GFR (CKD-EPI) 01/21/2025 55 (A)       Lab Results   Component Value Date/Time    HBA1C 6.6 (H) 01/21/2025 07:28 AM    HBA1C 6.7 (H) 07/15/2024 07:51 AM     Lab Results   Component Value  "Date/Time    SODIUM 142 01/21/2025 07:28 AM    POTASSIUM 3.9 01/21/2025 07:28 AM    CHLORIDE 104 01/21/2025 07:28 AM    CO2 24 01/21/2025 07:28 AM    GLUCOSE 113 (H) 01/21/2025 07:28 AM    BUN 23 (H) 01/21/2025 07:28 AM    CREATININE 1.04 01/21/2025 07:28 AM    ALKPHOSPHAT 53 01/21/2025 07:28 AM    ASTSGOT 22 01/21/2025 07:28 AM    ALTSGPT 22 01/21/2025 07:28 AM    TBILIRUBIN 0.8 01/21/2025 07:28 AM     No results found for: \"INR\"  Lab Results   Component Value Date/Time    CHOLSTRLTOT 153 07/15/2024 07:51 AM    LDL 82 07/15/2024 07:51 AM    HDL 45 07/15/2024 07:51 AM    TRIGLYCERIDE 132 07/15/2024 07:51 AM       No results found for: \"TESTOSTERONE\"  No results found for: \"TSH\"  Lab Results   Component Value Date/Time    FREET4 0.93 10/14/2013 08:48 AM    FREET4 0.78 06/15/2012 07:11 AM     No results found for: \"URICACID\"  No components found for: \"VITB12\"  Lab Results   Component Value Date/Time    25HYDROXY 82 01/21/2025 07:28 AM    25HYDROXY 63 07/15/2024 07:51 AM                 HPI    Review of Systems   Constitutional: Negative.    HENT: Negative.     Eyes: Negative.    Respiratory: Negative.     Cardiovascular: Negative.    Gastrointestinal: Negative.    Genitourinary: Negative.    Musculoskeletal: Negative.    Skin: Negative.    Neurological: Negative.    Endo/Heme/Allergies: Negative.    Psychiatric/Behavioral: Negative.                Objective     /64 (BP Location: Right arm, Patient Position: Sitting, BP Cuff Size: Adult)   Pulse 63   Temp 36.2 °C (97.2 °F) (Temporal)   Ht 1.651 m (5' 5\")   Wt 59 kg (130 lb)   LMP  (LMP Unknown)   SpO2 96%   BMI 21.63 kg/m²      Physical Exam  Vitals reviewed.   Constitutional:       General: She is not in acute distress.     Appearance: She is well-developed. She is not diaphoretic.   HENT:      Head: Normocephalic and atraumatic.      Right Ear: External ear normal.      Left Ear: External ear normal.      Nose: Nose normal.      Mouth/Throat:      " Pharynx: No oropharyngeal exudate.   Eyes:      General: No scleral icterus.        Right eye: No discharge.         Left eye: No discharge.      Conjunctiva/sclera: Conjunctivae normal.      Pupils: Pupils are equal, round, and reactive to light.   Neck:      Thyroid: No thyromegaly.      Vascular: No JVD.      Trachea: No tracheal deviation.   Cardiovascular:      Rate and Rhythm: Normal rate and regular rhythm.      Heart sounds: Normal heart sounds. No murmur heard.     No friction rub. No gallop.   Pulmonary:      Effort: Pulmonary effort is normal. No respiratory distress.      Breath sounds: Normal breath sounds. No stridor. No wheezing or rales.   Chest:      Chest wall: No tenderness.   Abdominal:      General: Bowel sounds are normal. There is no distension.      Palpations: Abdomen is soft. There is no mass.      Tenderness: There is no abdominal tenderness. There is no guarding or rebound.   Musculoskeletal:         General: No tenderness. Normal range of motion.      Cervical back: Normal range of motion and neck supple.   Lymphadenopathy:      Cervical: No cervical adenopathy.   Skin:     General: Skin is warm and dry.      Coloration: Skin is not pale.      Findings: No erythema or rash.   Neurological:      Mental Status: She is alert and oriented to person, place, and time.      Cranial Nerves: No cranial nerve deficit.      Motor: No abnormal muscle tone.      Coordination: Coordination normal.      Deep Tendon Reflexes: Reflexes are normal and symmetric. Reflexes normal.   Psychiatric:         Behavior: Behavior normal.         Thought Content: Thought content normal.         Judgment: Judgment normal.                             Assessment & Plan        Assessment & Plan  Mixed hyperlipidemia- TG > 300    Orders:    rosuvastatin (CRESTOR) 5 MG Tab; Take 1 Tablet by mouth every day.    Fenofibrate 134 MG Cap; Take 1 Capsule by mouth every morning before breakfast.    Comp Metabolic Panel; Future     CBC WITH DIFFERENTIAL; Future    Lipid Profile; Future    TSH; Future    Vitamin D deficiency disease    Orders:    Cholecalciferol (VITAMIN D3) 125 MCG (5000 UT) Cap; Take 1 Capsule by mouth every day.    Gastroesophageal reflux disease, unspecified whether esophagitis present    Orders:    omeprazole (PRILOSEC) 40 MG delayed-release capsule; Take 1 Capsule by mouth every day. FOR 90 DAYS    Impaired fasting glucose    Orders:    HEMOGLOBIN A1C; Future    Vitamin D deficiency disease    Orders:    VITAMIN D,25 HYDROXY (DEFICIENCY); Future          Assessment & Plan  1. Routine checkup.  Her blood pressure readings have consistently been within the normal range. The most recent blood work, conducted on 01/21/2025, revealed a normal blood count. However, her blood glucose levels and GFR were borderline, but not concerning. Her A1c, vitamin D, and liver enzyme levels were all within the normal range. The last cholesterol check, conducted in July 2023, showed normal levels. She will continue her current regimen of rosuvastatin 5 mg, fenofibrate, and omeprazole. She will also maintain her intake of over-the-counter vitamin D and B12 supplements. A comprehensive lab order has been placed, including a cholesterol panel, to be conducted in 6 months.    2. Cholesterol management.  She is currently on rosuvastatin 5 mg and fenofibrate. A cholesterol panel will be included in the comprehensive lab order to be conducted in 6 months.    3. Indigestion and heartburn.  She is taking omeprazole for indigestion and heartburn. She will continue this medication as prescribed.    4. Vitamin D supplementation.  She is taking over-the-counter vitamin D supplements. She will continue this regimen.    5. Vitamin B12 supplementation.  She takes vitamin B12 supplements once a week. She will continue this regimen.    Follow-up  The patient will follow up in 6 months.

## 2025-03-24 ENCOUNTER — PATIENT MESSAGE (OUTPATIENT)
Dept: HEALTH INFORMATION MANAGEMENT | Facility: OTHER | Age: 80
End: 2025-03-24

## 2025-08-22 ENCOUNTER — HOSPITAL ENCOUNTER (OUTPATIENT)
Dept: LAB | Facility: MEDICAL CENTER | Age: 80
End: 2025-08-22
Attending: INTERNAL MEDICINE
Payer: MEDICARE

## 2025-08-22 DIAGNOSIS — E55.9 VITAMIN D DEFICIENCY: ICD-10-CM

## 2025-08-22 DIAGNOSIS — E78.2 MIXED HYPERLIPIDEMIA: ICD-10-CM

## 2025-08-22 DIAGNOSIS — R73.01 IMPAIRED FASTING GLUCOSE: ICD-10-CM

## 2025-08-22 LAB
25(OH)D3 SERPL-MCNC: 64 NG/ML (ref 30–100)
ALBUMIN SERPL BCP-MCNC: 4.5 G/DL (ref 3.2–4.9)
ALBUMIN/GLOB SERPL: 1.8 G/DL
ALP SERPL-CCNC: 57 U/L (ref 30–99)
ALT SERPL-CCNC: 36 U/L (ref 2–50)
ANION GAP SERPL CALC-SCNC: 13 MMOL/L (ref 7–16)
AST SERPL-CCNC: 32 U/L (ref 12–45)
BASOPHILS # BLD AUTO: 0.7 % (ref 0–1.8)
BASOPHILS # BLD: 0.03 K/UL (ref 0–0.12)
BILIRUB SERPL-MCNC: 0.8 MG/DL (ref 0.1–1.5)
BUN SERPL-MCNC: 25 MG/DL (ref 8–22)
CALCIUM ALBUM COR SERPL-MCNC: 9 MG/DL (ref 8.5–10.5)
CALCIUM SERPL-MCNC: 9.4 MG/DL (ref 8.5–10.5)
CHLORIDE SERPL-SCNC: 106 MMOL/L (ref 96–112)
CHOLEST SERPL-MCNC: 156 MG/DL (ref 100–199)
CO2 SERPL-SCNC: 22 MMOL/L (ref 20–33)
CREAT SERPL-MCNC: 0.97 MG/DL (ref 0.5–1.4)
EOSINOPHIL # BLD AUTO: 0.13 K/UL (ref 0–0.51)
EOSINOPHIL NFR BLD: 3.1 % (ref 0–6.9)
ERYTHROCYTE [DISTWIDTH] IN BLOOD BY AUTOMATED COUNT: 43.8 FL (ref 35.9–50)
EST. AVERAGE GLUCOSE BLD GHB EST-MCNC: 146 MG/DL
GFR SERPLBLD CREATININE-BSD FMLA CKD-EPI: 59 ML/MIN/1.73 M 2
GLOBULIN SER CALC-MCNC: 2.5 G/DL (ref 1.9–3.5)
GLUCOSE SERPL-MCNC: 118 MG/DL (ref 65–99)
HBA1C MFR BLD: 6.7 % (ref 4–5.6)
HCT VFR BLD AUTO: 42.8 % (ref 37–47)
HDLC SERPL-MCNC: 48 MG/DL
HGB BLD-MCNC: 14 G/DL (ref 12–16)
IMM GRANULOCYTES # BLD AUTO: 0 K/UL (ref 0–0.11)
IMM GRANULOCYTES NFR BLD AUTO: 0 % (ref 0–0.9)
LDLC SERPL CALC-MCNC: 82 MG/DL
LYMPHOCYTES # BLD AUTO: 1.76 K/UL (ref 1–4.8)
LYMPHOCYTES NFR BLD: 42.3 % (ref 22–41)
MCH RBC QN AUTO: 32 PG (ref 27–33)
MCHC RBC AUTO-ENTMCNC: 32.7 G/DL (ref 32.2–35.5)
MCV RBC AUTO: 97.7 FL (ref 81.4–97.8)
MONOCYTES # BLD AUTO: 0.33 K/UL (ref 0–0.85)
MONOCYTES NFR BLD AUTO: 7.9 % (ref 0–13.4)
NEUTROPHILS # BLD AUTO: 1.91 K/UL (ref 1.82–7.42)
NEUTROPHILS NFR BLD: 46 % (ref 44–72)
NRBC # BLD AUTO: 0 K/UL
NRBC BLD-RTO: 0 /100 WBC (ref 0–0.2)
PLATELET # BLD AUTO: 219 K/UL (ref 164–446)
PMV BLD AUTO: 11.1 FL (ref 9–12.9)
POTASSIUM SERPL-SCNC: 3.9 MMOL/L (ref 3.6–5.5)
PROT SERPL-MCNC: 7 G/DL (ref 6–8.2)
RBC # BLD AUTO: 4.38 M/UL (ref 4.2–5.4)
SODIUM SERPL-SCNC: 141 MMOL/L (ref 135–145)
TRIGL SERPL-MCNC: 130 MG/DL (ref 0–149)
TSH SERPL-ACNC: 2.14 UIU/ML (ref 0.38–5.33)
WBC # BLD AUTO: 4.2 K/UL (ref 4.8–10.8)

## 2025-08-22 PROCEDURE — 82306 VITAMIN D 25 HYDROXY: CPT

## 2025-08-22 PROCEDURE — 84443 ASSAY THYROID STIM HORMONE: CPT

## 2025-08-22 PROCEDURE — 36415 COLL VENOUS BLD VENIPUNCTURE: CPT

## 2025-08-22 PROCEDURE — 85025 COMPLETE CBC W/AUTO DIFF WBC: CPT

## 2025-08-22 PROCEDURE — 83036 HEMOGLOBIN GLYCOSYLATED A1C: CPT

## 2025-08-22 PROCEDURE — 80053 COMPREHEN METABOLIC PANEL: CPT

## 2025-08-22 PROCEDURE — 80061 LIPID PANEL: CPT

## 2025-08-25 PROBLEM — N18.31 CHRONIC KIDNEY DISEASE, STAGE 3A: Status: ACTIVE | Noted: 2025-08-25

## 2025-08-26 SDOH — ECONOMIC STABILITY: FOOD INSECURITY: WITHIN THE PAST 12 MONTHS, YOU WORRIED THAT YOUR FOOD WOULD RUN OUT BEFORE YOU GOT MONEY TO BUY MORE.: NEVER TRUE

## 2025-08-26 SDOH — HEALTH STABILITY: PHYSICAL HEALTH: ON AVERAGE, HOW MANY MINUTES DO YOU ENGAGE IN EXERCISE AT THIS LEVEL?: 20 MIN

## 2025-08-26 SDOH — ECONOMIC STABILITY: INCOME INSECURITY: IN THE LAST 12 MONTHS, WAS THERE A TIME WHEN YOU WERE NOT ABLE TO PAY THE MORTGAGE OR RENT ON TIME?: NO

## 2025-08-26 SDOH — ECONOMIC STABILITY: FOOD INSECURITY: WITHIN THE PAST 12 MONTHS, THE FOOD YOU BOUGHT JUST DIDN'T LAST AND YOU DIDN'T HAVE MONEY TO GET MORE.: NEVER TRUE

## 2025-08-26 SDOH — HEALTH STABILITY: PHYSICAL HEALTH: ON AVERAGE, HOW MANY DAYS PER WEEK DO YOU ENGAGE IN MODERATE TO STRENUOUS EXERCISE (LIKE A BRISK WALK)?: 7 DAYS

## 2025-08-26 SDOH — ECONOMIC STABILITY: INCOME INSECURITY: HOW HARD IS IT FOR YOU TO PAY FOR THE VERY BASICS LIKE FOOD, HOUSING, MEDICAL CARE, AND HEATING?: NOT HARD AT ALL

## 2025-08-26 SDOH — ECONOMIC STABILITY: TRANSPORTATION INSECURITY
IN THE PAST 12 MONTHS, HAS THE LACK OF TRANSPORTATION KEPT YOU FROM MEDICAL APPOINTMENTS OR FROM GETTING MEDICATIONS?: YES

## 2025-08-26 SDOH — ECONOMIC STABILITY: TRANSPORTATION INSECURITY
IN THE PAST 12 MONTHS, HAS LACK OF RELIABLE TRANSPORTATION KEPT YOU FROM MEDICAL APPOINTMENTS, MEETINGS, WORK OR FROM GETTING THINGS NEEDED FOR DAILY LIVING?: YES

## 2025-08-26 SDOH — ECONOMIC STABILITY: TRANSPORTATION INSECURITY
IN THE PAST 12 MONTHS, HAS LACK OF TRANSPORTATION KEPT YOU FROM MEETINGS, WORK, OR FROM GETTING THINGS NEEDED FOR DAILY LIVING?: YES

## 2025-08-26 ASSESSMENT — LIFESTYLE VARIABLES
SKIP TO QUESTIONS 9-10: 1
HOW OFTEN DO YOU HAVE A DRINK CONTAINING ALCOHOL: NEVER
HOW OFTEN DO YOU HAVE SIX OR MORE DRINKS ON ONE OCCASION: NEVER
HOW MANY STANDARD DRINKS CONTAINING ALCOHOL DO YOU HAVE ON A TYPICAL DAY: PATIENT DOES NOT DRINK
AUDIT-C TOTAL SCORE: 0

## 2025-08-26 ASSESSMENT — SOCIAL DETERMINANTS OF HEALTH (SDOH)
HOW OFTEN DO YOU HAVE A DRINK CONTAINING ALCOHOL: NEVER
IN A TYPICAL WEEK, HOW MANY TIMES DO YOU TALK ON THE PHONE WITH FAMILY, FRIENDS, OR NEIGHBORS?: MORE THAN THREE TIMES A WEEK
WITHIN THE PAST 12 MONTHS, YOU WORRIED THAT YOUR FOOD WOULD RUN OUT BEFORE YOU GOT THE MONEY TO BUY MORE: NEVER TRUE
HOW MANY DRINKS CONTAINING ALCOHOL DO YOU HAVE ON A TYPICAL DAY WHEN YOU ARE DRINKING: PATIENT DOES NOT DRINK
HOW OFTEN DO YOU GET TOGETHER WITH FRIENDS OR RELATIVES?: MORE THAN THREE TIMES A WEEK
DO YOU BELONG TO ANY CLUBS OR ORGANIZATIONS SUCH AS CHURCH GROUPS UNIONS, FRATERNAL OR ATHLETIC GROUPS, OR SCHOOL GROUPS?: NO
DO YOU BELONG TO ANY CLUBS OR ORGANIZATIONS SUCH AS CHURCH GROUPS UNIONS, FRATERNAL OR ATHLETIC GROUPS, OR SCHOOL GROUPS?: NO
HOW OFTEN DO YOU ATTENT MEETINGS OF THE CLUB OR ORGANIZATION YOU BELONG TO?: NEVER
IN THE PAST 12 MONTHS, HAS THE ELECTRIC, GAS, OIL, OR WATER COMPANY THREATENED TO SHUT OFF SERVICE IN YOUR HOME?: NO
HOW HARD IS IT FOR YOU TO PAY FOR THE VERY BASICS LIKE FOOD, HOUSING, MEDICAL CARE, AND HEATING?: NOT HARD AT ALL
HOW OFTEN DO YOU GET TOGETHER WITH FRIENDS OR RELATIVES?: MORE THAN THREE TIMES A WEEK
HOW OFTEN DO YOU ATTEND CHURCH OR RELIGIOUS SERVICES?: NEVER
HOW OFTEN DO YOU HAVE SIX OR MORE DRINKS ON ONE OCCASION: NEVER
IN A TYPICAL WEEK, HOW MANY TIMES DO YOU TALK ON THE PHONE WITH FAMILY, FRIENDS, OR NEIGHBORS?: MORE THAN THREE TIMES A WEEK
HOW OFTEN DO YOU ATTENT MEETINGS OF THE CLUB OR ORGANIZATION YOU BELONG TO?: NEVER
HOW OFTEN DO YOU ATTEND CHURCH OR RELIGIOUS SERVICES?: NEVER

## 2025-08-29 ENCOUNTER — OFFICE VISIT (OUTPATIENT)
Dept: MEDICAL GROUP | Age: 80
End: 2025-08-29
Payer: MEDICARE

## 2025-08-29 VITALS
DIASTOLIC BLOOD PRESSURE: 68 MMHG | TEMPERATURE: 98.1 F | HEIGHT: 65 IN | RESPIRATION RATE: 20 BRPM | BODY MASS INDEX: 21.83 KG/M2 | SYSTOLIC BLOOD PRESSURE: 116 MMHG | HEART RATE: 66 BPM | WEIGHT: 131 LBS | OXYGEN SATURATION: 94 %

## 2025-08-29 DIAGNOSIS — E55.9 VITAMIN D DEFICIENCY DISEASE: ICD-10-CM

## 2025-08-29 DIAGNOSIS — E11.22 TYPE 2 DIABETES MELLITUS WITH CHRONIC KIDNEY DISEASE, WITHOUT LONG-TERM CURRENT USE OF INSULIN, UNSPECIFIED CKD STAGE (HCC): ICD-10-CM

## 2025-08-29 DIAGNOSIS — E11.65 UNCONTROLLED TYPE 2 DIABETES MELLITUS WITH HYPERGLYCEMIA (HCC): ICD-10-CM

## 2025-08-29 DIAGNOSIS — N18.31 CHRONIC KIDNEY DISEASE, STAGE 3A: ICD-10-CM

## 2025-08-29 DIAGNOSIS — E78.2 MIXED HYPERLIPIDEMIA: ICD-10-CM

## 2025-08-29 DIAGNOSIS — Z00.00 MEDICARE ANNUAL WELLNESS VISIT, SUBSEQUENT: Primary | ICD-10-CM

## 2025-08-29 DIAGNOSIS — K21.9 GASTROESOPHAGEAL REFLUX DISEASE, UNSPECIFIED WHETHER ESOPHAGITIS PRESENT: ICD-10-CM

## 2025-08-29 DIAGNOSIS — E11.65 TYPE 2 DIABETES MELLITUS WITH HYPERGLYCEMIA, WITHOUT LONG-TERM CURRENT USE OF INSULIN (HCC): ICD-10-CM

## 2025-08-29 RX ORDER — OMEPRAZOLE 40 MG/1
40 CAPSULE, DELAYED RELEASE ORAL DAILY
Qty: 100 CAPSULE | Refills: 2 | Status: SHIPPED | OUTPATIENT
Start: 2025-08-29

## 2025-08-29 RX ORDER — METFORMIN HYDROCHLORIDE 500 MG/1
500 TABLET, EXTENDED RELEASE ORAL DAILY
Qty: 100 TABLET | Refills: 3 | Status: SHIPPED | OUTPATIENT
Start: 2025-08-29 | End: 2026-10-03

## 2025-08-29 RX ORDER — FENOFIBRATE 134 MG/1
1 CAPSULE ORAL
Qty: 100 CAPSULE | Refills: 2 | Status: SHIPPED | OUTPATIENT
Start: 2025-08-29

## 2025-08-29 ASSESSMENT — ENCOUNTER SYMPTOMS: GENERAL WELL-BEING: EXCELLENT

## 2025-08-29 ASSESSMENT — ACTIVITIES OF DAILY LIVING (ADL): BATHING_REQUIRES_ASSISTANCE: 0

## 2025-08-29 ASSESSMENT — FIBROSIS 4 INDEX: FIB4 SCORE: 1.92

## 2025-08-29 ASSESSMENT — PATIENT HEALTH QUESTIONNAIRE - PHQ9: CLINICAL INTERPRETATION OF PHQ2 SCORE: 0
